# Patient Record
Sex: MALE | Race: WHITE | NOT HISPANIC OR LATINO | Employment: OTHER | URBAN - METROPOLITAN AREA
[De-identification: names, ages, dates, MRNs, and addresses within clinical notes are randomized per-mention and may not be internally consistent; named-entity substitution may affect disease eponyms.]

---

## 2018-06-19 ENCOUNTER — APPOINTMENT (OUTPATIENT)
Dept: CT IMAGING | Facility: CLINIC | Age: 48
End: 2018-06-19
Attending: EMERGENCY MEDICINE

## 2018-06-19 ENCOUNTER — APPOINTMENT (OUTPATIENT)
Dept: GENERAL RADIOLOGY | Facility: CLINIC | Age: 48
End: 2018-06-19
Attending: EMERGENCY MEDICINE

## 2018-06-19 ENCOUNTER — HOSPITAL ENCOUNTER (OUTPATIENT)
Facility: CLINIC | Age: 48
Setting detail: OBSERVATION
Discharge: HOME | End: 2018-06-20
Attending: EMERGENCY MEDICINE | Admitting: HOSPITALIST

## 2018-06-19 DIAGNOSIS — R07.9 ACUTE CHEST PAIN: ICD-10-CM

## 2018-06-19 DIAGNOSIS — J02.0 ACUTE STREPTOCOCCAL PHARYNGITIS: Primary | ICD-10-CM

## 2018-06-19 LAB
ANION GAP SERPL CALCULATED.3IONS-SCNC: 9 MMOL/L (ref 3–14)
BASOPHILS # BLD AUTO: 0.1 10E9/L (ref 0–0.2)
BASOPHILS NFR BLD AUTO: 0.7 %
BUN SERPL-MCNC: 12 MG/DL (ref 7–30)
CALCIUM SERPL-MCNC: 8.4 MG/DL (ref 8.5–10.1)
CHLORIDE SERPL-SCNC: 108 MMOL/L (ref 94–109)
CO2 SERPL-SCNC: 20 MMOL/L (ref 20–32)
CREAT SERPL-MCNC: 0.87 MG/DL (ref 0.66–1.25)
D DIMER PPP FEU-MCNC: <0.3 UG/ML FEU (ref 0–0.5)
DIFFERENTIAL METHOD BLD: ABNORMAL
EOSINOPHIL # BLD AUTO: 0.2 10E9/L (ref 0–0.7)
EOSINOPHIL NFR BLD AUTO: 1.5 %
ERYTHROCYTE [DISTWIDTH] IN BLOOD BY AUTOMATED COUNT: 12.8 % (ref 10–15)
GFR SERPL CREATININE-BSD FRML MDRD: >90 ML/MIN/1.7M2
GLUCOSE SERPL-MCNC: 84 MG/DL (ref 70–99)
HCT VFR BLD AUTO: 42.9 % (ref 40–53)
HGB BLD-MCNC: 14.7 G/DL (ref 13.3–17.7)
IMM GRANULOCYTES # BLD: 0.1 10E9/L (ref 0–0.4)
IMM GRANULOCYTES NFR BLD: 0.6 %
LYMPHOCYTES # BLD AUTO: 2.5 10E9/L (ref 0.8–5.3)
LYMPHOCYTES NFR BLD AUTO: 20.6 %
MCH RBC QN AUTO: 29.8 PG (ref 26.5–33)
MCHC RBC AUTO-ENTMCNC: 34.3 G/DL (ref 31.5–36.5)
MCV RBC AUTO: 87 FL (ref 78–100)
MONOCYTES # BLD AUTO: 0.8 10E9/L (ref 0–1.3)
MONOCYTES NFR BLD AUTO: 7 %
NEUTROPHILS # BLD AUTO: 8.3 10E9/L (ref 1.6–8.3)
NEUTROPHILS NFR BLD AUTO: 69.6 %
NRBC # BLD AUTO: 0 10*3/UL
NRBC BLD AUTO-RTO: 0 /100
PLATELET # BLD AUTO: 348 10E9/L (ref 150–450)
POTASSIUM SERPL-SCNC: 3.7 MMOL/L (ref 3.4–5.3)
RBC # BLD AUTO: 4.93 10E12/L (ref 4.4–5.9)
SODIUM SERPL-SCNC: 137 MMOL/L (ref 133–144)
TROPONIN I SERPL-MCNC: <0.015 UG/L (ref 0–0.04)
TROPONIN I SERPL-MCNC: <0.015 UG/L (ref 0–0.04)
WBC # BLD AUTO: 12 10E9/L (ref 4–11)

## 2018-06-19 PROCEDURE — 85025 COMPLETE CBC W/AUTO DIFF WBC: CPT | Performed by: EMERGENCY MEDICINE

## 2018-06-19 PROCEDURE — 85379 FIBRIN DEGRADATION QUANT: CPT | Performed by: EMERGENCY MEDICINE

## 2018-06-19 PROCEDURE — 99285 EMERGENCY DEPT VISIT HI MDM: CPT | Mod: 25

## 2018-06-19 PROCEDURE — 74177 CT ABD & PELVIS W/CONTRAST: CPT

## 2018-06-19 PROCEDURE — 96374 THER/PROPH/DIAG INJ IV PUSH: CPT | Mod: 59

## 2018-06-19 PROCEDURE — 71046 X-RAY EXAM CHEST 2 VIEWS: CPT

## 2018-06-19 PROCEDURE — 96361 HYDRATE IV INFUSION ADD-ON: CPT

## 2018-06-19 PROCEDURE — 96376 TX/PRO/DX INJ SAME DRUG ADON: CPT

## 2018-06-19 PROCEDURE — 99220 ZZC INITIAL OBSERVATION CARE,LEVL III: CPT | Performed by: PHYSICIAN ASSISTANT

## 2018-06-19 PROCEDURE — 93005 ELECTROCARDIOGRAM TRACING: CPT | Mod: 76

## 2018-06-19 PROCEDURE — 93005 ELECTROCARDIOGRAM TRACING: CPT

## 2018-06-19 PROCEDURE — 80048 BASIC METABOLIC PNL TOTAL CA: CPT | Performed by: EMERGENCY MEDICINE

## 2018-06-19 PROCEDURE — 84484 ASSAY OF TROPONIN QUANT: CPT | Performed by: EMERGENCY MEDICINE

## 2018-06-19 PROCEDURE — G0378 HOSPITAL OBSERVATION PER HR: HCPCS

## 2018-06-19 PROCEDURE — 25000128 H RX IP 250 OP 636: Performed by: EMERGENCY MEDICINE

## 2018-06-19 RX ORDER — OXYCODONE HYDROCHLORIDE 5 MG/1
5-10 TABLET ORAL
Status: DISCONTINUED | OUTPATIENT
Start: 2018-06-19 | End: 2018-06-20 | Stop reason: HOSPADM

## 2018-06-19 RX ORDER — NITROGLYCERIN 0.4 MG/1
TABLET SUBLINGUAL
Status: DISCONTINUED
Start: 2018-06-19 | End: 2018-06-19 | Stop reason: WASHOUT

## 2018-06-19 RX ORDER — LORAZEPAM 2 MG/ML
0.5 INJECTION INTRAMUSCULAR ONCE
Status: COMPLETED | OUTPATIENT
Start: 2018-06-19 | End: 2018-06-19

## 2018-06-19 RX ORDER — IOPAMIDOL 755 MG/ML
500 INJECTION, SOLUTION INTRAVASCULAR ONCE
Status: DISCONTINUED | OUTPATIENT
Start: 2018-06-19 | End: 2018-06-19

## 2018-06-19 RX ORDER — ASPIRIN 81 MG/1
81 TABLET ORAL DAILY
Status: DISCONTINUED | OUTPATIENT
Start: 2018-06-20 | End: 2018-06-20 | Stop reason: HOSPADM

## 2018-06-19 RX ORDER — LIDOCAINE 40 MG/G
CREAM TOPICAL
Status: DISCONTINUED | OUTPATIENT
Start: 2018-06-19 | End: 2018-06-20 | Stop reason: HOSPADM

## 2018-06-19 RX ORDER — ACETAMINOPHEN 325 MG/1
650 TABLET ORAL EVERY 4 HOURS PRN
Status: DISCONTINUED | OUTPATIENT
Start: 2018-06-19 | End: 2018-06-20 | Stop reason: HOSPADM

## 2018-06-19 RX ORDER — ALUMINA, MAGNESIA, AND SIMETHICONE 2400; 2400; 240 MG/30ML; MG/30ML; MG/30ML
30 SUSPENSION ORAL EVERY 4 HOURS PRN
Status: DISCONTINUED | OUTPATIENT
Start: 2018-06-19 | End: 2018-06-20 | Stop reason: HOSPADM

## 2018-06-19 RX ORDER — IOPAMIDOL 755 MG/ML
500 INJECTION, SOLUTION INTRAVASCULAR ONCE
Status: COMPLETED | OUTPATIENT
Start: 2018-06-19 | End: 2018-06-19

## 2018-06-19 RX ORDER — MORPHINE SULFATE 4 MG/ML
4 INJECTION, SOLUTION INTRAMUSCULAR; INTRAVENOUS
Status: DISCONTINUED | OUTPATIENT
Start: 2018-06-19 | End: 2018-06-20 | Stop reason: HOSPADM

## 2018-06-19 RX ORDER — NALOXONE HYDROCHLORIDE 0.4 MG/ML
.1-.4 INJECTION, SOLUTION INTRAMUSCULAR; INTRAVENOUS; SUBCUTANEOUS
Status: DISCONTINUED | OUTPATIENT
Start: 2018-06-19 | End: 2018-06-20 | Stop reason: HOSPADM

## 2018-06-19 RX ORDER — NITROGLYCERIN 0.4 MG/1
0.4 TABLET SUBLINGUAL EVERY 5 MIN PRN
Status: DISCONTINUED | OUTPATIENT
Start: 2018-06-19 | End: 2018-06-20 | Stop reason: HOSPADM

## 2018-06-19 RX ORDER — SODIUM CHLORIDE 9 MG/ML
1000 INJECTION, SOLUTION INTRAVENOUS CONTINUOUS
Status: DISCONTINUED | OUTPATIENT
Start: 2018-06-19 | End: 2018-06-19

## 2018-06-19 RX ORDER — LORAZEPAM 2 MG/ML
.5-1 INJECTION INTRAMUSCULAR EVERY 4 HOURS PRN
Status: DISCONTINUED | OUTPATIENT
Start: 2018-06-19 | End: 2018-06-20 | Stop reason: HOSPADM

## 2018-06-19 RX ORDER — LORAZEPAM 2 MG/ML
0.5 INJECTION INTRAMUSCULAR ONCE
Status: DISCONTINUED | OUTPATIENT
Start: 2018-06-19 | End: 2018-06-19

## 2018-06-19 RX ORDER — ACETAMINOPHEN 650 MG/1
650 SUPPOSITORY RECTAL EVERY 4 HOURS PRN
Status: DISCONTINUED | OUTPATIENT
Start: 2018-06-19 | End: 2018-06-20 | Stop reason: HOSPADM

## 2018-06-19 RX ADMIN — SODIUM CHLORIDE 1000 ML: 9 INJECTION, SOLUTION INTRAVENOUS at 15:13

## 2018-06-19 RX ADMIN — LORAZEPAM 0.5 MG: 2 INJECTION INTRAMUSCULAR; INTRAVENOUS at 17:52

## 2018-06-19 RX ADMIN — LORAZEPAM 0.5 MG: 2 INJECTION INTRAMUSCULAR; INTRAVENOUS at 17:36

## 2018-06-19 RX ADMIN — IOPAMIDOL 75 ML: 755 INJECTION, SOLUTION INTRAVENOUS at 19:17

## 2018-06-19 RX ADMIN — LORAZEPAM 0.5 MG: 2 INJECTION INTRAMUSCULAR; INTRAVENOUS at 18:35

## 2018-06-19 RX ADMIN — SODIUM CHLORIDE 60 ML: 9 INJECTION, SOLUTION INTRAVENOUS at 19:17

## 2018-06-19 ASSESSMENT — ENCOUNTER SYMPTOMS
UNEXPECTED WEIGHT CHANGE: 1
SHORTNESS OF BREATH: 1
FEVER: 0
MYALGIAS: 1
NECK PAIN: 1
CHEST TIGHTNESS: 1
LIGHT-HEADEDNESS: 1
DIAPHORESIS: 1

## 2018-06-19 NOTE — IP AVS SNAPSHOT
[facility] Observation Department    [address]    [facility] [address]    Phone:  [phone]                                       After Visit Summary   6/19/2018    Christiano Garcia    MRN: [id number]           After Visit Summary Signature Page     I have received my discharge instructions, and my questions have been answered. I have discussed any challenges I see with this plan with the nurse or doctor.    ..........................................................................................................................................  Patient/Patient Representative Signature      ..........................................................................................................................................  Patient Representative Print Name and Relationship to Patient    ..................................................               ................................................  Date                                            Time    ..........................................................................................................................................  Reviewed by Signature/Title    ...................................................              ..............................................  Date                                                            Time

## 2018-06-19 NOTE — ED PROVIDER NOTES
History     Chief Complaint:  Chest Pain    HPI   Christiano Garcia is a 48 year old male with a history of MI and HTN who presents to the emergency department for evaluation of chest pain. The patient reports that he has had chest pain since waking this morning. Accompanying this chest pain, the patient indicates he has light-headedness, neck pain, left arm pain/numbness, diaphoresis, shortness of breath, and ringing in his ears bilaterally. He also had one episode of syncope around 1300. The patient notes he has gained weight the last 3 weeks, along with leg swelling. He denies any recent travel or surgery, and he is not seen regularly.  He has not been taking metoprolol for many years now.  He reports the chest pain does also radiate into his back at times.  His father  at the aortic aneurysm about a year and half ago.  He does not drink alcohol anymore.     Allergies:  NKDA     Medications:    Albuterol  Multivitamin     Past Medical History:    Acute intermittent porphyria  CAD  Esophagitis  Kidney stones  PUD    Past Surgical History:    EGD Combined x2  Laparotomy    Family History:    No past pertinent family history.    Social History:  Presents alone.  Current some day smoker, 0.5 ppd.  Negative for alcohol use.  Marital Status:  Single [1]    Review of Systems   Constitutional: Positive for diaphoresis and unexpected weight change. Negative for fever.   HENT:        Ringing in the ears   Respiratory: Positive for chest tightness and shortness of breath.    Cardiovascular: Positive for chest pain and leg swelling.   Musculoskeletal: Positive for myalgias (left arm) and neck pain.   Neurological: Positive for light-headedness.   All other systems reviewed and are negative.    Physical Exam     Patient Vitals for the past 24 hrs:   BP Temp Pulse Heart Rate Resp SpO2 Weight   18 1900 (!) 130/94 - - 92 - 99 % -   18 1845 (!) 136/118 - - 92 - - -   18 1830 (!) 126/96 - - 82 - - -   18  1815 (!) 111/100 - - 116 - - -   06/19/18 1800 122/83 - - 86 - 94 % -   06/19/18 1715 (!) 134/102 - - 91 - - -   06/19/18 1700 (!) 137/103 - - 81 - - -   06/19/18 1645 (!) 132/109 - - 89 - - -   06/19/18 1615 (!) 116/107 - - 75 - - -   06/19/18 1600 118/80 - - 85 - - -   06/19/18 1545 (!) 124/98 - - 78 - - -   06/19/18 1530 127/86 - - 80 - - -   06/19/18 1515 131/87 - - 89 - - -   06/19/18 1500 (!) 132/93 - - 92 - - -   06/19/18 1445 (!) 134/92 - - 99 - 97 % -   06/19/18 1430 (!) 134/96 - - 99 - 96 % -   06/19/18 1415 (!) 138/95 - - 109 - 97 % -   06/19/18 1403 (!) 125/102 98  F (36.7  C) 125 - 20 100 % 99.8 kg (220 lb)     Physical Exam  General: At times agitated in room, yelling in pain   Head:  The scalp, face, and head appear normal  Eyes:  Conjunctivae are normal  ENT:    The nose is normal    Pinnae are normal    External acoustic canals are normal  Neck:  Trachea midline, good ROM   CV:  Pulses are normal, RRR, no murmurs.    Resp:  No respiratory distress, CTAB   Abdomen:      Soft, non-tender, non-distended  Musc:  Normal muscular tone    No major joint effusions    No asymmetric leg swelling  Skin:  No rash or lesions noted  Neuro:  Speech is normal and fluent. Face is symmetric.     Moving all extremities well.   Psych: Awake. Alert.  Normal affect.  Appropriate interactions.    Emergency Department Course   ECG:  Time: 1354  Vent. Rate 111 bpm. RI interval 152. QRS duration 82. QT/QTc 326/443. P-R-T axis 64 -23 83. Sinus tachycardia. Otherwise normal ECG. Read time: 1354    Time: 1716  Vent. Rate 91 bpm. RI interval 168. QRS duration 88. QT/QTc 364/447. P-R-T axis 64 -29 70. Normal sinus rhythm. Nonspecific T wave abnormality. Abnormal ECG. Read time: 1718    Imaging:  Radiographic findings were communicated with the patient who voiced understanding of the findings.    XR Chest 2 views:   No acute disease. As per radiology.     CT Aortic Survery w Contrast:  1. No acute abnormality in the chest,  abdomen, or pelvis. No evidence  for aneurysm or dissection.   2. A few small indeterminate pulmonary nodules in both lungs, with the  largest measuring 0.6 cm. Please refer to pulmonary nodule follow-up  guidelines below. As per radiology.    Laboratory:  CBC: WBC: 12.0 (H), HGB: 14.7, PLT: 348  BMP: Calcium 8.4 (L), o/w WNL (Creatinine: 0.87)    D-dimer: <0.3    1427 Troponin I: <0.015  1706 Troponin I: <0.015    Interventions:  1513 NS 1L IV BOLUS  1736 Ativan 0.5 mg IV  1752 Ativan 0.5 mg IV  1835 Ativan 0.5 mg IV  1917 NS 1L IV    Emergency Department Course:  Nursing notes and vitals reviewed. 1449 I performed an exam of the patient as documented above.     EKG x2 obtained in the ED, see results above.     IV inserted. Medicine administered as documented above. Blood drawn. This was sent to the lab for further testing, results above.    The patient was sent for a XR Chest, CT Aortic Survey while in the emergency department, findings above.     2020 I rechecked the patient and discussed the results of his workup thus far.     2047 I spoke with DULCE Corcoran for Dr. Mcintyre, hospitalist, who agreed to admit this patient.    Findings and plan explained to the Patient who consents to admission. Discussed the patient with DULCE Corcoran, who will admit the patient to an observation bed for further monitoring, evaluation, and treatment.    I personally reviewed the laboratory results with the Patient and answered all related questions prior to admittance.    Impression & Plan      Medical Decision Making:  Christiano Garcia is a 48 year old male with a history of possible porphyria, MI, who presents with chest pain and syncope. Vitals here revealed intermittent tachycardia, but otherwise unremarkable. He was very anxious on arrival. He was screaming in pain, reporting numbness in his left arm. It was intermittent in nature. Differential for him is quite wide, EKG was nonischemic. I am certainly concerned about aortic  pathologies. His father had an AAA 1.5 years ago and was the cause of his death. He does say his chest pain radiates to the back, so we opted to do a CT Aortic Survey. However, when we brought him to CT, he had an extreme panic attack and could not tolerate it. He was very difficult to talk to during this panic attack. On return, we gave him a total of 1.5 mg of Ativan, which seemed to help both his chest pain and his anxiety. He was able to tolerate a CT. CT Aortic Survey was negative. I did do two troponins which were negative. His CBC and BMP were otherwise unremarkable. Patient looks much more comfortable than when he arrived, especially regarding chest pain. However, he states he still has chest pain. Given his degree of pain and his presentation, I think it would be reasonable to bring him to obs. He did have an MI 10 years in [place]. He did have a stress test at that time, so he does have risk factors. Patient will be admitted to obs unit for continued workup of his chest pain and treatment.    Diagnosis:    ICD-10-CM   1. Acute chest pain R07.9       Disposition:  Admitted to DULCE Corcoran for Dr. Mcintyre.    I, Diomedes Padron, am serving as a scribe on 6/19/2018 at 2:49 PM to personally document services performed by Yumiko Grayson MD based on my observations and the provider's statements to me.     Diomedes Padron  6/19/2018   [facility] EMERGENCY DEPARTMENT       Yumiko Grayson MD  06/19/18 2147

## 2018-06-19 NOTE — ED NOTES
Patient complaining of severe chest pain.  MD notified and came to bedside.  Patient also reports that he has coughed up blood.  Patient is holding kleenex that is lightly pink in color.

## 2018-06-19 NOTE — ED TRIAGE NOTES
Chest pain and left arm numbness x 2 hours. Patient states history of MI 10 years ago. Woke up with chest pain this morning and sweating. ABC intact alert and no distress, ears ringing. + syncope at home

## 2018-06-19 NOTE — IP AVS SNAPSHOT
MRN:[id number]                      After Visit Summary   6/19/2018    Christiano Garcia    MRN: [id number]           Thank you!     Thank you for choosing [facility] for your care. Our goal is always to provide you with excellent care. Hearing back from our patients is one way we can continue to improve our services. Please take a few minutes to complete the written survey that you may receive in the mail after you visit. If you would like to speak to someone directly about your visit please contact Patient Relations at [phone]. Thank you!          Patient Information     Date Of Birth          1970        Designated Caregiver       Most Recent Value    Caregiver    Will someone help with your care after discharge? yes    Name of designated caregiver Afua Christensen    Phone number of caregiver       About your hospital stay     You were admitted on:  June 19, 2018 You last received care in the:  [facility] Observation Department    You were discharged on:  June 20, 2018        Reason for your hospital stay       Lymphadenopathy likely related to Strep pharyngitis                  Who to Call     For medical emergencies, please call 911.  For non-urgent questions about your medical care, please call your primary care provider or clinic, None          Attending Provider     Provider Specialty    Yumiko Grayson MD Emergency Medicine    Bo Mcintyre MD Internal Medicine       Primary Care Provider Fax #    Physician No Ref-Primary [phone]      After Care Instructions     Activity       Your activity upon discharge: activity as tolerated            Diet       Follow this diet upon discharge: Orders Placed This Encounter      Regular Diet Adult                  Follow-up Appointments     Follow-up and recommended labs and tests        Follow up with [facility], [place], ([address], [place] [address]) on Tuesday June 26 at 9:30 (9:15  "check-in) with Dr. Dooley for post-hospital visit. Follow-up on , with Dr. Юлия Grubbs to establish care (also [facility], [place])                             Pending Results     No orders found for last 3 day(s).            Statement of Approval     Ordered          18 1256  I have reviewed and agree with all the recommendations and orders detailed in this document.  EFFECTIVE NOW     Approved and electronically signed by:  Bo Mcintyre MD             Admission Information     Date & Time Provider Department Dept. Phone    2018 Bo Mcintyre MD [facility] Observation Department [phone]      Your Vitals Were     Blood Pressure Pulse Temperature Respirations Weight Pulse Oximetry    115/87 (BP Location: Left arm) 79 95.8  F (35.4  C) (Oral) 18 99.8 kg (220 lb) 98%    BMI (Body Mass Index)                   27.5 kg/m2           MyChart Information     [facility] lets you send messages to your doctor, view your test results, renew your prescriptions, schedule appointments and more. To sign up, go to [email or web address] . Click on \"Log in\" on the left side of the screen, which will take you to the Welcome page. Then click on \"Sign up Now\" on the right side of the page.     You will be asked to enter the access code listed below, as well as some personal information. Please follow the directions to create your username and password.     Your access code is: [email or web address]  Expires: 2018 12:59 PM     Your access code will  in 90 days. If you need help or a new code, please call your [facility] clinic or [phone].        Care EveryWhere ID     This is your Care EveryWhere ID. This could be used by other organizations to access your [facility] medical records  [id number]        Equal Access to Services     ROSEANNE GUZMÁN : Ashok Mason, waaxda luqadaha, qaybta kaalmada elisabet, alana ford. So wa " [phone].    ATENCIÓN: Si diamante galarza, tiene a gloria disposición servicios gratuitos de asistencia lingüística. Sindhu vieira [phone].    We comply with applicable federal civil rights laws and [place] laws. We do not discriminate on the basis of race, color, national origin, age, disability, sex, sexual orientation, or gender identity.               Review of your medicines      START taking        Dose / Directions    amoxicillin 500 MG capsule   Commonly known as:  AMOXIL   Indication:  strep pharyngitis   Used for:  Acute streptococcal pharyngitis        Dose:  500 mg   Take 1 capsule (500 mg) by mouth every 12 hours for 19 doses   Quantity:  19 capsule   Refills:  0         CONTINUE these medicines which have NOT CHANGED        Dose / Directions    albuterol 108 (90 Base) MCG/ACT Inhaler   Commonly known as:  PROAIR HFA        Dose:  2 puff   Inhale 2 puffs into the lungs every 4 hours as needed for shortness of breath / dyspnea   Quantity:  1 Inhaler   Refills:  0       Multi-vitamin Tabs tablet        Dose:  1 tablet   Take 1 tablet by mouth daily   Refills:  0            Where to get your medicines      These medications were sent to [facility] [address] [address] [address]     Phone:  [phone]     amoxicillin 500 MG capsule                Protect others around you: Learn how to safely use, store and throw away your medicines at www.disposemymeds.org.        ANTIBIOTIC INSTRUCTION     You've Been Prescribed an Antibiotic - Now What?  Your healthcare team thinks that you or your loved one might have an infection. Some infections can be treated with antibiotics, which are powerful, life-saving drugs. Like all medications, antibiotics have side effects and should only be used when necessary. There are some important things you should know about your antibiotic treatment.      Your healthcare team may run tests before you start  taking an antibiotic.    Your team may take samples (e.g., from your blood, urine or other areas) to run tests to look for bacteria. These test can be important to determine if you need an antibiotic at all and, if you do, which antibiotic will work best.      Within a few days, your healthcare team might change or even stop your antibiotic.    Your team may start you on an antibiotic while they are working to find out what is making you sick.    Your team might change your antibiotic because test results show that a different antibiotic would be better to treat your infection.    In some cases, once your team has more information, they learn that you do not need an antibiotic at all. They may find out that you don't have an infection, or that the antibiotic you're taking won't work against your infection. For example, an infection caused by a virus can't be treated with antibiotics. Staying on an antibiotic when you don't need it is more likely to be harmful than helpful.      You may experience side effects from your antibiotic.    Like all medications, antibiotics have side effects. Some of these can be serious.    Let you healthcare team know if you have any known allergies when you are admitted to the hospital.    One significant side effect of nearly all antibiotics is the risk of severe and sometimes deadly diarrhea caused by Clostridium difficile (C. Difficile). This occurs when a person takes antibiotics because some good germs are destroyed. Antibiotic use allows C. diificile to take over, putting patients at high risk for this serious infection.    As a patient or caregiver, it is important to understand your or your loved one's antibiotic treatment. It is especially important for caregivers to speak up when patients can't speak for themselves. Here are some important questions to ask your healthcare team.    What infection is this antibiotic treating and how do you know I have that infection?    What  side effects might occur from this antibiotic?    How long will I need to take this antibiotic?    Is it safe to take this antibiotic with other medications or supplements (e.g., vitamins) that I am taking?     Are there any special directions I need to know about taking this antibiotic? For example, should I take it with food?    How will I be monitored to know whether my infection is responding to the antibiotic?    What tests may help to make sure the right antibiotic is prescribed for me?      Information provided by:  www.cdc.gov/getsmart  U.S. Department of Health and Human Services  Centers for disease Control and Prevention  National Center for Emerging and Zoonotic Infectious Diseases  Division of Healthcare Quality Promotion             Medication List: This is a list of all your medications and when to take them. Check marks below indicate your daily home schedule. Keep this list as a reference.      Medications           Morning Afternoon Evening Bedtime As Needed    albuterol 108 (90 Base) MCG/ACT Inhaler   Commonly known as:  PROAIR HFA   Inhale 2 puffs into the lungs every 4 hours as needed for shortness of breath / dyspnea                                amoxicillin 500 MG capsule   Commonly known as:  AMOXIL   Take 1 capsule (500 mg) by mouth every 12 hours for 19 doses   Last time this was given:  500 mg on 6/20/2018 12:16 PM                                Multi-vitamin Tabs tablet   Take 1 tablet by mouth daily

## 2018-06-20 ENCOUNTER — APPOINTMENT (OUTPATIENT)
Dept: CARDIOLOGY | Facility: CLINIC | Age: 48
End: 2018-06-20
Attending: PHYSICIAN ASSISTANT

## 2018-06-20 ENCOUNTER — APPOINTMENT (OUTPATIENT)
Dept: ULTRASOUND IMAGING | Facility: CLINIC | Age: 48
End: 2018-06-20
Attending: PHYSICIAN ASSISTANT

## 2018-06-20 VITALS
WEIGHT: 220 LBS | RESPIRATION RATE: 18 BRPM | DIASTOLIC BLOOD PRESSURE: 91 MMHG | SYSTOLIC BLOOD PRESSURE: 127 MMHG | HEART RATE: 79 BPM | BODY MASS INDEX: 27.5 KG/M2 | TEMPERATURE: 95.9 F | OXYGEN SATURATION: 98 %

## 2018-06-20 LAB
DEPRECATED S PYO AG THROAT QL EIA: ABNORMAL
INTERPRETATION ECG - MUSE: NORMAL
INTERPRETATION ECG - MUSE: NORMAL
SPECIMEN SOURCE: ABNORMAL
TROPONIN I SERPL-MCNC: <0.015 UG/L (ref 0–0.04)

## 2018-06-20 PROCEDURE — 93018 CV STRESS TEST I&R ONLY: CPT | Performed by: INTERNAL MEDICINE

## 2018-06-20 PROCEDURE — 25000128 H RX IP 250 OP 636: Performed by: HOSPITALIST

## 2018-06-20 PROCEDURE — 93306 TTE W/DOPPLER COMPLETE: CPT | Mod: 26 | Performed by: INTERNAL MEDICINE

## 2018-06-20 PROCEDURE — 93306 TTE W/DOPPLER COMPLETE: CPT

## 2018-06-20 PROCEDURE — 36415 COLL VENOUS BLD VENIPUNCTURE: CPT | Performed by: PHYSICIAN ASSISTANT

## 2018-06-20 PROCEDURE — 93325 DOPPLER ECHO COLOR FLOW MAPG: CPT | Mod: 26 | Performed by: INTERNAL MEDICINE

## 2018-06-20 PROCEDURE — 99217 ZZC OBSERVATION CARE DISCHARGE: CPT | Performed by: HOSPITALIST

## 2018-06-20 PROCEDURE — 93350 STRESS TTE ONLY: CPT | Mod: 26 | Performed by: INTERNAL MEDICINE

## 2018-06-20 PROCEDURE — 93321 DOPPLER ECHO F-UP/LMTD STD: CPT | Mod: 26 | Performed by: INTERNAL MEDICINE

## 2018-06-20 PROCEDURE — 93016 CV STRESS TEST SUPVJ ONLY: CPT | Performed by: INTERNAL MEDICINE

## 2018-06-20 PROCEDURE — 87880 STREP A ASSAY W/OPTIC: CPT | Performed by: HOSPITALIST

## 2018-06-20 PROCEDURE — 25000132 ZZH RX MED GY IP 250 OP 250 PS 637: Performed by: HOSPITALIST

## 2018-06-20 PROCEDURE — 96376 TX/PRO/DX INJ SAME DRUG ADON: CPT

## 2018-06-20 PROCEDURE — 93880 EXTRACRANIAL BILAT STUDY: CPT

## 2018-06-20 PROCEDURE — 93321 DOPPLER ECHO F-UP/LMTD STD: CPT | Mod: TC

## 2018-06-20 PROCEDURE — G0378 HOSPITAL OBSERVATION PER HR: HCPCS

## 2018-06-20 PROCEDURE — 84484 ASSAY OF TROPONIN QUANT: CPT | Performed by: PHYSICIAN ASSISTANT

## 2018-06-20 RX ORDER — IOPAMIDOL 755 MG/ML
500 INJECTION, SOLUTION INTRAVASCULAR ONCE
Status: COMPLETED | OUTPATIENT
Start: 2018-06-20 | End: 2018-06-20

## 2018-06-20 RX ORDER — AMOXICILLIN 500 MG/1
500 CAPSULE ORAL EVERY 12 HOURS
Qty: 19 CAPSULE | Refills: 0 | Status: SHIPPED | OUTPATIENT
Start: 2018-06-20 | End: 2018-06-30

## 2018-06-20 RX ORDER — AMOXICILLIN 500 MG/1
500 CAPSULE ORAL EVERY 12 HOURS SCHEDULED
Status: DISCONTINUED | OUTPATIENT
Start: 2018-06-20 | End: 2018-06-20 | Stop reason: HOSPADM

## 2018-06-20 RX ORDER — LORAZEPAM 2 MG/ML
2 INJECTION INTRAMUSCULAR ONCE
Status: COMPLETED | OUTPATIENT
Start: 2018-06-20 | End: 2018-06-20

## 2018-06-20 RX ADMIN — AMOXICILLIN 500 MG: 500 CAPSULE ORAL at 12:16

## 2018-06-20 RX ADMIN — LORAZEPAM 2 MG: 2 INJECTION INTRAMUSCULAR; INTRAVENOUS at 10:53

## 2018-06-20 RX ADMIN — IOPAMIDOL 80 ML: 755 INJECTION, SOLUTION INTRAVENOUS at 11:51

## 2018-06-20 RX ADMIN — LORAZEPAM 2 MG: 2 INJECTION INTRAMUSCULAR; INTRAVENOUS at 11:33

## 2018-06-20 NOTE — ED NOTES
RN to CT at request of CT staff d/t pt anxious about CT. Pt cooperative and on table, CT staff able to obtain imaging.

## 2018-06-20 NOTE — PLAN OF CARE
Problem: Patient Care Overview  Goal: Plan of Care/Patient Progress Review  PRIMARY DIAGNOSIS: CHEST PAIN  OUTPATIENT/OBSERVATION GOALS TO BE MET BEFORE DISCHARGE:  1. Ruled out acute coronary syndrome (negative or stable Troponin):  Yes, trops neg x3  2. Pain Status: Rating pain 2/10  3. Appropriate provocative testing performed: Yes - echo, exercise stress test  - Stress Test Procedure: Exercise stress test completed  - Interpretation of cardiac rhythm per telemetry tech: SR-[id number]     4. Cleared by Consultants (if applicable): No  5. Return to near baseline physical activity: Yes - up ind  Discharge Planner Nurse   Safe discharge environment identified: Yes  Barriers to discharge: No, not once medically clear       Entered by: Dee Alas 06/20/2018 8:21 AM      Nurse to notify provider when observation goals have been met and patient is ready for discharge.

## 2018-06-20 NOTE — PLAN OF CARE
Problem: Patient Care Overview  Goal: Plan of Care/Patient Progress Review  PRIMARY DIAGNOSIS: CHEST PAIN  OUTPATIENT/OBSERVATION GOALS TO BE MET BEFORE DISCHARGE:  1. Ruled out acute coronary syndrome (negative or stable Troponin):  Yes, trops neg x3  2. Pain Status: Rating pain 2/10   3. Appropriate provocative testing performed: Yes - echo, exercise stress test, US carotid  - Stress Test Procedure: Exercise stress test completed  - Interpretation of cardiac rhythm per telemetry tech: SR      4. Cleared by Consultants (if applicable): No  5. Return to near baseline physical activity: Yes - up ind  Discharge Planner Nurse   Safe discharge environment identified: Yes  Barriers to discharge: No, not once medically clear       Entered by: Dee Alas 06/20/2018 11:38 AM     Pt continues to have ringing in ears. Complaints 2/10 pain in left arm.     Rapid strep test completed at +strep.  CT soft tissue neck ordered.      Nurse to notify provider when observation goals have been met and patient is ready for discharge.

## 2018-06-20 NOTE — PLAN OF CARE
Problem: Patient Care Overview  Goal: Plan of Care/Patient Progress Review  PRIMARY DIAGNOSIS: CHEST PAIN  OUTPATIENT/OBSERVATION GOALS TO BE MET BEFORE DISCHARGE:  1. Ruled out acute coronary syndrome (negative or stable Troponin):  Yes, trops neg x3  2. Pain Status: rating chest pain a 7   3. Appropriate provocative testing performed: Yes  - Stress Test Procedure: exercise stress ECHO today  - Interpretation of cardiac rhythm per telemetry tech: SR-[id number]    4. Cleared by Consultants (if applicable):No  5. Return to near baseline physical activity: Yes  Discharge Planner Nurse   Safe discharge environment identified: Yes  Barriers to discharge: Yes       Entered by: Avril Dow 06/20/2018 4:12 AM     VSS, A&Ox4, up independently, still rating chest pain a 7- refusing meds at this time, will have exercise stress ECHO today, will have carotid US this AM also, clear liquid diet, trops neg x3, SL     Please review provider order for any additional goals.   Nurse to notify provider when observation goals have been met and patient is ready for discharge.

## 2018-06-20 NOTE — PHARMACY-ADMISSION MEDICATION HISTORY
Admission medication history interview status for this patient is complete. See [facility] admission navigator for allergy information, prior to admission medications and immunization status.     Medication history interview source(s):Patient  Medication history resources (including written lists, pill bottles, clinic record):None    Changes made to PTA medication list:  Added: none  Deleted: none  Changed: none    Actions taken by pharmacist (provider contacted, etc):None     Additional medication history information:None    Medication reconciliation/reorder completed by provider prior to medication history? No    For patients on insulin therapy: no (Yes/No)   Lantus/levemir/NPH/Mix 70/30 dose: ___ in AM/PM or twice daily   Sliding scale Novolog Y/N   If Yes, do you have a baseline novolog pre-meal dose: ______units with meals   Patients eat three meals a day: Y/N ---  How many episodes of hypoglycemia (low blood glucose) do you have weekly: ---   How many missed doses do you have a week: ---  How many times do you check your blood glucose per day: ---  Any Barriers to therapy: cost of medications/comfortable with giving injections (if applicable)/ comfortable and confident with current diabetes regimen ---      Prior to Admission medications    Medication Sig Last Dose Taking? Auth Provider   albuterol (ALBUTEROL) 108 (90 BASE) MCG/ACT inhaler Inhale 2 puffs into the lungs every 4 hours as needed for shortness of breath / dyspnea  Yes Phani Persaud MD   multivitamin, therapeutic with minerals (MULTI-VITAMIN) TABS Take 1 tablet by mouth daily Past Month at Unknown time Yes Unknown, Entered By History

## 2018-06-20 NOTE — PLAN OF CARE
Problem: Patient Care Overview  Goal: Plan of Care/Patient Progress Review  Outcome: No Change  ROOM # 206 2    Living Situation  Lives independently in the community with his significant other  Facility name: NA  : Afua Christensen,  [phone]    Activity level at baseline: independent  Activity level on admit: independent      Patient registered to observation; given Patient Bill of Rights; given the opportunity to ask questions about observation status and their plan of care.  Patient has been oriented to the observation room, bathroom and call light is in place.    Discussed discharge goals and expectations with patient/family.

## 2018-06-20 NOTE — ED NOTES
[facility]  ED Nurse Handoff Report    Christiano Garcia is a 48 year old male   ED Chief complaint: Chest Pain  . ED Diagnosis:   Final diagnoses:   Acute chest pain     Allergies: No Known Allergies    Code Status: Full Code  Activity level - Baseline/Home:  Independent. Activity Level - Current:   Stand with Assist. Lift room needed: No. Bariatric: No   Needed: No   Isolation: No. Infection: Not Applicable.     Vital Signs:   Vitals:    06/19/18 1815 06/19/18 1830 06/19/18 1845 06/19/18 1900   BP: (!) 111/100 (!) 126/96 (!) 136/118 (!) 130/94   Pulse:       Resp:       Temp:       SpO2:    99%   Weight:           Cardiac Rhythm:  ,      Pain level: 0-10 Pain Scale: 5  Patient confused: No. Patient Falls Risk: Yes.   Elimination Status: Has voided   Patient Report - Initial Complaint: Chest pain dizziness. Focused Assessment:Pt A&Ox4 C/O L sided chest pain that radiates to the L arm. Pt reports night sweats last night and dizziness with ambulation. Pt NSR on monitor   Tests Performed: labs, CT . Abnormal Results:   Labs Ordered and Resulted from Time of ED Arrival Up to the Time of Departure from the ED   CBC WITH PLATELETS DIFFERENTIAL - Abnormal; Notable for the following:        Result Value    WBC 12.0 (*)     All other components within normal limits   BASIC METABOLIC PANEL - Abnormal; Notable for the following:     Calcium 8.4 (*)     All other components within normal limits   TROPONIN I   D DIMER QUANTITATIVE   TROPONIN I      Treatments provided:IVF ativan  Family Comments: n/a  OBS brochure/video discussed/provided to patient:  Yes  ED Medications:   Medications   0.9% sodium chloride BOLUS (0 mLs Intravenous Stopped 6/19/18 1904)     Followed by   sodium chloride 0.9% infusion (not administered)   iopamidol (ISOVUE-370) solution 500 mL (75 mLs Intravenous Not Given 6/19/18 1721)   0.9% sodium chloride BOLUS (0 mLs Intravenous Stopped 6/19/18 1731)   LORazepam (ATIVAN) injection 0.5 mg  (not administered)   LORazepam (ATIVAN) injection 0.5 mg (0.5 mg Intravenous Given 6/19/18 1736)   LORazepam (ATIVAN) injection 0.5 mg (0.5 mg Intravenous Given 6/19/18 1752)   LORazepam (ATIVAN) injection 0.5 mg (0.5 mg Intravenous Given 6/19/18 1835)   0.9% sodium chloride BOLUS (0 mLs Intravenous Stopped 6/19/18 1929)   iopamidol (ISOVUE-370) solution 500 mL (75 mLs Intravenous Given 6/19/18 1917)     Drips infusing:  No  For the majority of the shift, the patient's behavior Yellow. Interventions performed were Pt anxious, ativan provided .     Severe Sepsis OR Septic Shock Diagnosis Present: No      ED Nurse Name/Phone Number: Cjmomo SHAHLA Aldridge,   9:17 PRECEIVING UNIT ED HANDOFF REVIEW    Above ED Nurse Handoff Report was reviewed: Yes  Reviewed by: Karen M. Lesch on June 19, 2018 at 9:28 PM

## 2018-06-20 NOTE — PLAN OF CARE
Problem: Patient Care Overview  Goal: Plan of Care/Patient Progress Review  Patient's After Visit Summary was reviewed with patient.   Patient verbalized understanding of After Visit Summary, recommended follow up and was given an opportunity to ask questions.   Discharge medications sent home with patient/family: E-script sent, pt to  at pharmacy.  Discharged with other: Pt stated someone from [employer] would pick him up.     Pt advised by RN not to drive after receiving ativan for at least 6 hrs. 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 Pt left room to go  prescription and stated he will wait downstairs for his ride.

## 2018-06-20 NOTE — DISCHARGE SUMMARY
[facility]  Discharge Summary  Name: Christiano Garcia    MRN: [id number]  YOB: 1970    Age: 48 year old  Date of Discharge:  6/20/2018  Date of Admission: 6/19/2018  Primary Care Provider: No Ref-Primary, Physician  Discharge Physician:  Bo Mcintyre MD  Discharging Service:  Hospitalist  Date of Service (when I saw the patient): 06/20/18    Discharge Diagnosis:  Lymphadenopathy due to strep pharyngitis     Other Diagnosis:  CAD, PUD and possible hx of acute intermittent porphyria     Discharge Disposition:  Discharged to home     Allergies:  No Known Allergies     Discharge Medications:   Current Discharge Medication List      START taking these medications    Details   amoxicillin (AMOXIL) 500 MG capsule Take 1 capsule (500 mg) by mouth every 12 hours for 19 doses  Qty: 19 capsule, Refills: 0    Associated Diagnoses: Acute streptococcal pharyngitis         CONTINUE these medications which have NOT CHANGED    Details   albuterol (ALBUTEROL) 108 (90 BASE) MCG/ACT inhaler Inhale 2 puffs into the lungs every 4 hours as needed for shortness of breath / dyspnea  Qty: 1 Inhaler, Refills: 0      multivitamin, therapeutic with minerals (MULTI-VITAMIN) TABS Take 1 tablet by mouth daily              Condition on Discharge:  Discharge condition: Stable   Discharge vitals: Blood pressure 115/87, pulse 79, temperature 95.8  F (35.4  C), temperature source Oral, resp. rate 18, weight 99.8 kg (220 lb), SpO2 98 %.   Code status on discharge: Full Code     History of Illness:  See detailed admission note for full details.   48 year old male with a PMH significant for probable CAD, PUD and possible hx of acute intermittent porphyria, who presents with chest pain.  The patient reports not feeling well last night, he noted left-sided chest pain with radiation to his left arm.  Took some aspirin and went to bed.  He said he woke up in the middle of the night drenched in sweat.  Today his chest pain has persisted,  "it is associated with dyspnea on exertion and lightheadedness.  He notes his pain worsened with walking.  He also notes ringing in his ears that is quite severe.  He is also noted swelling in his bilateral ankles and weight gain over the last few weeks.  He notes he is felt more fatigued with exertion recently.  He also states that around 1300 he is still up had a syncopal episode.  He denies any recent travel or surgery.  He reports a history of a heart attack many years ago but stent was placed.  Had an abnormal stress test a few years ago and was recommended to see cardiologist for further workup but never did do that.  He also notes worsening vision over the past 3 months.  He previously did not need glasses and now it wears readers.  He also states that he gets what he calls \"static\" in his vision intermittently.  He was previously on metoprolol but is no longer taking this.  He does smoke but denies alcohol and drug use.  He also notes nausea lymph node below his right ear.  He denies fever, chills, abdominal pain, vomiting, diarrhea or dysuria.      Significant Physical Exam Findings:  Patient is doing fine. He still has ringing in his ears and some left arm pain.  s1s2 rrr, lungs clear. Bilat TM normal. Oropharynx does not show exudate. He does have some submandibular lymphadenopathy. No axillary or inguinal lymphadenopathy    Procedures:  Echo, stress ECHO     Imaging:  Results for orders placed or performed during the hospital encounter of 06/19/18   XR Chest 2 Views    Narrative    CHEST TWO VIEWS 6/19/2018 4:26 PM     HISTORY: Chest pain.    COMPARISON: September 2, 2014     FINDINGS: There are no acute infiltrates. The cardiac silhouette is  not enlarged. Pulmonary vasculature is unremarkable.      Impression    IMPRESSION: No acute disease.    VERNELL MORENO MD   CT Aortic Survey w Contrast    Narrative    CT AORTIC SURVEY WITH CONTRAST   6/19/2018 7:27 PM     HISTORY: Chest pain. Evaluate for " dissection.    TECHNIQUE: 75 mL Isovue-370 IV were administered. After contrast  administration, volumetric helical sections were acquired from the  thoracic inlet to the ischial tuberosities. Coronal images were also  reconstructed. Radiation dose for this scan was reduced using  automated exposure control, adjustment of the mA and/or kV according  to patient size, or iterative reconstruction technique.    COMPARISON: None.    FINDINGS:    Chest: No evidence for thoracic aortic aneurysm or dissection. No  pleural or pericardial effusions. Calcified right hilar lymph nodes  are noted. No enlarged lymph nodes are identified in the chest. There  is a cluster of small calcified granulomas in the right middle lobe  laterally. There are a few small indeterminate pulmonary nodules  bilaterally, with the largest along the right minor fissure measuring  0.6 cm (series 10 image 177).    Abdomen and Pelvis: No evidence for aortoiliac aneurysm or dissection.  Scattered small calcified granulomas in the spleen. The liver,  gallbladder, spleen, adrenal glands, pancreas, and kidneys are  otherwise unremarkable. No bowel obstruction. Mild central prostatic  calcification. No free fluid in the pelvis. No free intraperitoneal  air. The appendix is not visualized, however there is no significant  inflammatory change identified in the right lower quadrant.      Impression    IMPRESSION:   1. No acute abnormality in the chest, abdomen, or pelvis. No evidence  for aneurysm or dissection.   2. A few small indeterminate pulmonary nodules are noted in both  lungs, with the largest measuring 0.6 cm. Please refer to pulmonary  nodule follow-up guidelines below.    Recommendations for an incidental lung nodule = or > 6mm to 8mm:    Low risk patients: Initial follow-up CT at 6-12 months, then  consider CT at 18-24 months if no change.    High risk patients: Initial follow-up CT at 6-12 months, then CT at  18-24 months if no change.    *Low  Risk: Minimal or absent history of smoking or other known risk  factors.  *Nonsolid (ground-glass) or partly solid nodules may require longer  follow-up to exclude indolent adenocarcinoma.  *Recommendations based on Guidelines for the Management of Incidental  Pulmonary Nodules Detected at CT: From the Fleischner Society 2017,  Radiology 2017.        ARTEM HICKS MD   US Carotid Bilateral    Narrative    BILATERAL CAROTID ULTRASOUND   6/20/2018 5:48 AM     HISTORY:  vision changes, ringing ears;     COMPARISON: None.    RIGHT CAROTID FINDINGS:  There is no calcified and noncalcified  atherosclerotic plaque in the carotid bifurcation.   Right ICA PSV:  95  cm/sec.  Right ICA EDV:  46 cm/sec.  Right ICA/CCA PSV Ratio:  0.8    These indicate less than 50% diameter stenosis of the right ICA.    Right Vertebral: Antegrade flow.   Right ECA: Antegrade flow.     LEFT CAROTID FINDINGS:  There is no calcified and noncalcified  atherosclerotic plaque in the carotid bifurcation.   Left ICA PSV:  85  cm/sec.  Left ICA EDV:  42 cm/sec.  Left ICA/CCA PSV Ratio:  0.6    These indicate less than 50% diameter stenosis of the left ICA.    Left Vertebral: Antegrade flow.   Left ECA: Antegrade flow.     Causes of Decreased Accuracy:   None.       Impression    IMPRESSION:    1. Less than 50% diameter stenosis of the right ICA relative to the  distal ICA diameter.  2. Less than 50% diameter stenosis of the left ICA relative to the  distal ICA diameter.    BENITA VENTURA MD        Consultations:  No consultations were requested during this admission.     Significant Lab Results:    Recent Labs  Lab 06/19/18  1427   WBC 12.0*   HGB 14.7   HCT 42.9   MCV 87             Lab Results   Component Value Date     06/19/2018     09/01/2014     12/20/2013    Lab Results   Component Value Date    CHLORIDE 108 06/19/2018    CHLORIDE 109 09/01/2014    CHLORIDE 108 12/20/2013    Lab Results   Component Value Date    BUN 12  06/19/2018    BUN 13 09/01/2014    BUN 12 12/20/2013      Lab Results   Component Value Date    POTASSIUM 3.7 06/19/2018    POTASSIUM 3.5 09/01/2014    POTASSIUM 4.0 12/20/2013    Lab Results   Component Value Date    CO2 20 06/19/2018    CO2 24 09/01/2014    CO2 27 12/20/2013    Lab Results   Component Value Date    CR 0.87 06/19/2018    CR 0.84 09/01/2014    CR 0.90 12/20/2013           Pending Results:    Unresulted Labs Ordered in the Past 30 Days of this Admission     No orders found for last 61 day(s).           Discharge Instructions and Follow-Up:   Discharge diet:   Active Diet Order      Regular Diet Adult      Diet   Discharge activity: Activity as tolerated   Discharge follow-up: Follow up with primary care provider on Tuesday June 26   Outpatient therapy: None    Home Care agency: None    Other instructions: Establish care with PCP on July 3rd at 9am (Dr. Grubbs)      Hospital Course:  Patient was admitted to the Obs Unit. He had extensive testing done including ECHO, stress ECHO, carotid US, CT chest. When I assessed him, his primary complaints were ringing in his ears and lymphadenopathy. He did have sweating and possible fever at home, but no URI symptoms. I did do a rapid strep and this was positive. He will receive 10 days of amoxicllin. I have called PN and scheduled him with a follow-up appt next week as well as an appt to establish care.      Total time spent in face to face contact with the patient and coordinating discharge was:  40 Minutes.    Bo Mcintyre MD  Pager: [phone]

## 2018-06-20 NOTE — H&P
"[facility] Outpatient / Observation Unit  History and Physical Exam     Christiano Garcia MRN# [id number]   YOB: 1970 Age: 48 year old      Date of Admission:  6/19/2018    Primary care provider: No Ref-Primary, Physician          Assessment:   Christiano Garcia is a 48 year old male with a PMH significant for probable CAD, PUD and possible hx of acute intermittent porphyria, who presents with chest pain.   Work up in ED reveals: VSS. Initially tachycardic at 125, now improved. ED reported pt was quite anxious on arrival. BMP is unremarkable. CBC - WBC 12.0 otherwise unremarkable. Troponin <0.015 x2. D-dimer <0.3. CXR clear. CT chest negative for acute process. EKG SR with non specific ST and T wave abnormalities. He received 1L NS bolus x2 and 0.5 mg IV ativan x3.  Patient is being registered to observation for further evaluation and to rule out possible ACS.     1. Acute Chest pain: hx of abnormal stress test in 2013, was instructed to follow up with cardiology for possible CT angiogram but never followed up. Reports MI in [place], had angio but no stents, in 2000, no records available. Notes left sided chest pain that is crushing and radiates to his left arm, worse with exertion. Associated SOB and diaphoresis. Also notes LE edema and weight gain. Tele, one more troponin, echo and stress echo.   2. LE edema, weight gain and reports AMAYA - unclear etiology, trace edema noted in bilateral LE edema. CXR clear. Tele, echocardiogram  3. Syncope - occurred upon standing up, no hx of syncope. Endorses adequate PO hydration. IVFs, echo  4. Tinnitus and vision changes - complains of ringing in ears, new today. Worsening vision over past 3 months. Also notes intermittent \"static\" in his vision. Unclear etiology. BP within normal ranges. CT chest negative for dissection. Recommended MRI, pt unable to tolerate MRI, had versed with previous MRIs. Will defer to morning rounder if this should be pursued as an outpatient. Will get " "carotid US.  5. Enlarged lymph node inferior to R ear - he states it has been present for ~1 yr, tender to palpation. Notes one episode of night sweats last evening. Recommend outpatient follow.            Plan:     1. [place] to Observation  2. Continue telemetry  3. Follow serial troponins   4. Stress testing with Stress Echo and echocardiogram   5. Cont Aspirin EC 81 mg po daily  6. Blood pressure control  7. Morphine and nitroglycerine PRN for pain    8. regular diet, No caffeine if Nuclear testing selected  9. DVT prophylaxis: pt at low risk, encourage ambulation  10. Code Status: full code  11. Dispo: anticipate discharge tomorrow                  Chief Complaint:   Chest Pain         History of Present Illness:   Christaino Garcia is a 48 year old male with a PMH significant for probable CAD, PUD and possible hx of acute intermittent porphyria, who presents with chest pain.  The patient reports not feeling well last night, he noted left-sided chest pain with radiation to his left arm.  Took some aspirin and went to bed.  He said he woke up in the middle of the night drenched in sweat.  Today his chest pain has persisted, it is associated with dyspnea on exertion and lightheadedness.  He notes his pain worsened with walking.  He also notes ringing in his ears that is quite severe.  He is also noted swelling in his bilateral ankles and weight gain over the last few weeks.  He notes he is felt more fatigued with exertion recently.  He also states that around 1300 he is still up had a syncopal episode.  He denies any recent travel or surgery.  He reports a history of a heart attack many years ago but stent was placed.  Had an abnormal stress test a few years ago and was recommended to see cardiologist for further workup but never did do that.  He also notes worsening vision over the past 3 months.  He previously did not need glasses and now it wears readers.  He also states that he gets what he calls \"static\" in his " "vision intermittently.  He was previously on metoprolol but is no longer taking this.  He does smoke but denies alcohol and drug use.  He also notes nausea lymph node below his right ear.  He denies fever, chills, abdominal pain, vomiting, diarrhea or dysuria.             Past Medical History:     Past Medical History:   Diagnosis Date     Acute intermittent porphyria (H)     per patient, doubt raised by GI during 5/2013 admission     CAD (coronary artery disease)     s/p MI     Esophagitis      Kidney stones      PUD (peptic ulcer disease)                Past Surgical History:     Past Surgical History:   Procedure Laterality Date     ESOPHAGOSCOPY, GASTROSCOPY, DUODENOSCOPY (EGD), COMBINED  2/21/2013    Procedure: COMBINED ESOPHAGOSCOPY, GASTROSCOPY, DUODENOSCOPY (EGD), BIOPSY SINGLE OR MULTIPLE;  EGD with cold biopsy for h pylori;  Surgeon: Jen Galo MD;  Location:  GI     ESOPHAGOSCOPY, GASTROSCOPY, DUODENOSCOPY (EGD), COMBINED  3/8/2013    Procedure: COMBINED ESOPHAGOSCOPY, GASTROSCOPY, DUODENOSCOPY (EGD);  Esophagoscopy,Gastroscopy,Duodenoscopy- Room 504;  Surgeon: Jimenez King DO;  Location:  GI     LAPAROTOMY EXPLORATORY  1995               Social History:     Social History     Social History     Marital status: Single     Spouse name: N/A     Number of children: N/A     Years of education: N/A     Occupational History     Not on file.     Social History Main Topics     Smoking status: Current Some Day Smoker     Packs/day: 0.50     Years: 5.00     Smokeless tobacco: Never Used      Comment: patient smokes \"to raise his blood pressure\" but very rarely when he is not hospitalized     Alcohol use No     Drug use: No     Sexual activity: Not on file     Other Topics Concern     Not on file     Social History Narrative    Christiano has lived his life in [place], served in the [employer] and with [employer].                Family History:   Unknown, adopted         Allergies:    No " Known Allergies            Medications:     Prior to Admission medications    Medication Sig Last Dose Taking? Auth Provider   albuterol (ALBUTEROL) 108 (90 BASE) MCG/ACT inhaler Inhale 2 puffs into the lungs every 4 hours as needed for shortness of breath / dyspnea  Yes Phani Persaud MD   multivitamin, therapeutic with minerals (MULTI-VITAMIN) TABS Take 1 tablet by mouth daily Past Month at Unknown time Yes Unknown, Entered By History              Review of Systems:   A Comprehensive greater than 10 system review of systems was carried out.  Pertinent positives and negatives are noted above.  Otherwise negative for contributory information.     Constitutional, neuro, ENT, endocrine, pulmonary, cardiac, gastrointestinal, genitourinary, musculoskeletal, integument and psychiatric systems are negative, except as otherwise noted.           Physical Exam:   Blood pressure (!) 130/94, pulse 125, temperature 98  F (36.7  C), resp. rate 20, weight 99.8 kg (220 lb), SpO2 99 %.    GENERAL:  Comfortable.  PSYCH: pleasant, oriented, No acute distress.  HEENT:  Atraumatic, normocephalic. Normal conjunctiva, normal hearing, and oropharynx is normal.  NECK:  Supple, no neck vein distention  HEART:  Normal S1, S2 with no murmur, no pericardial rub, gallops or S3 or S4.  LUNGS:  Clear to auscultation, normal Respiratory effort. No wheezing, rales or ronchi.  GI:  Soft, normal bowel sounds. Non-tender, non distended.   EXTREMITIES:  Trace bilateral pedal edema, +2 pulses bilateral and equal.  SKIN:  Dry to touch, No rash, wound or ulcerations.  NEUROLOGIC:  CN 2-12 intact, BL 5/5 symmetric upper and lower extremity strength, sensation is intact with no focal deficits.              Data:     EKG demonstrates:  Sinus Rhythm, nonspecific ST-T wave changes.      Recent Labs  Lab 06/19/18  1427   WBC 12.0*   HGB 14.7   HCT 42.9   MCV 87          Recent Labs  Lab 06/19/18  1427      POTASSIUM 3.7   CHLORIDE 108   CO2 20    ANIONGAP 9   GLC 84   BUN 12   CR 0.87   GFRESTIMATED >90   GFRESTBLACK >90   TEJAL 8.4*       Recent Labs  Lab 06/19/18  1427   DD <0.3       Recent Labs  Lab 06/19/18  1706 06/19/18  1427   TROPI <0.015 <0.015       Recent Results (from the past 48 hour(s))   XR Chest 2 Views    Narrative    CHEST TWO VIEWS 6/19/2018 4:26 PM     HISTORY: Chest pain.    COMPARISON: September 2, 2014     FINDINGS: There are no acute infiltrates. The cardiac silhouette is  not enlarged. Pulmonary vasculature is unremarkable.      Impression    IMPRESSION: No acute disease.    VERNELL MORENO MD   CT Aortic Survey w Contrast    Narrative    CT AORTIC SURVEY WITH CONTRAST   6/19/2018 7:27 PM     HISTORY: Chest pain. Evaluate for dissection.    TECHNIQUE: 75 mL Isovue-370 IV were administered. After contrast  administration, volumetric helical sections were acquired from the  thoracic inlet to the ischial tuberosities. Coronal images were also  reconstructed. Radiation dose for this scan was reduced using  automated exposure control, adjustment of the mA and/or kV according  to patient size, or iterative reconstruction technique.    COMPARISON: None.    FINDINGS:    Chest: No evidence for thoracic aortic aneurysm or dissection. No  pleural or pericardial effusions. Calcified right hilar lymph nodes  are noted. No enlarged lymph nodes are identified in the chest. There  is a cluster of small calcified granulomas in the right middle lobe  laterally. There are a few small indeterminate pulmonary nodules  bilaterally, with the largest along the right minor fissure measuring  0.6 cm (series 10 image 177).    Abdomen and Pelvis: No evidence for aortoiliac aneurysm or dissection.  Scattered small calcified granulomas in the spleen. The liver,  gallbladder, spleen, adrenal glands, pancreas, and kidneys are  otherwise unremarkable. No bowel obstruction. Mild central prostatic  calcification. No free fluid in the pelvis. No free  intraperitoneal  air. The appendix is not visualized, however there is no significant  inflammatory change identified in the right lower quadrant.      Impression    IMPRESSION:   1. No acute abnormality in the chest, abdomen, or pelvis. No evidence  for aneurysm or dissection.   2. A few small indeterminate pulmonary nodules in both lungs, with the  largest measuring 0.6 cm. Please refer to pulmonary nodule follow-up  guidelines below.    Recommendations for an incidental lung nodule = or > 6mm to 8mm:    Low risk patients: Initial follow-up CT at 6-12 months, then  consider CT at 18-24 months if no change.    High risk patients: Initial follow-up CT at 6-12 months, then CT at  18-24 months if no change.    *Low Risk: Minimal or absent history of smoking or other known risk  factors.  *Nonsolid (ground-glass) or partly solid nodules may require longer  follow-up to exclude indolent adenocarcinoma.  *Recommendations based on Guidelines for the Management of Incidental  Pulmonary Nodules Detected at CT: From the Fleischner Society 2017,  Radiology 2017.                 Rosey Corcoran PA-C

## 2019-01-17 ENCOUNTER — HOSPITAL ENCOUNTER (EMERGENCY)
Facility: CLINIC | Age: 49
Discharge: HOME | End: 2019-01-17
Admitting: PHYSICIAN ASSISTANT

## 2019-01-17 ENCOUNTER — APPOINTMENT (OUTPATIENT)
Dept: GENERAL RADIOLOGY | Facility: CLINIC | Age: 49
End: 2019-01-17

## 2019-01-17 VITALS
SYSTOLIC BLOOD PRESSURE: 108 MMHG | BODY MASS INDEX: 27.48 KG/M2 | WEIGHT: 221 LBS | OXYGEN SATURATION: 97 % | HEIGHT: 75 IN | HEART RATE: 76 BPM | RESPIRATION RATE: 18 BRPM | DIASTOLIC BLOOD PRESSURE: 86 MMHG | TEMPERATURE: 98.1 F

## 2019-01-17 DIAGNOSIS — J01.90 ACUTE SINUSITIS, RECURRENCE NOT SPECIFIED, UNSPECIFIED LOCATION: Primary | ICD-10-CM

## 2019-01-17 DIAGNOSIS — R05.9 COUGH: ICD-10-CM

## 2019-01-17 DIAGNOSIS — R07.89 ATYPICAL CHEST PAIN: ICD-10-CM

## 2019-01-17 DIAGNOSIS — R04.2 HEMOPTYSIS: ICD-10-CM

## 2019-01-17 LAB
ANION GAP SERPL CALCULATED.3IONS-SCNC: 9 MMOL/L (ref 3–14)
BASOPHILS # BLD AUTO: 0.1 10E9/L (ref 0–0.2)
BASOPHILS NFR BLD AUTO: 0.7 %
BUN SERPL-MCNC: 11 MG/DL (ref 7–30)
CALCIUM SERPL-MCNC: 8.8 MG/DL (ref 8.5–10.1)
CHLORIDE SERPL-SCNC: 108 MMOL/L (ref 94–109)
CO2 SERPL-SCNC: 22 MMOL/L (ref 20–32)
CREAT SERPL-MCNC: 0.86 MG/DL (ref 0.66–1.25)
DIFFERENTIAL METHOD BLD: NORMAL
EOSINOPHIL # BLD AUTO: 0.4 10E9/L (ref 0–0.7)
EOSINOPHIL NFR BLD AUTO: 3.7 %
ERYTHROCYTE [DISTWIDTH] IN BLOOD BY AUTOMATED COUNT: 12.7 % (ref 10–15)
GFR SERPL CREATININE-BSD FRML MDRD: >90 ML/MIN/{1.73_M2}
GLUCOSE SERPL-MCNC: 101 MG/DL (ref 70–99)
HCT VFR BLD AUTO: 42.3 % (ref 40–53)
HGB BLD-MCNC: 14.2 G/DL (ref 13.3–17.7)
IMM GRANULOCYTES # BLD: 0.1 10E9/L (ref 0–0.4)
IMM GRANULOCYTES NFR BLD: 0.5 %
INTERPRETATION ECG - MUSE: NORMAL
LYMPHOCYTES # BLD AUTO: 2.2 10E9/L (ref 0.8–5.3)
LYMPHOCYTES NFR BLD AUTO: 21.4 %
MCH RBC QN AUTO: 29.5 PG (ref 26.5–33)
MCHC RBC AUTO-ENTMCNC: 33.6 G/DL (ref 31.5–36.5)
MCV RBC AUTO: 88 FL (ref 78–100)
MONOCYTES # BLD AUTO: 0.7 10E9/L (ref 0–1.3)
MONOCYTES NFR BLD AUTO: 7.2 %
NEUTROPHILS # BLD AUTO: 6.7 10E9/L (ref 1.6–8.3)
NEUTROPHILS NFR BLD AUTO: 66.5 %
NRBC # BLD AUTO: 0 10*3/UL
NRBC BLD AUTO-RTO: 0 /100
PLATELET # BLD AUTO: 327 10E9/L (ref 150–450)
POTASSIUM SERPL-SCNC: 3.5 MMOL/L (ref 3.4–5.3)
RBC # BLD AUTO: 4.82 10E12/L (ref 4.4–5.9)
SODIUM SERPL-SCNC: 139 MMOL/L (ref 133–144)
TROPONIN I SERPL-MCNC: <0.015 UG/L (ref 0–0.04)
WBC # BLD AUTO: 10.1 10E9/L (ref 4–11)

## 2019-01-17 PROCEDURE — 99285 EMERGENCY DEPT VISIT HI MDM: CPT | Mod: 25

## 2019-01-17 PROCEDURE — 96374 THER/PROPH/DIAG INJ IV PUSH: CPT

## 2019-01-17 PROCEDURE — 25000128 H RX IP 250 OP 636: Performed by: PHYSICIAN ASSISTANT

## 2019-01-17 PROCEDURE — 93005 ELECTROCARDIOGRAM TRACING: CPT

## 2019-01-17 PROCEDURE — 80048 BASIC METABOLIC PNL TOTAL CA: CPT | Performed by: PHYSICIAN ASSISTANT

## 2019-01-17 PROCEDURE — 94640 AIRWAY INHALATION TREATMENT: CPT

## 2019-01-17 PROCEDURE — 96361 HYDRATE IV INFUSION ADD-ON: CPT

## 2019-01-17 PROCEDURE — 84484 ASSAY OF TROPONIN QUANT: CPT | Performed by: PHYSICIAN ASSISTANT

## 2019-01-17 PROCEDURE — 71046 X-RAY EXAM CHEST 2 VIEWS: CPT

## 2019-01-17 PROCEDURE — 25000125 ZZHC RX 250: Performed by: PHYSICIAN ASSISTANT

## 2019-01-17 PROCEDURE — 85025 COMPLETE CBC W/AUTO DIFF WBC: CPT | Performed by: PHYSICIAN ASSISTANT

## 2019-01-17 RX ORDER — IPRATROPIUM BROMIDE AND ALBUTEROL SULFATE 2.5; .5 MG/3ML; MG/3ML
3 SOLUTION RESPIRATORY (INHALATION) ONCE
Status: COMPLETED | OUTPATIENT
Start: 2019-01-17 | End: 2019-01-17

## 2019-01-17 RX ORDER — KETOROLAC TROMETHAMINE 15 MG/ML
15 INJECTION, SOLUTION INTRAMUSCULAR; INTRAVENOUS ONCE
Status: COMPLETED | OUTPATIENT
Start: 2019-01-17 | End: 2019-01-17

## 2019-01-17 RX ORDER — DEXTROMETHORPHAN POLISTIREX 30 MG/5ML
60 SUSPENSION ORAL 2 TIMES DAILY
Qty: 140 ML | Refills: 0 | Status: SHIPPED | OUTPATIENT
Start: 2019-01-17 | End: 2019-01-24

## 2019-01-17 RX ORDER — ALBUTEROL SULFATE 90 UG/1
2 AEROSOL, METERED RESPIRATORY (INHALATION) EVERY 6 HOURS PRN
Qty: 1 INHALER | Refills: 0 | Status: SHIPPED | OUTPATIENT
Start: 2019-01-17 | End: 2019-02-16

## 2019-01-17 RX ADMIN — KETOROLAC TROMETHAMINE 15 MG: 15 INJECTION, SOLUTION INTRAMUSCULAR; INTRAVENOUS at 11:55

## 2019-01-17 RX ADMIN — IPRATROPIUM BROMIDE AND ALBUTEROL SULFATE 3 ML: .5; 3 SOLUTION RESPIRATORY (INHALATION) at 11:56

## 2019-01-17 RX ADMIN — SODIUM CHLORIDE 1000 ML: 9 INJECTION, SOLUTION INTRAVENOUS at 11:56

## 2019-01-17 ASSESSMENT — MIFFLIN-ST. JEOR: SCORE: 1958.08

## 2019-01-17 ASSESSMENT — ENCOUNTER SYMPTOMS
SINUS PAIN: 1
COUGH: 1
LIGHT-HEADEDNESS: 1
HEADACHES: 1

## 2019-01-17 NOTE — ED NOTES
Pt reports improvement in symptoms post neb. Cough lessened. Chest tightness lessened from 8 to a 5/10.

## 2019-01-17 NOTE — DISCHARGE INSTRUCTIONS
Discharge Instructions  Sinus Infection    You have acute sinusitis, or an infection of the sinuses. The sinuses are the hollow areas within the facial bones that are connected to the nasal opening. The most common cause of acute sinusitis is a virus infection associated with the common cold. Bacterial sinusitis occurs much less commonly, usually as a complication of viral sinusitis. Experts say that most sinusitis is caused by a virus within the first 7-10 days of illness. Antibiotics do nothing to help with virus infections, so most people do not need antibiotics for acute sinusitis.     Return to the Emergency Department if:  Your vision changes.  You are confused or have difficulty thinking clearly.  You have swelling around your eye.  You develop a severe headache or neck stiffness.  You have a fever over 101 degrees.  Your symptoms get worse and you are unable to see your primary doctor.    Follow-up with your doctor:   See your primary doctor in one week if not improving.    Treatment:  Pain relief -- Non-prescription pain medications, such as Tylenol  (acetaminophen) or Motrin  or Advil  (ibuprofen) are recommended for pain.  Do not use a medicine that you are allergic to, or if your doctor has told you not to use it.     Nasal irrigation -- Flushing the nose and sinuses with a saline solution several times per day can help to decrease pain caused by congestion.  Nasal decongestants -- Nasal decongestant sprays, including Afrin  (oxymetazoline) and Chandler-Synephrine  (phenylephrine) can be used to temporarily treat congestion. However, these sprays should not be used for more than two to three days due to the risk of rebound congestion (when the nose is congested constantly unless the medication is used repeatedly).  Nasal glucocorticoids -- These are prescription steroids delivered by a nasal spray that can help to reduce swelling inside the nose, usually within two to three days. These drugs have few side  "effects and dramatically relieve symptoms in most people.  If you use these in conjunction with Afrin  you will need to use at least 15 minutes prior to the nasal decongestant.    Do I need an antibiotic? -- Sometimes, but not always, antibiotics are used along with the above treatments.    Antibiotic Warning:   If you have been placed on antibiotics - watch for signs of allergic reaction.  These include rash, lip swelling, difficulty breathing, wheezing, and dizziness.  If you develop any of these symptoms, stop the antibiotic immediately and go to an Emergency Department or Urgent Care for evaluation.    Probiotics: If you have been given an antibiotic, you may want to also take a probiotic pill or eat yogurt with live cultures. Probiotics have \"good bacteria\" to help your intestines stay healthy. Studies have shown that probiotics help prevent diarrhea and other intestine problems (including C. diff infection) when you take antibiotics. You can buy these without a prescription in the pharmacy section of the store.   If you were given a prescription for medicine here today, be sure to read all of the information (including the package insert) that comes with your prescription.  This will include important information about the medicine, its side effects, and any warnings that you need to know about.  The pharmacist who fills the prescription can provide more information and answer questions you may have about the medicine.  If you have questions or concerns that the pharmacist cannot address, please call or return to the Emergency Department.   Opioid Medication Information    Pain medications are among the most commonly prescribed medicines, so we are including this information for all our patients. If you did not receive pain medication or get a prescription for pain medicine, you can ignore it.     You may have been given a prescription for an opioid (narcotic) pain medicine and/or have received a pain medicine " while here in the Emergency Department. These medicines can make you drowsy or impaired. You must not drive, operate dangerous equipment, or engage in any other dangerous activities while taking these medications. If you drive while taking these medications, you could be arrested for DUI, or driving under the influence. Do not drink any alcohol while you are taking these medications.     Opioid pain medications can cause addiction. If you have a history of chemical dependency of any type, you are at a higher risk of becoming addicted to pain medications.  Only take these prescribed medications to treat your pain when all other options have been tried. Take it for as short a time and as few doses as possible. Store your pain pills in a secure place, as they are frequently stolen and provide a dangerous opportunity for children or visitors in your house to start abusing these powerful medications. We will not replace any lost or stolen medicine.  As soon as your pain is better, you should flush all your remaining medication.     Many prescription pain medications contain Tylenol  (acetaminophen), including Vicodin , Tylenol #3 , Norco , Lortab , and Percocet .  You should not take any extra pills of Tylenol  if you are using these prescription medications or you can get very sick.  Do not ever take more than 3000 mg of acetaminophen in any 24 hour period.    All opioids tend to cause constipation. Drink plenty of water and eat foods that have a lot of fiber, such as fruits, vegetables, prune juice, apple juice and high fiber cereal.  Take a laxative if you don?t move your bowels at least every other day. Miralax , Milk of Magnesia, Colace , or Senna  can be used to keep you regular.      Remember that you can always come back to the Emergency Department if you are not able to see your regular doctor in the amount of time listed above, if you get any new symptoms, or if there is anything that worries  you.      Discharge Instructions  Bronchitis, Pneumonia, Bronchospasm    You were seen today for a chest infection or inflammation. If your doctor decided this was due to a bacterial infection, you may need an antibiotic. Sometimes these are caused by a virus, and then an antibiotic will not help.     Return to the Emergency Department if:  Your breathing gets much worse.  You are very weak, or feel much more ill.  You develop new symptoms, such as chest pain.  You cough up blood.  You are vomiting enough that you can?t keep fluids or your medicine down.    What can I do to help myself?  Fill any prescriptions the doctor gave you and take them right away--especially antibiotics. Be sure to finish the whole antibiotic prescription.  You may be given a prescription for an inhaler, which can help loosen tight air passages.  Use this as needed, but not more often than directed. Inhalers work much better when used with a spacer.   You may be given a prescription for a steroid to reduce inflammation. Used long-term, these can have many serious side effects, but for short courses these do not happen. You may notice restlessness or increased appetite.      You may use non-prescription cough or cold medicines. Cough medicines may help, but don?t make the cough go away completely.   Avoid smoke, because this can make your symptoms worse. If you smoke, this may be a good time to quit! Consider using nicotine lozenges, gum, or patches to reduce cravings.   If you have a fever, Tylenol  (acetaminophen), Motrin  (ibuprofen), or Advil  (ibuprofen) may help bring fever down and may help you feel more comfortable. Be sure to read and follow the package directions, and ask your doctor if you have questions.  Be sure to get your flu shot each year.  The pneumonia shot can help prevent pneumonia.  Probiotics: If you have been given an antibiotic, you may want to also take a probiotic pill or eat yogurt with live cultures. Probiotics  "have \"good bacteria\" to help your intestines stay healthy. Studies have shown that probiotics help prevent diarrhea and other intestine problems (including C. diff infection) when you take antibiotics. You can buy these without a prescription in the pharmacy section of the store.     If your doctor has told you to follow-up at your clinic, be sure to call right away and go to your appointment.  If there is any problem with keeping your appointment, call your doctor or return to the Emergency Department.    If you were given a prescription for medicine here today, be sure to read all of the information (including the package insert) that comes with your prescription.  This will include important information about the medicine, its side effects, and any warnings that you need to know about.  The pharmacist who fills the prescription can provide more information and answer questions you may have about the medicine.  If you have questions or concerns that the pharmacist cannot address, please call or return to the Emergency Department.     Opioid Medication Information    Pain medications are among the most commonly prescribed medicines, so we are including this information for all our patients. If you did not receive pain medication or get a prescription for pain medicine, you can ignore it.     You may have been given a prescription for an opioid (narcotic) pain medicine and/or have received a pain medicine while here in the Emergency Department. These medicines can make you drowsy or impaired. You must not drive, operate dangerous equipment, or engage in any other dangerous activities while taking these medications. If you drive while taking these medications, you could be arrested for DUI, or driving under the influence. Do not drink any alcohol while you are taking these medications.     Opioid pain medications can cause addiction. If you have a history of chemical dependency of any type, you are at a higher risk " of becoming addicted to pain medications.  Only take these prescribed medications to treat your pain when all other options have been tried. Take it for as short a time and as few doses as possible. Store your pain pills in a secure place, as they are frequently stolen and provide a dangerous opportunity for children or visitors in your house to start abusing these powerful medications. We will not replace any lost or stolen medicine.  As soon as your pain is better, you should flush all your remaining medication.     Many prescription pain medications contain Tylenol  (acetaminophen), including Vicodin , Tylenol #3 , Norco , Lortab , and Percocet .  You should not take any extra pills of Tylenol  if you are using these prescription medications or you can get very sick.  Do not ever take more than 3000 mg of acetaminophen in any 24 hour period.    All opioids tend to cause constipation. Drink plenty of water and eat foods that have a lot of fiber, such as fruits, vegetables, prune juice, apple juice and high fiber cereal.  Take a laxative if you don?t move your bowels at least every other day. Miralax , Milk of Magnesia, Colace , or Senna  can be used to keep you regular.      Remember that you can always come back to the Emergency Department if you are not able to see your regular doctor in the amount of time listed above, if you get any new symptoms, or if there is anything that worries you.

## 2019-01-17 NOTE — ED TRIAGE NOTES
Patient comes in for evaluation of cough and cold symptoms with hemoptysis for the last 3 days. Patient states sometimes it is chunky sputum and sometimes it is thinner. Patient does not appear in any distress.

## 2019-01-17 NOTE — ED PROVIDER NOTES
History     Chief Complaint:  Cough and Hemoptysis    HPI   Christiano Garcia is a 48 year old male who presents with ten days of productive cough and intermittent hemoptysis which started three days ago. He reports bright red blood comes out in clots. He endorses associated chest pain which feels like someone is pushing on his chest, tinnitus, headache and diaphoresis. Congestion and facial pain have been present for 10 days.  Patient had episode of extreme lightheadedness yesterday evening. He has been taking Sudafed and Mucinex and staying hydrated. Patient was recently diagnosed with bronchitis with similar symptoms, but he states today they are much worse. Patient has had an MI in the past, and states this feels different. In June 2018 patient had a normal echocardiogram and stress test. At that time he also had a CTA of his chest which showed pulmonary nodules. He does not follow with a cardiologist anymore.     Cardiac Risk Factors   Sex: Male   Tobacco: Positive  Hypertension: Negative  Diabetes: Negative  Hyperlipidemia: Negative  Family History: Negative    Allergies:  No known drug allergies.    Medications:    Albuterol  Multivitamin      Past Medical History:    Acute intermittent porphyria   CAD  Esophagitis   GI bleed  Kidney stones  PUD  SIRS    Past Surgical History:    EGD x 2  Laparotomy exploratory    Family History:    History reviewed. No pertinent family history.    Social History:  Presents to the ED alone.   Tobacco Use: Current Smoker (0.50 ppd)  Alcohol Use: No  PCP: Physician No Ref-Primary     Review of Systems   HENT: Positive for congestion, sinus pain and tinnitus.    Respiratory: Positive for cough.         Positive for hemoptysis.   Neurological: Positive for light-headedness and headaches.   All other systems reviewed and are negative.    Physical Exam   First Vitals:  Patient Vitals for the past 24 hrs:   BP Temp Temp src Pulse Resp SpO2 Height Weight   01/17/19 1238 108/86 98.1  F  "(36.7  C) Oral 76 18 97 % -- --   01/17/19 1104 (!) 137/110 98  F (36.7  C) Oral 115 18 98 % 1.905 m (6' 3\") 100.2 kg (221 lb)     Physical Exam  General: Mildly productive cough heard throughout exam.  Skin: Good turgor, no rash, no unusual bruising or prominent lesions.  HEENT: Head: Normocephalic, atraumatic, no visible masses.   Eyes: Conjunctiva clear.  Ears: EACs clear, TMs translucent.   Nose: Mild frontal and maxillary sinus tenderness to palpation. Mucosa mildly inflamed.   Throat/pharynx: Mucous membranes moist. Mildly erythematous oropharynx without tonsillar hypertrophy or exudate.   Neck: Supple, without lymphadenopathy.  Cardiac: Normal rate and regular rhythm, no murmur or gallop. No reproducible tenderness.   Lungs: Clear to auscultation.  Abdomen: Abdomen soft, non-tender. No rebound tenderness of guarding.   Musculoskeletal: Normal gait and station. No calf tenderness or swelling.   Neurologic: Oriented x 3. GCS: 15.  Psychiatric: Intact recent and remote memory, judgment and insight, normal mood and affect.     Emergency Department Course   ECG:  @ 1115  Indication: Chest pain  Vent. Rate 109 bpm. WV interval 168 ms. QRS duration 86 ms. QT/QTc 336/452 ms. P-R-T axis 62 -31 79.   Sinus tachycardia. Left axis deviation. Abnormal ECG.    Read @ 1117 by Dr. Verdugo.    Imaging:  Radiographic findings were communicated with the patient who voiced understanding of the findings.    XR Chest 2 Views   Final Result   IMPRESSION: PA and lateral views of the chest. Lungs are clear. Heart   is normal in size. No effusions are evident. No pneumothorax.      RENAY KUO MD        Laboratory:  CBC:  WBC 10.1, HGB 14.2, , otherwise WNL  BMP: Glucose 101 (H), otherwise WNL (Creatinine 0.86)  Troponin: <0.015    Interventions:  1155: Toradol, 15 mg, IV injection  1156: Normal Saline, 1 liter, IV bolus   1156: Duoneb, 3 mL, Nebulization    Emergency Department Course:  The patient arrived in triage where " vitals were measured and recorded.   The patient was then escorted back to the emergency department.   The patient's medical records were reviewed.  Nursing notes and vitals were reviewed.  1118: I performed an exam of the patient as documented above.  The above workup was undertaken.  1229: I rechecked the patient and discussed results.  Findings and plan explained to the Patient. Patient discharged home, status improved, with instructions regarding supportive care, medications, and reasons to return as well as the importance of close follow-up was reviewed. Patient was prescribed an albuterol inhaler, Robitussin and Augmentin.     Impression & Plan      Medical Decision Making:  Christiano Garcia is a 48 year old male who presented the ED today for evaluation of cough, nasal congestion, hemoptysis, chest pressure.  Details of the patient's history can be noted in the HPI.  Differential diagnosis included bronchitis, pneumonia, upper respiratory infection, influenza, pulmonary malignancy, sinusitis, ACS, dissection, pneumothorax, PE, costochondritis, pericarditis, amongst others.  Upon my exam had maxillary and frontal sinus tenderness palpation.  He has had a dry cough heard throughout exam.  Due to his cardiac history, basic laboratory analysis was completed.  No significant abnormalities on CBC or be.  Chest pain present for the past 3 days, did not feel that this need to be repeated.  No acute changes on ECG.  Chest x-ray clear.    Interventions here included nebulizer treatment as well as Toradol.  Upon reevaluation, patient noted improvement in his symptoms.  Patient's heart score is 3, low risk.  I suspect that his symptoms are due to an acute illness rather than cardiac etiology.  As patient has had sinus symptoms for the past 10 days, I will treat him for bacterial sinusitis.  Doubt more serious pathology such as meningitis, encephalitis, cavernous sinus thrombosis, ocular pathology, etc.  It was discussed in  great detail that the patient has had pulmonary nodules seen on previous CT scans.  Patient is very resistant to seeking medical care.  I strongly advised him to follow-up with primary care provider, information was provided to him.  The importance of this  in light of his pulmonary nodules and cardiac history, and persistent smoking history was stressed.  Smoking cessation advised to help with the patient's symptoms and long term health.  He was treated with Augmentin for sinus infection.  Albuterol inhaler and Robitussin provided to help with cough.  Again, he was strongly advised to follow-up with primary care provider for recheck of symptoms within the next few days.  He will return to the ED for any changing or worsening symptoms, worsening chest pain, shortness of breath, weakness, loss of consciousness, increased weakness, new concerns.  All questions were answered prior to patient's discharge.  He was in agreement with the treatment plan as stated above.    Diagnosis:    ICD-10-CM    1. Acute sinusitis, recurrence not specified, unspecified location J01.90    2. Cough R05 CBC with platelets differential     Basic metabolic panel     Troponin I   3. Hemoptysis R04.2    4. Atypical chest pain R07.89        Disposition:  Discharged to home.     Discharge Medications:     Medication List      Started    amoxicillin-clavulanate 875-125 MG tablet  Commonly known as:  AUGMENTIN  1 tablet, Oral, 2 TIMES DAILY     dextromethorphan 30 MG/5ML liquid  Commonly known as:  ROBITUSSIN 12 HOUR COUGH  60 mg, Oral, 2 TIMES DAILY        Modified    * albuterol 108 (90 Base) MCG/ACT inhaler  Commonly known as:  PROAIR HFA  2 puffs, Inhalation, EVERY 4 HOURS PRN  What changed:  Another medication with the same name was added. Make sure you understand how and when to take each.     * albuterol 108 (90 Base) MCG/ACT inhaler  Commonly known as:  PROAIR HFA/PROVENTIL HFA/VENTOLIN HFA  2 puffs, Inhalation, EVERY 6 HOURS PRN  What  changed:  You were already taking a medication with the same name, and this prescription was added. Make sure you understand how and when to take each.         * This list has 2 medication(s) that are the same as other medications prescribed for you. Read the directions carefully, and ask your doctor or other care provider to review them with you.                I, Alis Molina, am serving as a scribe on 1/17/2019 at 11:18 AM to personally document services performed by Fariha Villar PA-C, based on my observations and the provider's statements to me.   [facility] EMERGENCY DEPARTMENT      This was created at least in part with a voice recognition software. Mistakes/typos may be present.        Fariha Villar PA  01/17/19 9420

## 2019-01-17 NOTE — ED AVS SNAPSHOT
[facility] Emergency Department  [address]  [facility] [address]  Phone:  [phone]  Fax:  [phone]                                    Christiano Garcia   MRN: [id number]    Department:  [facility] Emergency Department   Date of Visit:  1/17/2019           After Visit Summary Signature Page    I have received my discharge instructions, and my questions have been answered. I have discussed any challenges I see with this plan with the nurse or doctor.    ..........................................................................................................................................  Patient/Patient Representative Signature      ..........................................................................................................................................  Patient Representative Print Name and Relationship to Patient    ..................................................               ................................................  Date                                   Time    ..........................................................................................................................................  Reviewed by Signature/Title    ...................................................              ..............................................  Date                                               Time          22EPIC Rev 08/18

## 2019-08-05 NOTE — PLAN OF CARE
Problem: Patient Care Overview  Goal: Plan of Care/Patient Progress Review  PRIMARY DIAGNOSIS: CHEST PAIN  OUTPATIENT/OBSERVATION GOALS TO BE MET BEFORE DISCHARGE:  1. Ruled out acute coronary syndrome (negative or stable Troponin):  Yes, trops neg x2  2. Pain Status:  Improved with rest  3. Appropriate provocative testing performed: Yes  - Stress Test Procedure: exercise stress ECHO in AM  - Interpretation of cardiac rhythm per telemetry tech: [id number]    4. Cleared by Consultants (if applicable):No  5. Return to near baseline physical activity: Yes  Discharge Planner Nurse   Safe discharge environment identified: Yes  Barriers to discharge: Yes       Entered by: Avril Dow 06/20/2018 12:23 AM     VSS, A&Ox4, up independently, on clear liquid diet, PIV-SL, trops negx2, currently rating chest pain a 7 that starts mid chest and radiates down the L shoulder and arm- refused pain meds at this time as rest seems to help, exercise stress ECHO for today     Please review provider order for any additional goals.   Nurse to notify provider when observation goals have been met and patient is ready for discharge.       Modify Regimen: Spironolactone 50 mg bid Continue Regimen: Benzamycin qd\\nDifferin 0.1% gel qd\\nMinocin qd Detail Level: Zone

## 2020-11-07 ENCOUNTER — NURSE TRIAGE (OUTPATIENT)
Dept: NURSING | Facility: CLINIC | Age: 50
End: 2020-11-07

## 2020-11-07 NOTE — TELEPHONE ENCOUNTER
"Pt calls in -  Says woke up around 0230 am  With concern of lower back pain going into groin     Then went to bathroom had a BM > says \" pure blood\"  Went back to bed     Then around 0930 am today > had another BM - again pur blood per pt    Pt says he is Not on blood thinners   This has not happened before to him     Is not dizzy - is not presently having pain     Per protocol advised pt to go to ED now for evaluation     [facility] will [facility]    Protocol and care advice reviewed  Caller states understanding of the recommended disposition  Advised to call back if further questions or concerns    Geo Morley , RN / [facility]            Reason for Disposition    [1] MODERATE rectal bleeding (small blood clots, passing blood without stool, or toilet water turns red) AND [2] more than once a day    Additional Information    Negative: Shock suspected (e.g., cold/pale/clammy skin, too weak to stand, low BP, rapid pulse)    Negative: Difficult to awaken or acting confused (e.g., disoriented, slurred speech)    Negative: Passed out (i.e., lost consciousness, collapsed and was not responding)    Negative: [1] Vomiting AND [2] contains red blood or black (\"coffee ground\") material  (Exception: few red streaks in vomit that only happened once)    Negative: Sounds like a life-threatening emergency to the triager    Negative: SEVERE rectal bleeding (large blood clots; on and off, or constant bleeding)    Negative: SEVERE dizziness (e.g., unable to stand, requires support to walk, feels like passing out now)    Protocols used: RECTAL BLEEDING-A-AH      "

## 2021-04-04 ENCOUNTER — HEALTH MAINTENANCE LETTER (OUTPATIENT)
Age: 51
End: 2021-04-04

## 2021-05-26 ENCOUNTER — RECORDS - HEALTHEAST (OUTPATIENT)
Dept: ADMINISTRATIVE | Facility: CLINIC | Age: 51
End: 2021-05-26

## 2021-09-18 ENCOUNTER — HEALTH MAINTENANCE LETTER (OUTPATIENT)
Age: 51
End: 2021-09-18

## 2021-11-23 ENCOUNTER — NURSE TRIAGE (OUTPATIENT)
Dept: NURSING | Facility: CLINIC | Age: 51
End: 2021-11-23

## 2021-11-23 NOTE — TELEPHONE ENCOUNTER
Pt called in states he has sinus pain.  The pain is above left eye.  The Pt has congestion.  Yellow green mucus.  The pain is 7/10 on the scale.  Cant breathing on his left nostril.  No sore throat.  No difficulty breathing.  No cough.  Little earache on the left side.  Pt states he has chronic sinus.  No fever.  The disposition is to be seen with in 4 hours.  The call is transferred to the .  Care advice given per protocol.  Patient agrees with care advice given.   Agreed to call back if he has additional symptoms or questions.      Dameon Llanos [facility] Nurse Advisor 11/23/2021 5:59 PM        Reason for Disposition    [1] SEVERE pain AND [2] not improved 2 hours after pain medicine    Additional Information    Negative: Severe difficulty breathing (e.g., struggling for each breath, speaks in single words)    Negative: Sounds like a life-threatening emergency to the triager    Negative: [1] Sinus infection AND [2] taking an antibiotic AND [3] symptoms continue    Negative: [1] Difficulty breathing AND [2] not from stuffy nose (e.g., not relieved by cleaning out the nose)    Negative: [1] SEVERE headache AND [2] fever    Negative: [1] Redness or swelling on the cheek, forehead or around the eye AND [2] fever    Negative: Fever > 104 F (40 C)    Negative: Patient sounds very sick or weak to the triager    Protocols used: SINUS PAIN OR CONGESTION-A-

## 2021-11-24 ENCOUNTER — VIRTUAL VISIT (OUTPATIENT)
Dept: URGENT CARE | Facility: CLINIC | Age: 51
End: 2021-11-24

## 2021-11-24 DIAGNOSIS — J01.10 ACUTE NON-RECURRENT FRONTAL SINUSITIS: Primary | ICD-10-CM

## 2021-11-24 PROCEDURE — 99203 OFFICE O/P NEW LOW 30 MIN: CPT | Mod: 95

## 2021-11-24 NOTE — PATIENT INSTRUCTIONS
1.  Take antibiotic according to instructions, with food to prevent stomach upset. If you are prone to stomach upset with antibiotics, I recommend adding a probiotic to this regimen.  Culturelle is a trusted brand.  2.  I recommend using Mucinex to help thin mucus secretions.  Adding a nasal steroid spray such as Flonase can also be helpful with clearing sinus congestion.  3.  Take Tylenol 650mg every 4 hours or ibuprofen 600mg every 6 hours by mouth for pain/fever.  Do not exceed 4000mg of acetaminophen or 2400mg of ibuprofen from any source in a 24 hour period.  Taking Tylenol and ibuprofen together may be helpful in reducing pain.   4.  Follow-up if you not having any improvement in your symptoms over the next 5 days.    Sinusitis  The sinuses are air-filled spaces within the bones of the face. They connect to the inside of the nose. Sinusitis is an inflammation of the tissue that lines the sinuses. Sinusitis can occur during a cold. It can also happen due to allergies to pollens and other particles in the air. Sinusitis can cause symptoms of sinus congestion and a feeling of fullness. A sinus infection causes fever, headache, and facial pain. There is often green or yellow fluid draining from the nose or into the back of the throat (post-nasal drip). You have been given antibiotics to treat this condition.  Home care    Take the full course of antibiotics as instructed. Do not stop taking them, even when you feel better.    Drink plenty of water, hot tea, and other liquids. This may help thin nasal mucus. It also may help your sinuses drain fluids.    Heat may help soothe painful areas of your face. Use a towel soaked in hot water. Or,  the shower and direct the warm spray onto your face. Using a vaporizer along with a menthol rub at night may also help soothe symptoms.     An expectorant with guaifenesin may help thin nasal mucus and help your sinuses drain fluids.    You can use an over-the-counter  decongestant, unless a similar medicine was prescribed to you. Nasal sprays work the fastest. Use one that contains phenylephrine or oxymetazoline. First blow your nose gently. Then use the spray. Do not use these medicines more often than directed on the label. If you do, your symptoms may get worse. You may also take pills that contain pseudoephedrine. Don t use products that combine multiple medicines. This is because side effects may be increased. Read labels. You can also ask the pharmacist for help. (People with high blood pressure should not use decongestants. They can raise blood pressure.)    Over-the-counter antihistamines may help if allergies contributed to your sinusitis.      Do not use nasal rinses or irrigation during an acute sinus infection, unless your healthcare provider tells you to. Rinsing may spread the infection to other areas in your sinuses.    Use acetaminophen or ibuprofen to control pain, unless another pain medicine was prescribed to you. If you have chronic liver or kidney disease or ever had a stomach ulcer, talk with your healthcare provider before using these medicines. (Aspirin should never be taken by anyone under age 18 who is ill with a fever. It may cause severe liver damage.)    Don't smoke. This can make symptoms worse.  Follow-up care  Follow up with your healthcare provider or our staff if you are better in 1 week.  When to seek medical advice  Call your healthcare provider if any of these occur:    Facial pain or headache that gets worse    Stiff neck    Unusual drowsiness or confusion    Swelling of your forehead or eyelids    Vision problems, such as blurred or double vision    Fever of 100.4 F (38 C) or higher, or as directed by your healthcare provider    Seizure    Breathing problems    Symptoms don't go away in 10 days  Prevention  Here are steps you can take to help prevent an infection:    Keep good hand washing habits.    Don t have close contact with people who  have sore throats, colds, or other upper respiratory infections.    Don t smoke, and stay away from secondhand smoke.    Stay up to date with of your vaccines.  Date Last Reviewed: 11/1/2017 2000-2017 [employer]. [address] [place] [address], [place] [address]. All rights reserved. This information is not intended as a substitute for professional medical care. Always follow your healthcare professional's instructions.

## 2021-11-24 NOTE — PROGRESS NOTES
Christiano Garcia is being evaluated via a billable telephone visit.      Assessment & Plan:     Symptoms c/w acute sinusitis, Rx Augmentin. Supportive treatments discussed. Pt declines COVID test.    See patient instructions below.  At the end of the encounter, I discussed diagnosis & medications. Discussed red flags for being seen in person at clinic/ER, as well as indications for follow up if no improvement. Patient understood and agreed to plan.       ICD-10-CM    1. Acute non-recurrent frontal sinusitis  J01.10 amoxicillin-clavulanate (AUGMENTIN) 875-125 MG tablet         Return in about 3 days (around 11/27/2021) for Follow up w/ primary care provider if not better.    Phone Call Duration : 8 minutes  Additional time spent documenting and reviewing chart:    JEANETH Gregorio, TRENA  [facility]  -----------------------------------------------------------------------------------------------------------------------------------------------------------------    Subjective:   Christiano Garcia is a 51 year old male who is contacted via telephone through scheduled urgent care virtual visit to discuss:   Chief Complaint   Patient presents with     Sinus Problem       Nasal congestion, sinus pressure/facial pain (above L eye), and L ear feeling plugged onset 2 days ago.Pt has been using Sudafed, Tylenol and Advil. He has a hx of recurrent sinusitis and symptoms feel similar. Patient reports no fever/chills, cough, sore throat, headache, chest pain, shortness of breath, abdominal pain, nausea, vomiting, diarrhea, rash, or any other symptoms.     Past Medical History:   Diagnosis Date     Acute intermittent porphyria (H)     per patient, doubt raised by GI during 5/2013 admission     CAD (coronary artery disease)     s/p MI     Esophagitis      Kidney stones      PUD (peptic ulcer disease)          Objective:   Gen:  NAD  Pulm: non-labored work of breathing    No results found for any visits on  11/24/21.    Patient Instructions   1.  Take antibiotic according to instructions, with food to prevent stomach upset. If you are prone to stomach upset with antibiotics, I recommend adding a probiotic to this regimen.  Culturelle is a trusted brand.  2.  I recommend using Mucinex to help thin mucus secretions.  Adding a nasal steroid spray such as Flonase can also be helpful with clearing sinus congestion.  3.  Take Tylenol 650mg every 4 hours or ibuprofen 600mg every 6 hours by mouth for pain/fever.  Do not exceed 4000mg of acetaminophen or 2400mg of ibuprofen from any source in a 24 hour period.  Taking Tylenol and ibuprofen together may be helpful in reducing pain.   4.  Follow-up if you not having any improvement in your symptoms over the next 5 days.    Sinusitis  The sinuses are air-filled spaces within the bones of the face. They connect to the inside of the nose. Sinusitis is an inflammation of the tissue that lines the sinuses. Sinusitis can occur during a cold. It can also happen due to allergies to pollens and other particles in the air. Sinusitis can cause symptoms of sinus congestion and a feeling of fullness. A sinus infection causes fever, headache, and facial pain. There is often green or yellow fluid draining from the nose or into the back of the throat (post-nasal drip). You have been given antibiotics to treat this condition.  Home care    Take the full course of antibiotics as instructed. Do not stop taking them, even when you feel better.    Drink plenty of water, hot tea, and other liquids. This may help thin nasal mucus. It also may help your sinuses drain fluids.    Heat may help soothe painful areas of your face. Use a towel soaked in hot water. Or,  the shower and direct the warm spray onto your face. Using a vaporizer along with a menthol rub at night may also help soothe symptoms.     An expectorant with guaifenesin may help thin nasal mucus and help your sinuses drain  fluids.    You can use an over-the-counter decongestant, unless a similar medicine was prescribed to you. Nasal sprays work the fastest. Use one that contains phenylephrine or oxymetazoline. First blow your nose gently. Then use the spray. Do not use these medicines more often than directed on the label. If you do, your symptoms may get worse. You may also take pills that contain pseudoephedrine. Don t use products that combine multiple medicines. This is because side effects may be increased. Read labels. You can also ask the pharmacist for help. (People with high blood pressure should not use decongestants. They can raise blood pressure.)    Over-the-counter antihistamines may help if allergies contributed to your sinusitis.      Do not use nasal rinses or irrigation during an acute sinus infection, unless your healthcare provider tells you to. Rinsing may spread the infection to other areas in your sinuses.    Use acetaminophen or ibuprofen to control pain, unless another pain medicine was prescribed to you. If you have chronic liver or kidney disease or ever had a stomach ulcer, talk with your healthcare provider before using these medicines. (Aspirin should never be taken by anyone under age 18 who is ill with a fever. It may cause severe liver damage.)    Don't smoke. This can make symptoms worse.  Follow-up care  Follow up with your healthcare provider or our staff if you are better in 1 week.  When to seek medical advice  Call your healthcare provider if any of these occur:    Facial pain or headache that gets worse    Stiff neck    Unusual drowsiness or confusion    Swelling of your forehead or eyelids    Vision problems, such as blurred or double vision    Fever of 100.4 F (38 C) or higher, or as directed by your healthcare provider    Seizure    Breathing problems    Symptoms don't go away in 10 days  Prevention  Here are steps you can take to help prevent an infection:    Keep good hand washing  habits.    Don t have close contact with people who have sore throats, colds, or other upper respiratory infections.    Don t smoke, and stay away from secondhand smoke.    Stay up to date with of your vaccines.  Date Last Reviewed: 11/1/2017 2000-2017 [employer]. [address] [place] [address]. All rights reserved. This information is not intended as a substitute for professional medical care. Always follow your healthcare professional's instructions.

## 2022-01-03 ENCOUNTER — E-VISIT (OUTPATIENT)
Dept: URGENT CARE | Facility: URGENT CARE | Age: 52
End: 2022-01-03

## 2022-01-03 DIAGNOSIS — B96.89 ACUTE BACTERIAL SINUSITIS: Primary | ICD-10-CM

## 2022-01-03 DIAGNOSIS — J01.90 ACUTE BACTERIAL SINUSITIS: Primary | ICD-10-CM

## 2022-01-03 PROCEDURE — 99421 OL DIG E/M SVC 5-10 MIN: CPT | Performed by: PHYSICIAN ASSISTANT

## 2022-01-03 RX ORDER — AMOXICILLIN 875 MG
875 TABLET ORAL 2 TIMES DAILY
Qty: 20 TABLET | Refills: 0 | Status: SHIPPED | OUTPATIENT
Start: 2022-01-03 | End: 2022-01-13

## 2022-01-03 NOTE — PATIENT INSTRUCTIONS
Dear Christiano Garcia    After reviewing your responses, I've been able to diagnose you with?a sinus infection caused by bacteria.?     Based on your responses and diagnosis, I have prescribed amox to treat your symptoms. I have sent this to your pharmacy.?     It is also important to stay well hydrated, get lots of rest and take over-the-counter decongestants,?tylenol?or ibuprofen if you?are able to?take those medications per your primary care provider to help relieve discomfort.?     It is important that you take?all of?your prescribed medication even if your symptoms are improving after a few doses.? Taking?all of?your medicine helps prevent the symptoms from returning.?     If your symptoms worsen, you develop severe headache, vomiting, high fever (>102), or are not improving in 7 days, please contact your primary care provider for an appointment or visit any of our convenient Walk-in Care or Urgent Care Centers to be seen which can be found on our website?here.?     Thanks again for choosing?us?as your health care partner,?   ?  Mainor Gant PA-C?

## 2022-01-20 ENCOUNTER — E-VISIT (OUTPATIENT)
Dept: URGENT CARE | Facility: CLINIC | Age: 52
End: 2022-01-20

## 2022-01-20 DIAGNOSIS — J01.90 ACUTE BACTERIAL SINUSITIS: Primary | ICD-10-CM

## 2022-01-20 DIAGNOSIS — B96.89 ACUTE BACTERIAL SINUSITIS: Primary | ICD-10-CM

## 2022-01-20 PROCEDURE — 99421 OL DIG E/M SVC 5-10 MIN: CPT | Performed by: FAMILY MEDICINE

## 2022-01-20 NOTE — PATIENT INSTRUCTIONS
Dear Christiano Garcia    After reviewing your responses, I've been able to diagnose you with?a sinus infection caused by bacteria.?     Based on your responses and diagnosis, I have prescribed Augmentin to treat your symptoms. I have sent this to your pharmacy.?     It is also important to stay well hydrated, get lots of rest and take over-the-counter decongestants,?tylenol?or ibuprofen if you?are able to?take those medications per your primary care provider to help relieve discomfort.?     It is important that you take?all of?your prescribed medication even if your symptoms are improving after a few doses.? Taking?all of?your medicine helps prevent the symptoms from returning.?     If your symptoms worsen, you develop severe headache, vomiting, high fever (>102), or are not improving in 7 days, please contact your primary care provider for an appointment or visit any of our convenient Walk-in Care or Urgent Care Centers to be seen which can be found on our website?here.?     Thanks again for choosing?us?as your health care partner,?   ?  Carli Carroll MD?

## 2022-02-03 ENCOUNTER — E-VISIT (OUTPATIENT)
Dept: URGENT CARE | Facility: URGENT CARE | Age: 52
End: 2022-02-03

## 2022-02-03 DIAGNOSIS — R09.81 NASAL CONGESTION: ICD-10-CM

## 2022-02-03 DIAGNOSIS — J01.90 ACUTE SINUSITIS, RECURRENCE NOT SPECIFIED, UNSPECIFIED LOCATION: ICD-10-CM

## 2022-02-03 PROCEDURE — 99421 OL DIG E/M SVC 5-10 MIN: CPT | Performed by: NURSE PRACTITIONER

## 2022-02-03 RX ORDER — GUAIFENESIN 1200 MG/1
1200 TABLET, EXTENDED RELEASE ORAL 2 TIMES DAILY
Qty: 60 TABLET | Refills: 0 | Status: SHIPPED | OUTPATIENT
Start: 2022-02-03 | End: 2022-05-10

## 2022-02-03 RX ORDER — FLUTICASONE PROPIONATE 50 MCG
1 SPRAY, SUSPENSION (ML) NASAL DAILY
Qty: 11 ML | Refills: 0 | Status: SHIPPED | OUTPATIENT
Start: 2022-02-03 | End: 2022-05-10

## 2022-02-03 NOTE — PATIENT INSTRUCTIONS
The symptoms you describe suggest a viral cause, which is much more common than a bacterial cause. Antibiotics will treat bacterial infections, but have no effect on viral infections. If possible, especially if improving, start with symptom care for the first 7-10 days, then consider seeking further treatment or taking an antibiotic. Bacterial infections generally are more severe, including symptoms such as pus, fever over 101degrees F, or rapidly worsening.  You may want to try warm salt water gargles or rinses to feel better or help prevent another bout in the future. Mix 1 teaspoon of salt in 8 ounces of water, gargle, and spit. Do this several times a day for several days. Do not swallow the mixture.    You may want to try a nasal lavage (also known as nasal irrigation). You can find over-the-counter products, such as Neti-Pot, at retail locations or make your own at home. Instructions for homemade nasal lavage and more information on the process are available online at http://www.aafp.org/afp/2009/1115/p1121.html.      Viral Upper Respiratory Illness (Adult)    You have a viral upper respiratory illness (URI), which is another term for the common cold. This illness is contagious during the first few days. It is spread through the air by coughing and sneezing. It may also be spread by direct contact (touching the sick person and then touching your own eyes, nose, or mouth). Frequent handwashing will decrease risk of spread. Most viral illnesses go away within 7 to 10 days with rest and simple home remedies. Sometimes the illness may last for several weeks. Antibiotics will not kill a virus, and they are generally not prescribed for this condition.  Home care    If symptoms are severe, rest at home for the first 2 to 3 days. When you resume activity, don't let yourself get too tired.    Don't smoke. If you need help stopping, talk with your healthcare provider.    Avoid being exposed to cigarette smoke (yours  or others ).    You may use acetaminophen or ibuprofen to control pain and fever, unless another medicine was prescribed. If you have chronic liver or kidney disease, have ever had a stomach ulcer or gastrointestinal bleeding, or are taking blood-thinning medicines, talk with your healthcare provider before using these medicines. Aspirin should never be given to anyone under 18 years of age who is ill with a viral infection or fever. It may cause severe liver or brain damage.    Your appetite may be poor, so a light diet is fine. Stay well hydrated by drinking 6 to 8 glasses of fluids per day (water, soft drinks, juices, tea, or soup). Extra fluids will help loosen secretions in the nose and lungs.    Over-the-counter cold medicines will not shorten the length of time you re sick, but they may be helpful for the following symptoms: cough, sore throat, and nasal and sinus congestion. If you take prescription medicines, ask your healthcare provider or pharmacist which over-the-counter medicines are safe to use. (Note: Don't use decongestants if you have high blood pressure.)  Follow-up care  Follow up with your healthcare provider, or as advised.  When to seek medical advice  Call your healthcare provider right away if any of these occur:    Cough with lots of colored sputum (mucus)    Severe headache; face, neck, or ear pain    Difficulty swallowing due to throat pain    Fever of 100.4 F (38 C) or higher, or as directed by your healthcare provider  Call 911  Call 911 if any of these occur:    Chest pain, shortness of breath, wheezing, or difficulty breathing    Coughing up blood    Very severe pain with swallowing, especially if it goes along with a muffled voice   [employer] last reviewed this educational content on 6/1/2018 2000-2021 [employer]. All rights reserved. This information is not intended as a substitute for professional medical care. Always follow your healthcare professional's  instructions.          When to Use Antibiotics    Antibiotics are medicines used to treat infections caused by bacteria. They don t work for an illness caused by a virus. And they don't work for an allergic reaction. In fact, taking antibiotics for reasons other than an infection by bacteria can cause problems. You may have side effects from the medicine. And if you need an antibiotic in the future, it may not work well. This is because the bacteria can become immune to the medicine. You can also get a type of diarrhea that's hard to treat. This diarrhea is called C. diff.   When antibiotics likely won t help  Your healthcare provider won t usually give you antibiotics for the conditions listed below. You can help by not asking for them if you have:     A cold. This type of illness is caused by a virus. It can cause a runny nose, stuffed-up nose, sneezing, coughing, and headache. You may also have mild body aches and low fever. A cold gets better on its own in a few days to a week.    The flu (influenza). This is a respiratory illness caused by a virus. The flu usually goes away on its own in a week or so. It can cause fever, body aches, sore throat, and tiredness.    Bronchitis. This is an infection in the lungs. It is most often caused by a virus. You may have coughing, phlegm, body aches, and a low fever. A common type of bronchitis is known as a chest cold. This is called acute bronchitis. This often happens after you have a respiratory infection like a cold. Bronchitis can take weeks to go away. Antibiotics often don t help.    Most sore throats. Sore throats are most often caused by viruses. Your throat may feel scratchy or achy. It may hurt to swallow. You may also have a low fever and body aches. A sore throat usually gets better in a few days.    Most outer ear infections. An ear infection may be caused by a virus or bacteria. It causes pain in the ear. Antibiotics by mouth usually don t help. Low-dose  antibiotic ear drops work much better.    Some inner ear infections. An inner ear infection (otitis media) can be caused by a virus in the ear. It can also cause pain and a high fever. Most older children with low-grade fever don't need to be treated with antibiotics.    Most sinus infections. This is also known as sinusitis. This kind of infection causes sinus pain and swelling, and a runny nose. In most cases, it goes away on its own. Antibiotics don t make recovery quicker.    Allergic rhinitis. This is a set of symptoms caused by an allergic reaction. You may have sneezing, a runny nose, itchy or watery eyes, or a sore throat. Allergies are not treated with antibiotics.    Low fever. A mild fever that s less than 100.4 F (38 C) most likely doesn t need to be treated with antibiotics.   When antibiotics can help  Antibiotics can be used to treat:                                                       Strep throat. This is a throat infection caused by a certain type of bacteria. Symptoms of strep throat include a sore throat, white patches on the tonsils, red spots on the roof of the mouth, fever, body aches, and nausea and vomiting. Strep throat almost never causes a cough.    Urinary tract infection (UTI). This is an infection of the bladder and the tube that takes urine out of the body. It is caused by bacteria. It can cause burning pain and urine that s cloudy or tinted with blood. UTIs are very common. Antibiotics usually help treat them.    Some outer ear infections. In some cases, a healthcare provider may prescribe antibiotics by mouth for an ear infection. You may need a test to show the cause of the ear infection.    Some sinus infections. In some cases, your healthcare provider may give you antibiotics. He or she may first need to make sure your symptoms aren t caused by something else. This may be a virus, fungus, allergies, or air pollutants such as smoke.   Your healthcare provider may give you  antibiotics if you have a condition that can affect your immune system. This includes diabetes or cancer.  Self-care at home  If your infection can t be treated with antibiotics, you can take other steps to feel better. Try the remedies below. In general:     Rest and sleep as much as needed.    Drink water and other clear fluids.    Don t smoke. Stay away from smoke from other people.    Use over-the-counter medicine such as acetaminophen or ibuprofen to ease pain or fever, as directed by your healthcare provider.  To treat sinus pain or nasal stuffiness:    Put a warm, moist cloth on your face where you feel sinus pain or pressure.    Try a nasal spray with medicine or saline. Use as directed by your healthcare provider.    Breathe in steam from a hot shower.    Use a humidifier or cool mist vaporizer.   To quiet a cough:     Use a humidifier or cool mist vaporizer.    Breathe in steam from a hot shower.    Suck on cough lozenges.   To sooth a sore throat:     Suck on ice chips, frozen ice pops, or lozenges.    Use a sore throat spray.    Use a humidifier or cool mist vaporizer.    Gargle with saltwater.    Drink warm liquids.    Take ibuprofen to reduce swelling and pain.  To ease ear pain:     Hold a warm, moist washcloth on the ear for 10 minutes at a time.  [employer] last reviewed this educational content on 12/1/2019 2000-2021 [employer]. All rights reserved. This information is not intended as a substitute for professional medical care. Always follow your healthcare professional's instructions.        Dear Christiano Garcia    After reviewing your responses, I've been able to diagnose you with?a sinus infection caused by a virus.?     Based on your responses and diagnosis, I have prescribed flonase to treat your symptoms. I have sent this to your pharmacy.?     It is also important to stay well hydrated, get lots of rest and take over-the-counter decongestants,?tylenol?or ibuprofen if you?are able  to?take those medications per your primary care provider to help relieve discomfort.?     It is important that you take?all of?your prescribed medication even if your symptoms are improving after a few doses.? Taking?all of?your medicine helps prevent the symptoms from returning.?     If your symptoms worsen, you develop severe headache, vomiting, high fever (>102), or are not improving in 7 days, please contact your primary care provider for an appointment or visit any of our convenient Walk-in Care or Urgent Care Centers to be seen which can be found on our website?here.?     Thanks again for choosing?us?as your health care partner,?   ?  Niya Howell, SO CNP?

## 2022-02-09 ENCOUNTER — HOSPITAL ENCOUNTER (EMERGENCY)
Facility: CLINIC | Age: 52
Discharge: HOME | End: 2022-02-09
Attending: EMERGENCY MEDICINE | Admitting: EMERGENCY MEDICINE

## 2022-02-09 VITALS
WEIGHT: 220 LBS | HEART RATE: 104 BPM | HEIGHT: 74 IN | BODY MASS INDEX: 28.23 KG/M2 | TEMPERATURE: 97.7 F | DIASTOLIC BLOOD PRESSURE: 91 MMHG | RESPIRATION RATE: 18 BRPM | SYSTOLIC BLOOD PRESSURE: 136 MMHG | OXYGEN SATURATION: 98 %

## 2022-02-09 DIAGNOSIS — I88.9 SUBMANDIBULAR LYMPHADENITIS: ICD-10-CM

## 2022-02-09 PROCEDURE — 99282 EMERGENCY DEPT VISIT SF MDM: CPT

## 2022-02-09 RX ORDER — CLINDAMYCIN HCL 300 MG
300 CAPSULE ORAL 4 TIMES DAILY
Qty: 40 CAPSULE | Refills: 0 | Status: SHIPPED | OUTPATIENT
Start: 2022-02-09 | End: 2022-02-19

## 2022-02-09 ASSESSMENT — ENCOUNTER SYMPTOMS: FACIAL SWELLING: 1

## 2022-02-09 ASSESSMENT — MIFFLIN-ST. JEOR: SCORE: 1922.66

## 2022-02-09 NOTE — ED TRIAGE NOTES
Here for right ear pain and right sided lymph nodes swelling/pain started about 7pm associated with ringing to right ear.Took Advil at 7pm and 1 hour prior to arrival, but not helping. ABCs intact.

## 2022-02-09 NOTE — ED PROVIDER NOTES
"  History     Chief Complaint:  Otalgia     HPI   Christiano Garcia is a 51 year old male with history of CAD and GI bleed who presents with otalgia. The patient reports 3-4 days of right ear pain and developed pain and swelling in his lymph nodes last night. The patient recently finished a 7 day course of Augmentin that he was prescribed for a sinus infection. He has been taking ibuprofen for pain management at home.    Review of Systems   HENT: Positive for ear pain (right) and facial swelling (lymph nodes, right).    All other systems reviewed and are negative.    Allergies:  The patient does not have any allergies    Medications:  Fluticasone  Guaifenesin    Past Medical History:     CAD  Kidney stones  Peptic ulcer disease  Sciatica    Past Surgical History:    EGD combined  Laparotomy exploratory  Spine surgery  Appendectomy    Social History:  Presents alone    Physical Exam     Patient Vitals for the past 24 hrs:   BP Temp Temp src Pulse Resp SpO2 Height Weight   02/09/22 0352  136/91 97.7  F (36.5  C) Temporal 104 18 98 % 1.88 m (6' 2\") 99.8 kg (220 lb)     Physical Exam  Nursing note and vitals reviewed.  Constitutional: Cooperative.   HENT:   Mouth/Throat: Mucous membranes are normal. Oropharynx is normal.  Submandibular lymphadenopathy near angle of right mandibular ramus.  TMs normal. Auditory canal is normal.    No submental swelling.  No neck rigidity  Eyes: No discharge  Cardiovascular: Normal rate, regular rhythm and normal heart sounds.  No murmur.  Pulmonary/Chest: Effort normal and breath sounds normal. No respiratory distress. No wheezes.   Neurological: Alert. Oriented x4  Skin: Skin is warm and dry. No rash noted on neck.   Psychiatric: Normal mood and affect.     Emergency Department Course        Reviewed:  I reviewed nursing notes, vitals, past medical history and Care Everywhere    Assessments:  0433 I obtained history and examined the patient as noted above.     Disposition:  The patient was " discharged to home.     Impression & Plan     Medical Decision Making:  Christiano Garcia is a 51 year old gentleman who presents with painful right submandibular lymphadenopathy. No sign of Kevon angina or other deep space tissue infection that would require imaging. He has normal swallow and is tolerating the secretions well. No evidence of otitis media, otitis externa, or mastoiditis. He has already completed a course of Augmentin and I suspect infected or obstructed lymph nodes is a cause of his symptoms. I have recommended cold packs and a 10 day course of clindamycin with a plan for otolaryngology follow up if not improving.    Diagnosis:    ICD-10-CM    1. Submandibular lymphadenitis  I88.9      Discharge Medications:  Discharge Medication List as of 2/9/2022  4:40 AM      START taking these medications    Details   clindamycin (CLEOCIN) 300 MG capsule Take 1 capsule (300 mg) by mouth 4 times daily for 10 days, Disp-40 capsule, R-0, Local Print             Scribe Disclosure:  I, Jorge Sánchez, am serving as a scribe at 4:32 AM on 2/9/2022 to document services personally performed by Phani Persaud MD based on my observations and the provider's statements to me.         Phani Persaud MD  02/09/22 4316

## 2022-03-24 ENCOUNTER — VIRTUAL VISIT (OUTPATIENT)
Dept: FAMILY MEDICINE | Facility: CLINIC | Age: 52
End: 2022-03-24

## 2022-03-24 ENCOUNTER — NURSE TRIAGE (OUTPATIENT)
Dept: NURSING | Facility: CLINIC | Age: 52
End: 2022-03-24

## 2022-03-24 ENCOUNTER — E-VISIT (OUTPATIENT)
Dept: URGENT CARE | Facility: CLINIC | Age: 52
End: 2022-03-24

## 2022-03-24 DIAGNOSIS — J01.01 ACUTE RECURRENT MAXILLARY SINUSITIS: Primary | ICD-10-CM

## 2022-03-24 DIAGNOSIS — H92.09 OTALGIA, UNSPECIFIED LATERALITY: Primary | ICD-10-CM

## 2022-03-24 PROCEDURE — 99213 OFFICE O/P EST LOW 20 MIN: CPT | Mod: 95 | Performed by: PHYSICIAN ASSISTANT

## 2022-03-24 PROCEDURE — 99207 PR NON-BILLABLE SERV PER CHARTING: CPT | Performed by: PHYSICIAN ASSISTANT

## 2022-03-24 RX ORDER — CLINDAMYCIN HCL 300 MG
300 CAPSULE ORAL 3 TIMES DAILY
Qty: 30 CAPSULE | Refills: 0 | Status: SHIPPED | OUTPATIENT
Start: 2022-03-24 | End: 2022-04-03

## 2022-03-24 NOTE — PATIENT INSTRUCTIONS
Maryr Christiano Garcia,    We are sorry you are not feeling well. Based on the responses you provided, it is recommended that you be seen in-person in urgent care so we can better evaluate your symptoms. Please click here to find the nearest urgent care location to you.   You will not be charged for this Visit. Thank you for trusting us with your care.    Delia Plaza PA-C, TRENA

## 2022-03-24 NOTE — PROGRESS NOTES
"Christiano is a 52 year old who is being evaluated via a billable telephone visit.      What phone number would you like to be contacted at? [phone]  How would you like to obtain your AVS? MyChart    Assessment & Plan     Acute recurrent maxillary sinusitis  H/o recurrent sinusitis.  Will treat per pt preference  Tylenol/ibuprofen with food  Probiotics  Nasal saline spray  Follow-up in clinic/urgent care/ER if symptoms persist over next 3-4 days, sooner if symptoms worsen. The patient indicates understanding of these issues and agrees with the plan.    - clindamycin (CLEOCIN) 300 MG capsule; Take 1 capsule (300 mg) by mouth 3 times daily for 10 days     Tobacco Cessation:   reports that he has been smoking. He has a 2.50 pack-year smoking history. He has never used smokeless tobacco.      BMI:   Estimated body mass index is 28.25 kg/m  as calculated from the following:    Height as of 2/9/22: 1.88 m (6' 2\").    Weight as of 2/9/22: 99.8 kg (220 lb).           No follow-ups on file.    Yadira Cordova PA-C  [facility]    Mirlande Alvarado is a 52 year old who presents for the following health issues     HPI     Acute Illness    Onset/Duration: 2 days  Patient has h/o recurrent sinusitis. Patient states clindamycin works well for pt.   Symptoms:  Fever: no  Chills/Sweats: YES  Headache (location?): no  Sinus Pressure: YES  Conjunctivitis:  no  Ear Pain: YES: right  Rhinorrhea: no  Congestion: YES  Sore Throat: YES  Cough: no  Wheeze: no  Decreased Appetite: no  Nausea: no  Vomiting: no  Diarrhea: no  Dysuria/Freq.: no  Dysuria or Hematuria: no  Fatigue/Achiness: YES  Sick/Strep Exposure: no  Therapies tried and outcome: tylenol, ibuprofen and sudafed        Review of Systems   Constitutional, HEENT, cardiovascular, pulmonary, gi and gu systems are negative, except as otherwise noted.      Objective           Vitals:  No vitals were obtained today due to virtual visit.    Physical Exam "   healthy, alert and no distress  PSYCH: Alert and oriented times 3; coherent speech, normal   rate and volume, able to articulate logical thoughts, able   to abstract reason, no tangential thoughts, no hallucinations   or delusions  His affect is normal and pleasant  RESP: No cough, no audible wheezing, able to talk in full sentences  Remainder of exam unable to be completed due to telephone visits                Phone call duration: 11 minutes

## 2022-03-24 NOTE — TELEPHONE ENCOUNTER
"Did a virtual visit but does not feel that the provider understood what was happening to him    Right lymph node is swollen and pain in ear and congestion also running down back of throat     Has been trying tylenol, sudefed and ibuprofen     Has a history of chronic sinus infection that lead into an ear infection     \"There is no doubt that I have an Inner ear infection\"    Refuses triage as he already did the E-visit    Wants someone to send him in a prescription     States that \"It is NOT COVID\"    Wants to talk to another provider.     Advised that he can go to , PCP office or he can try a virtual visit with PCP office. Cannot guarantee a different outcome. Transferred call to scheduling to schedule virtual visit.     Reason for Disposition    Caller has already spoken to PCP or another triager    Caller has already spoken with another triager or PCP AND has further questions AND triager able to answer questions.    Additional Information    Negative: Caller is angry or rude (e.g., hangs up, verbally abusive, yelling)    Negative: Caller hangs up    Protocols used: INFORMATION ONLY CALL - NO TRIAGE-A-, NO CONTACT OR DUPLICATE CONTACT CALL-A-    Angela Waldron RN on 3/24/2022 at 5:14 AM      "

## 2022-04-30 ENCOUNTER — HEALTH MAINTENANCE LETTER (OUTPATIENT)
Age: 52
End: 2022-04-30

## 2022-05-10 ENCOUNTER — HOSPITAL ENCOUNTER (EMERGENCY)
Facility: CLINIC | Age: 52
Discharge: HOME | End: 2022-05-11
Attending: EMERGENCY MEDICINE | Admitting: EMERGENCY MEDICINE

## 2022-05-10 ENCOUNTER — TELEPHONE (OUTPATIENT)
Dept: BEHAVIORAL HEALTH | Facility: CLINIC | Age: 52
End: 2022-05-10

## 2022-05-10 DIAGNOSIS — R45.851 SUICIDAL IDEATION: ICD-10-CM

## 2022-05-10 LAB
ALBUMIN SERPL-MCNC: 3.8 G/DL (ref 3.4–5)
ALP SERPL-CCNC: 87 U/L (ref 40–150)
ALT SERPL W P-5'-P-CCNC: 35 U/L (ref 0–70)
AMPHETAMINES UR QL SCN: NORMAL
ANION GAP SERPL CALCULATED.3IONS-SCNC: 6 MMOL/L (ref 3–14)
ANION GAP SERPL CALCULATED.3IONS-SCNC: 8 MMOL/L (ref 3–14)
APAP SERPL-MCNC: <2 MG/L (ref 10–30)
AST SERPL W P-5'-P-CCNC: 22 U/L (ref 0–45)
ATRIAL RATE - MUSE: 92 BPM
BARBITURATES UR QL: NORMAL
BENZODIAZ UR QL: NORMAL
BILIRUB SERPL-MCNC: 0.5 MG/DL (ref 0.2–1.3)
BUN SERPL-MCNC: 14 MG/DL (ref 7–30)
BUN SERPL-MCNC: 14 MG/DL (ref 7–30)
CALCIUM SERPL-MCNC: 9.8 MG/DL (ref 8.5–10.1)
CALCIUM SERPL-MCNC: 9.8 MG/DL (ref 8.5–10.1)
CANNABINOIDS UR QL SCN: NORMAL
CHLORIDE BLD-SCNC: 110 MMOL/L (ref 94–109)
CHLORIDE BLD-SCNC: 110 MMOL/L (ref 94–109)
CO2 SERPL-SCNC: 19 MMOL/L (ref 20–32)
CO2 SERPL-SCNC: 21 MMOL/L (ref 20–32)
COCAINE UR QL: NORMAL
CREAT SERPL-MCNC: 0.75 MG/DL (ref 0.66–1.25)
CREAT SERPL-MCNC: 0.77 MG/DL (ref 0.66–1.25)
DIASTOLIC BLOOD PRESSURE - MUSE: NORMAL MMHG
ETHANOL SERPL-MCNC: <0.01 G/DL
GFR SERPL CREATININE-BSD FRML MDRD: >90 ML/MIN/1.73M2
GFR SERPL CREATININE-BSD FRML MDRD: >90 ML/MIN/1.73M2
GLUCOSE BLD-MCNC: 118 MG/DL (ref 70–99)
GLUCOSE BLD-MCNC: 121 MG/DL (ref 70–99)
HOLD SPECIMEN: NORMAL
INTERPRETATION ECG - MUSE: NORMAL
OPIATES UR QL SCN: NORMAL
P AXIS - MUSE: 59 DEGREES
PCP UR QL SCN: NORMAL
POTASSIUM BLD-SCNC: 3.5 MMOL/L (ref 3.4–5.3)
POTASSIUM BLD-SCNC: 3.6 MMOL/L (ref 3.4–5.3)
PR INTERVAL - MUSE: 168 MS
PROT SERPL-MCNC: 7.6 G/DL (ref 6.8–8.8)
QRS DURATION - MUSE: 84 MS
QT - MUSE: 366 MS
QTC - MUSE: 452 MS
R AXIS - MUSE: -7 DEGREES
SALICYLATES SERPL-MCNC: 3 MG/DL
SARS-COV-2 RNA RESP QL NAA+PROBE: NEGATIVE
SODIUM SERPL-SCNC: 137 MMOL/L (ref 133–144)
SODIUM SERPL-SCNC: 137 MMOL/L (ref 133–144)
SYSTOLIC BLOOD PRESSURE - MUSE: NORMAL MMHG
T AXIS - MUSE: 77 DEGREES
VENTRICULAR RATE- MUSE: 92 BPM

## 2022-05-10 PROCEDURE — 93005 ELECTROCARDIOGRAM TRACING: CPT

## 2022-05-10 PROCEDURE — 99285 EMERGENCY DEPT VISIT HI MDM: CPT | Mod: 25

## 2022-05-10 PROCEDURE — 250N000013 HC RX MED GY IP 250 OP 250 PS 637: Performed by: EMERGENCY MEDICINE

## 2022-05-10 PROCEDURE — 250N000013 HC RX MED GY IP 250 OP 250 PS 637: Performed by: NURSE PRACTITIONER

## 2022-05-10 PROCEDURE — 82040 ASSAY OF SERUM ALBUMIN: CPT | Performed by: EMERGENCY MEDICINE

## 2022-05-10 PROCEDURE — 80143 DRUG ASSAY ACETAMINOPHEN: CPT | Performed by: EMERGENCY MEDICINE

## 2022-05-10 PROCEDURE — U0003 INFECTIOUS AGENT DETECTION BY NUCLEIC ACID (DNA OR RNA); SEVERE ACUTE RESPIRATORY SYNDROME CORONAVIRUS 2 (SARS-COV-2) (CORONAVIRUS DISEASE [COVID-19]), AMPLIFIED PROBE TECHNIQUE, MAKING USE OF HIGH THROUGHPUT TECHNOLOGIES AS DESCRIBED BY CMS-2020-01-R: HCPCS | Performed by: EMERGENCY MEDICINE

## 2022-05-10 PROCEDURE — C9803 HOPD COVID-19 SPEC COLLECT: HCPCS

## 2022-05-10 PROCEDURE — 80053 COMPREHEN METABOLIC PANEL: CPT | Performed by: EMERGENCY MEDICINE

## 2022-05-10 PROCEDURE — 90791 PSYCH DIAGNOSTIC EVALUATION: CPT

## 2022-05-10 PROCEDURE — 82077 ASSAY SPEC XCP UR&BREATH IA: CPT | Performed by: EMERGENCY MEDICINE

## 2022-05-10 PROCEDURE — 36415 COLL VENOUS BLD VENIPUNCTURE: CPT | Performed by: EMERGENCY MEDICINE

## 2022-05-10 PROCEDURE — 80307 DRUG TEST PRSMV CHEM ANLYZR: CPT | Performed by: EMERGENCY MEDICINE

## 2022-05-10 PROCEDURE — 80179 DRUG ASSAY SALICYLATE: CPT | Performed by: EMERGENCY MEDICINE

## 2022-05-10 RX ORDER — OLANZAPINE 10 MG/1
10 TABLET, ORALLY DISINTEGRATING ORAL ONCE
Status: COMPLETED | OUTPATIENT
Start: 2022-05-10 | End: 2022-05-10

## 2022-05-10 RX ORDER — LORAZEPAM 1 MG/1
1 TABLET ORAL ONCE
Status: COMPLETED | OUTPATIENT
Start: 2022-05-10 | End: 2022-05-10

## 2022-05-10 RX ORDER — NICOTINE 21 MG/24HR
1 PATCH, TRANSDERMAL 24 HOURS TRANSDERMAL DAILY
Status: DISCONTINUED | OUTPATIENT
Start: 2022-05-10 | End: 2022-05-11 | Stop reason: HOSPADM

## 2022-05-10 RX ORDER — OLANZAPINE 10 MG/2ML
10 INJECTION, POWDER, FOR SOLUTION INTRAMUSCULAR DAILY PRN
Status: DISCONTINUED | OUTPATIENT
Start: 2022-05-10 | End: 2022-05-11 | Stop reason: HOSPADM

## 2022-05-10 RX ORDER — OLANZAPINE 5 MG/1
10 TABLET ORAL ONCE
Status: DISCONTINUED | OUTPATIENT
Start: 2022-05-10 | End: 2022-05-10

## 2022-05-10 RX ORDER — LORAZEPAM 0.5 MG/1
1 TABLET ORAL ONCE
Status: COMPLETED | OUTPATIENT
Start: 2022-05-10 | End: 2022-05-10

## 2022-05-10 RX ORDER — OLANZAPINE 10 MG/1
10 TABLET, ORALLY DISINTEGRATING ORAL 2 TIMES DAILY PRN
Status: DISCONTINUED | OUTPATIENT
Start: 2022-05-10 | End: 2022-05-11 | Stop reason: HOSPADM

## 2022-05-10 RX ORDER — LORAZEPAM 1 MG/1
1 TABLET ORAL EVERY 4 HOURS PRN
Status: DISCONTINUED | OUTPATIENT
Start: 2022-05-10 | End: 2022-05-11 | Stop reason: HOSPADM

## 2022-05-10 RX ADMIN — NICOTINE 1 PATCH: 21 PATCH, EXTENDED RELEASE TRANSDERMAL at 16:56

## 2022-05-10 RX ADMIN — LORAZEPAM 1 MG: 1 TABLET ORAL at 21:06

## 2022-05-10 RX ADMIN — LORAZEPAM 1 MG: 1 TABLET ORAL at 15:51

## 2022-05-10 RX ADMIN — OLANZAPINE 10 MG: 10 TABLET, ORALLY DISINTEGRATING ORAL at 07:03

## 2022-05-10 RX ADMIN — LORAZEPAM 1 MG: 0.5 TABLET ORAL at 05:24

## 2022-05-10 RX ADMIN — NICOTINE POLACRILEX 2 MG: 2 GUM, CHEWING ORAL at 21:06

## 2022-05-10 NOTE — ED NOTES
"Entered patient room, [facility], for assessement. Patient standing at bedside pacing in room. States \"I want to go home. I want to end it all\". States he is feeling anxious. Offered patient to sit in behavioral common hallway. He accepted. Dr. Canales notified of patient's current state and anxiety. Medication ordered, patient willing to take.   "

## 2022-05-10 NOTE — ED PROVIDER NOTES
"  History   Chief Complaint:  Suicidal    The history is provided by the patient.      Christiano Garcia is a 52 year old male with history of CAD and myocardial infarction who presents with suicidal ideation and depression, worsening for the past 3 weeks. 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 Over the past few days, his ongoing depression and thoughts of self-harm worsened without notable exacerbating factors. Earlier this evening, the patient was with friends when \"I took a couple sleeping pills from my desi\" approximately 10 hours ago. He is unsure of what this medication was and notes only taking 2-3 tablets. With worsening mental health condition, he then took hold of his friend's pistol and held it against his head. He states that \"I was going to shoot myself\" until his friend witnessed his actions and took the gun from him, placing it out of sight. The patient was transported emergently to ED by his friend. Presently, he states that \"I just don't want to be here any more.\" He has engaged in self-harm in the past, specifically cutting his right wrist 2 years ago and overdosing on pills around that same time. Currently he reports having \"a bunch of pills in my bag\" but denies other ingestion or overdose. He has never sought medical help for psychiatric concerns in the past and has never taken medication for his depression. He does smoke cigarettes but denies use of alcohol or recreational drugs.    Review of Systems   All other systems reviewed and are negative.    Allergies:  No known drug allergies    Medications:  Fluticasone  Guaifenesin    Past Medical History:     CAD  Esophagitis  Acute intermittent porphyria  Kidney stones  PUD  Syncope  Sciatica  Lumbar disc herniation with radiculopathy  AIP  SIRS  Tobacco use  Myocardial infarction  Hematemesis  GI bleed    Past Surgical History:    EGD, combined, x2  Laparotomy " "exploratory  Discectomy  Appendectomy    Family History:    The patient is adopted.    Social History:  The patient presented with a friend.  The patient arrived in a private vehicle.    Physical Exam     Patient Vitals for the past 24 hrs:   BP Temp Temp src Resp SpO2 Height Weight   05/10/22 0303 (!) 135/94 -- -- -- -- 1.88 m (6' 2\") 97.1 kg (214 lb)   05/10/22 0302 -- 98.4  F (36.9  C) Oral 20 96 % -- --     Physical Exam  General: Resting on the bed.  Head: No obvious trauma to head.  Ears, Nose, Throat:  External ears normal.  Nose normal.    Eyes:  Conjunctivae clear.    CV: Regular rate and rhythm.  No murmurs.      Respiratory: Effort normal and breath sounds normal.  No wheezing or crackles.   Gastrointestinal: Soft.  No distension. There is no tenderness.  Neuro: Alert. Moving all extremities appropriately.  Normal speech.    Skin: Skin is warm and dry.  No rash noted.   Psych: Elusive.  Flat affect.  Cooperative but appears to be pacing.  Suicidal ideation present.  Minimal eye contact.      Emergency Department Course     ECG  ECG obtained at 0324, ECG read at 0328  Normal sinus rhythm.  Normal ECG.   No significant change as compared to prior, dated 1/17/19.  Rate 92 bpm. SC interval 168 ms. QRS duration 84 ms. QT/QTc 366/452 ms. P-R-T axes 59 -7 77.     Imaging:  No orders to display     Laboratory:  Labs Ordered and Resulted from Time of ED Arrival to Time of ED Departure   BASIC METABOLIC PANEL - Abnormal       Result Value    Sodium 137      Potassium 3.5      Chloride 110 (*)     Carbon Dioxide (CO2) 21      Anion Gap 6      Urea Nitrogen 14      Creatinine 0.75      Calcium 9.8      Glucose 121 (*)     GFR Estimate >90     ACETAMINOPHEN LEVEL - Abnormal    Acetaminophen <2 (*)    COVID-19 VIRUS (CORONAVIRUS) BY PCR - Normal    SARS CoV2 PCR Negative     ETHYL ALCOHOL LEVEL - Normal    Alcohol ethyl <0.01     SALICYLATE LEVEL - Normal    Salicylate 3       Emergency Department Course:    Mental " Health Risk Assessment      PSS-3    Date and Time Over the past 2 weeks have you felt down, depressed, or hopeless? Over the past 2 weeks have you had thoughts of killing yourself? Have you ever attempted to kill yourself? When did this last happen? User   05/10/22 0214 yes yes yes within the last 24 hours (including today) ADELAIDA      C-SSRS ([place])    Date and Time Q1 Wished to be Dead (Past Month) Q2 Suicidal Thoughts (Past Month) Q3 Suicidal Thought Method Q4 Suicidal Intent without Specific Plan Q5 Suicide Intent with Specific Plan Q6 Suicide Behavior (Lifetime) Within the Past 3 Months? RETIRED: Level of Risk per Screen Screening Not Complete User   05/10/22 0243 yes yes yes -- yes yes -- -- -- LS   05/10/22 0214 yes yes yes no yes yes -- -- -- ADELAIDA              Suicide assessment completed by mental health (D.E.C., LCSW, etc.)    Reviewed:  I reviewed nursing notes, vitals, past medical history and Care Everywhere.    Assessments:  242 I obtained history and examined the patient as noted above.   0503 I rechecked the patient.     Interventions:  Medications   LORazepam (ATIVAN) tablet 1 mg (1 mg Oral Given 5/10/22 0524)   OLANZapine zydis (zyPREXA) ODT tab 10 mg (10 mg Oral Given 5/10/22 0703)     Disposition:  Care of the patient was transferred to my colleague Dr. Shaver pending mental health assessment.     Impression & Plan     Medical Decision Makin-year-old male presents with suicidal ideation.  Vital signs are reassuring.  Broad differential was pursued include not limited to overdose, ingestion, alcohol, street drug, tobacco use, trauma, etc.  Patient has no signs of trauma.  Patient is elusive about what pills he is taken there for ingestion labs were ordered.  BMP without acute electrolyte, metabolic or renal dysfunction.  Tylenol level is negative.  Salicylate level negative.  Alcohol level negative.  COVID screen negative.  Urine drug screen obtained and pending.  EKG shows sinus rhythm, no  evidence of arrhythmia.  Normal intervals.  Patient reported taking a sleeping pill or 2 at 4 PM.  This was greater than 12 hours prior to presentation.  Patient is alert and oriented.  He does not appear to be intoxicated.  There is no signs of trauma on examination.  Patient was seen eval by DEC.  He appears to be imminent threat to self.  72-hour hold was placed.  He is very persistent that he wants to kill himself.  His friend had to remove a pistol from his hands.  He is very high risk and does require admission for mental health.  Patient signed out to my partner pending mental health placement.      Diagnosis:    ICD-10-CM    1. Suicidal ideation  R45.851        Discharge Medications:  New Prescriptions    No medications on file     Scribe Disclosure:  I, Bella Aguirre, am serving as a scribe at 2:24 AM on 5/10/2022 to document services personally performed by Rochelle Vaz MD based on my observations and the provider's statements to me.       Rochelle Vaz MD  05/10/22 0715

## 2022-05-10 NOTE — TELEPHONE ENCOUNTER
S: 8:33a Tabby w/ DEC called Intake w/ clinical on a 52/M present in the [facility] ER w/ SI.    B:  The pt is currently at the [facility] presenting w/ SI w/ a plan. Plan includes to shoot himself in the head w/ a gun. The pt has access to a gun. The pt held a gun to his head last night 5/9/2022 prior to being brought to the ER. The pt could not identify any stressors. Pt endorses not being able to eat or sleep for the last 5 days.     The pt denies using any substances.     The pt has been not IP for MH before. Pt self-reported IP MH, not through [facility].     The pts MH Hx is as follows: depression and bipolar pt self-reported.     The pt is not Rx MH medications    The pts does not have a therapist.     There is no concern for aggression this visit. There is no concern for HI.    Per  the pt can ambulate independently.     Per  the pt is indep with ADLs.    The pt does not have any known medical concerns.     Covid resulted as negative.  Utox needs to be collected.    Additional labs competed: acetaminophen, silicate, ethyl alcohol, BMP. See Epic.     A: CIRO- 72 hr hold.     R: The pt is currently in the [facility] ER awaiting bed placement.    8:38a The pt has been added to the work-list. Intake working on identifying appropriate bed placement.     Intake Morning Bed Search (8:40a)  [facility] is posting 0 beds.   [facility] is posting 0 beds.  [facility] is posting 0 beds.  [facility] is posting 0 beds.  [facility] is posting 0 beds.  [facility] is posting 0 beds.  [facility] is posting 0 beds.  [facility] is posting 0 beds.  [facility] is posting 0 beds. Mixed unit 12+. Low acuity only.   [facility] is positing 0 beds. No aggression.   [facility] is posting 0 beds.   [facility] is posting 0 beds. Low acuity only.  [facility] is posting 0 beds.  [facility] is posting 0 beds. Low acuity.   [facility] is posting 1 bed. Low acuity only.  72 hr hold required. Pt does not fit open bed available.     [facility] is posting 0 beds.   [facility] is posting 0 beds. Vol only, No Hx of aggression, violence or assault. No sexual offenders. No 72 hr holds.  [facility] is posting 5 beds. (Must have the cognitive ability to do programming. No aggressive or violent behavior or recent HX in the last 2 yrs. MH must be primary.)  [facility] is posting 0 beds. Low acuity only. Violence and aggression capped.   [facility] is posting 0 beds. Low acuity, Neg Covid.   [facility] is posting 0 beds. Covid neg. Vol only. Combined adolescent and adult unit. No aggressive or violent behavior. No registered sex offenders.   [facility] is posting 4 beds. Call for details.  [facility] is posting 0 beds.     8:52a Intake called [facility])- facility can review. Intake faxed clinical for review (f) [phone]. Intake awaiting response.      10:51a Intake received call from Dil[facility] Malagon)- the pt is declined due to pt and facility acuity. The pt remains on the waitlist. Intake continues to identify appropriate bed placement.    Intake Afternoon Bed Search (Done at 3:00p):  [facility] is posting 0 beds.   [facility] is posting 0 beds.  [facility] is posting 0 beds.  [facility] is posting 0 beds.  [facility] is posting 0 beds.  [facility] is posting 0 beds.  [facility] is posting 0 beds.  [facility] is posting 0 beds.  [facility] is posting 0 beds. Mixed unit 12+. Low acuity only.   [facility] is positing 0 beds. No aggression.   [facility] is posting 0 beds.   [facility] is posting 0 beds. Low acuity only.  [facility] is posting 0 beds.  [facility] is posting 0 beds. Low acuity.   [facility] is posting 1 bed. Low acuity only. 72 hr hold required. Pt does not fit open bed available.      [facility] is [facility].   [facility] is posting 0 beds. Vol only, No Hx of aggression, violence or assault. No sexual offenders. No 72 hr holds.  [facility] is posting 5 beds. (Must have the cognitive ability to do programming. No aggressive or violent behavior or recent HX in the last 2 yrs. MH must be primary.) Pt was declined today 5/10/2022.   [facility] is posting 0 beds. Low acuity only. Violence and aggression capped.   [facility] is posting 0 beds. Low acuity, Neg Covid.   [facility] is posting 0 beds. Covid neg. Vol only. Combined adolescent and adult unit. No aggressive or violent behavior. No registered sex offenders.   [facility] is posting 4 beds. Call for details.  [facility] is posting 0 beds.    3:04p Intake called [facility] facility can review. Intake faxed clinical for review. Intake awaiting review.

## 2022-05-10 NOTE — ED NOTES
"[facility] Note:      Attempted to obtain collateral information from patient's Significant Other/Emergency Contact Afua Christensen ([phone]) but this phone call was not answered. Writer left a voicemail without PHI requesting a return call.       Collateral Information  The following information was received from Rowdy whose relationship to the patient is Friend. Information was obtained via phone. Their phone number is [phone] and they last had contact with patient on 5/10/2022.    What happened today: Rowdy reported that last night patient called him while he was working. 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 Patient sent messages stating \"dude you need to call me.\" About a half hour later patient sent a message stating \"nevermind I called a crisis team and they're supposed to be on their way.\" Rowdy said that he and the other group members on the live stream chat knew this means the patient was suicidal. Christiano and his work partner drove to patient's home while keeping the patient engaged on the chat. Another person convinced patient to drop his gun off at the neighbor's house. Patient was willing to meet Rowdy at the [facility] across the field from his home. Rowdy left his headlights on because it was dark out and gave the patient a big hug, while checking to see if he had guns on him. Rowdy states he told the patient that he was glad he reached out and that he would bring the patient someplace where he can get help. Patient did show Rowdy pictures on his cell phone of him holding a gun to his head. Rowdy also noted that he was very concerned for his own safety during this encounter with the patient and had \"accepted that I could get die in the middle of this.\"     What is different about patient's functioning: Rowdy has noticed the patient is \"nothing like the man he was before. When he called me for help it sounded like it was " "someone who was crushed and broken and like his mind was falling apart at the seams.\" Rowdy reports the patient seems as though he's been depressed for at least 2 weeks. Rowdy believes the patient is feeling abandoned and lost. Rowdy does not know for sure but thinks patient's wife may have left him.     Concern about alcohol/drug use: Yes, patient told Rowdy last night that he had sleeping pills on him, however stated that he had not slept in 5-7 days because of nightmares. Rowdy reports the patient is likely having flashbacks from PTSD. No concerns with alcohol or other drug abuse.     What do you think the patient needs: \"A friend and a hand.\" Rowdy does not believe the patient is safe for discharge at this time.     Has patient made comments about wanting to kill themselves/others:  Yes.     If d/c is recommended, can they take part in safety/aftercare planning: Yes, Rowdy is willing to provide as much support as he can, however he is limited by his schedule and his family's needs.     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       SUMI Martell on 5/10/2022 at 6:19 AM  "

## 2022-05-10 NOTE — ED NOTES
ED pharmacist here to place patient's home medication of Norco in secured bag and took medication to main pharmacy. Sealed bag receipt number [id number] placed in  3 locker on inside of door. Medication to transport with patient to inpatient bed.

## 2022-05-10 NOTE — ED NOTES
"Reports feeling hopeless and helpless. States he as been isolating himself for the past 1.5 months progressively worsening over the last 3 weeks. States friends \"tricked me\" into coming here last night. States he does not want anyone to know he is here due to not wanting to burden them. Reports having 2 children, 2 sisters and a mother as support system. His fiance has been attempting to call nursing station, patient does not want anyone to have information nor to know he is here. Enjoys [employer] and shooting [place]. 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 States he has a joshua background.   "

## 2022-05-10 NOTE — ED PROVIDER NOTES
Sign Out Note    I took over care of this patient from Dr. Vaz    Briefly, patient presented to the ED for: suicide ideation, pistol to head    Plan at time of sign out: awaiting psych inpatient bed    Events during my shift: no acute events, signed out to Dr. Canales at 1500 shift change    MD Sivakumar Farrar, Demetrio Angeles MD  05/10/22 0323

## 2022-05-10 NOTE — ED TRIAGE NOTES
PT NEEDS PHARMACY PERMANENTLY UPDATED TO [facility] AT [facility] IN [place] IN AND IS REQUESTING REFILL BE SENT THERE. THEIR PHONE NUMBER IS  [phone]. PT IS REQUESTING A CALL BACK LETTING HIM KNOW THAT THE REFILL HAS BEEN SENT. PLEASE ADVISE PT       CALL BACK- [phone]   Pt brought in by friends. Friends witnessed pt have pistol against temple. Pt states he gave firearm to neighbor. Pt also states he would overdose on sleeping pills. Pt has had suicidal attempts.      Triage Assessment     Row Name 05/10/22 0212       Triage Assessment (Adult)    Airway WDL WDL       Respiratory WDL    Respiratory WDL WDL       Skin Circulation/Temperature WDL    Skin Circulation/Temperature WDL WDL       Cardiac WDL    Cardiac WDL WDL       Peripheral/Neurovascular WDL    Peripheral Neurovascular WDL WDL       Cognitive/Neuro/Behavioral WDL    Cognitive/Neuro/Behavioral WDL WDL

## 2022-05-10 NOTE — ED NOTES
Bed: ED19  Expected date:   Expected time:   Means of arrival:   Comments:  Room 20 moving here 1:1

## 2022-05-10 NOTE — ED NOTES
Called [facility] for update. Spoke with Afua. States patient is being reviewed by [facility] in [place]. Patient declined by [facility]. Patient updated on this.
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
## 2022-05-11 ENCOUNTER — TELEPHONE (OUTPATIENT)
Dept: BEHAVIORAL HEALTH | Facility: CLINIC | Age: 52
End: 2022-05-11

## 2022-05-11 ENCOUNTER — MEDICAL CORRESPONDENCE (OUTPATIENT)
Dept: HEALTH INFORMATION MANAGEMENT | Facility: CLINIC | Age: 52
End: 2022-05-11

## 2022-05-11 VITALS
TEMPERATURE: 97.7 F | SYSTOLIC BLOOD PRESSURE: 144 MMHG | HEIGHT: 74 IN | WEIGHT: 214 LBS | BODY MASS INDEX: 27.46 KG/M2 | HEART RATE: 103 BPM | RESPIRATION RATE: 18 BRPM | OXYGEN SATURATION: 96 % | DIASTOLIC BLOOD PRESSURE: 75 MMHG

## 2022-05-11 PROCEDURE — 250N000013 HC RX MED GY IP 250 OP 250 PS 637: Performed by: NURSE PRACTITIONER

## 2022-05-11 PROCEDURE — 250N000013 HC RX MED GY IP 250 OP 250 PS 637: Performed by: EMERGENCY MEDICINE

## 2022-05-11 RX ADMIN — LORAZEPAM 1 MG: 1 TABLET ORAL at 07:31

## 2022-05-11 RX ADMIN — NICOTINE 1 PATCH: 21 PATCH, EXTENDED RELEASE TRANSDERMAL at 07:33

## 2022-05-11 RX ADMIN — NICOTINE POLACRILEX 2 MG: 2 GUM, CHEWING ORAL at 08:01
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
## 2022-05-11 NOTE — ED NOTES
[facility] Note:      Collateral Information  Phone call received from patient's significant other Afua Christensen ([phone]) as a result of writer reaching out for collateral upon patient's admission.     Afua was informed that the hospital is unable to confirm or deny the patient's presence and that we cannot provide any information on him. No information regarding the patient was provided to Afua.     Afua reported that yesterday her landlord and two police officers came to her home and made her and her two children leave immediately. Afua is now at her parents home and says she had not seen them for 9 years before this. Afua says she has no vehicle or money and was not allowed to get any of her belongings before leaving her home. Afua stated that Christiano is in charge of their housing and communicating with the landlord and she does not know what is happening or what to do now. Writer confirmed with Afua that her and her children are safe at this time and she has support people to reach out to for help.     Writer answered general questions from Afua regarding hospitalizations, the 72-hour hold policy, and privacy information.       SUMI Martell on 5/11/2022 at 7:08 AM

## 2022-05-11 NOTE — TELEPHONE ENCOUNTER
R: [place] Benjamin / Dr. Mccall   00:31 - Alis from [facility] called to report that pt was accepted by [facility]. Report at anytime to [phone]. 00:39 - Notified [facility]

## 2022-05-11 NOTE — PHARMACY-ADMISSION MEDICATION HISTORY
Pharmacy Medication History  Admission medication history interview status for the 5/10/2022  admission is complete. See EPIC admission navigator for prior to admission medications     Location of Interview: Outside patient room but on unit  Medication history sources: Chart review and Surescripts    Additional medication history information:   Patient takes no medications on a regular basis.     Medication reconciliation completed by provider prior to medication history? No    Time spent in this activity: 10 min     Prior to Admission medications    Medication Sig Last Dose Taking? Auth Provider       The information provided in this note is only as accurate as the sources available at the time of update(s)

## 2022-05-11 NOTE — ED PROVIDER NOTES
Assumed care at change of shift.  This patient was awaiting inpatient mental health placement.  On recheck and assessments the [facility]path staff believe the patient could be safely assessed in the [facility] unit for further care.  He was transferred there.     Art Canales MD  05/10/22 1168

## 2022-05-11 NOTE — ED PROVIDER NOTES
I was asked by Tabby to sign an EMTALA transfer just now.  The patient does seem to be high risk for suicidal ideation, and there are no physicians in the [facility]path unit right now.  I did not participate in the care of this patient, but I have reviewed his chart and familiarize myself with his history.  He is on a 72-hour hold, and per the chart seem to be a significant threat to himself and devaluing provider noted that he required admission for his high risk to attempting to take his own life.  I do think it is in his best interest to sign this paperwork, and have done so.  I have made it clear though that he first should know that he is going to [facility] as it is out of [place].     Ben Fitzpatrick MD  05/11/22 0117

## 2022-05-12 ENCOUNTER — TRANSFERRED RECORDS (OUTPATIENT)
Dept: HEALTH INFORMATION MANAGEMENT | Facility: CLINIC | Age: 52
End: 2022-05-12

## 2022-05-16 ENCOUNTER — TRANSFERRED RECORDS (OUTPATIENT)
Dept: HEALTH INFORMATION MANAGEMENT | Facility: CLINIC | Age: 52
End: 2022-05-16

## 2022-05-16 LAB
CHOLESTEROL (EXTERNAL): 238 MG/DL (ref 100–199)
GLUCOSE (EXTERNAL): 90 MG/DL (ref 65–99)
HDLC SERPL-MCNC: 38 MG/DL
LDL CHOLESTEROL (EXTERNAL): 126 MG/DL (ref 0–99)
TRIGLYCERIDES (EXTERNAL): 414 MG/DL (ref 0–149)

## 2022-05-17 ENCOUNTER — TRANSFERRED RECORDS (OUTPATIENT)
Dept: HEALTH INFORMATION MANAGEMENT | Facility: CLINIC | Age: 52
End: 2022-05-17

## 2022-05-17 LAB
CHOLESTEROL (EXTERNAL): 238 MG/DL (ref 100–199)
GLUCOSE (EXTERNAL): 90 MG/DL (ref 65–99)
HDLC SERPL-MCNC: 38 MG/DL
LDL CHOLESTEROL CALCULATED (EXTERNAL): 126 MG/DL (ref 0–99)
TRIGLYCERIDES (EXTERNAL): 414 MG/DL (ref 0–149)

## 2022-05-18 ENCOUNTER — TRANSFERRED RECORDS (OUTPATIENT)
Dept: HEALTH INFORMATION MANAGEMENT | Facility: CLINIC | Age: 52
End: 2022-05-18

## 2022-05-18 ENCOUNTER — E-VISIT (OUTPATIENT)
Dept: FAMILY MEDICINE | Facility: CLINIC | Age: 52
End: 2022-05-18

## 2022-05-18 DIAGNOSIS — J03.90 EXUDATIVE TONSILLITIS: Primary | ICD-10-CM

## 2022-05-18 PROCEDURE — 99421 OL DIG E/M SVC 5-10 MIN: CPT | Performed by: PHYSICIAN ASSISTANT

## 2022-05-19 NOTE — PATIENT INSTRUCTIONS
Thank you for choosing us for your care. Given your symptoms, I would like you to do a lab-only visit to determine what is causing them.  I have placed the orders.  Please [place] or call [phone].  I will let you know when the results are back and next steps to take.

## 2022-11-16 ENCOUNTER — TRANSFERRED RECORDS (OUTPATIENT)
Dept: HEALTH INFORMATION MANAGEMENT | Facility: CLINIC | Age: 52
End: 2022-11-16

## 2022-11-20 ENCOUNTER — HEALTH MAINTENANCE LETTER (OUTPATIENT)
Age: 52
End: 2022-11-20

## 2022-12-09 ENCOUNTER — TELEPHONE (OUTPATIENT)
Dept: PULMONOLOGY | Facility: CLINIC | Age: 52
End: 2022-12-09

## 2022-12-09 NOTE — TELEPHONE ENCOUNTER
Called to schedule via referral no answer lvm to contact me [phone] or call center [phone] to schedule new pt visit fpft and cxr next available.

## 2022-12-12 DIAGNOSIS — R04.2 HEMOPTYSIS: Primary | ICD-10-CM

## 2022-12-12 NOTE — TELEPHONE ENCOUNTER
RECORDS RECEIVED FROM: internal /ce   DATE RECEIVED: 1.26.23    NOTES STATUS DETAILS   OFFICE NOTE from referring provider internal  Self referred    OFFICE NOTE from other specialist ce [facility]- 11.16.22 jernigan     DISCHARGE SUMMARY from hospital     DISCHARGE REPORT from the ER     MEDICATION LIST internal     IMAGING  (NEED IMAGES AND REPORTS)     CT SCAN ce [facility]- 9.21.22, 8/22/22   CHEST XRAY (CXR) [facility]- 11.16.22, 9/14/22    TESTS     PULMONARY FUNCTION TESTING (PFT) internal  Scheduled 1.26.23       Action [id number]    Action Taken Image request sent to [facility] for   CXR-11.16.22, 9/14/22   CT- 9.21.22, 8/22/22  ---received images--

## 2022-12-13 ENCOUNTER — TELEPHONE (OUTPATIENT)
Dept: PULMONOLOGY | Facility: CLINIC | Age: 52
End: 2022-12-13

## 2023-01-16 ENCOUNTER — TELEPHONE (OUTPATIENT)
Dept: PULMONOLOGY | Facility: CLINIC | Age: 53
End: 2023-01-16

## 2023-01-16 NOTE — TELEPHONE ENCOUNTER
Called pt to offer to move up appointment 1/26 to 1/18 no answer [facility] [phone]

## 2023-01-18 ENCOUNTER — OFFICE VISIT (OUTPATIENT)
Dept: PULMONOLOGY | Facility: CLINIC | Age: 53
End: 2023-01-18
Attending: INTERNAL MEDICINE
Payer: COMMERCIAL

## 2023-01-18 VITALS — SYSTOLIC BLOOD PRESSURE: 113 MMHG | OXYGEN SATURATION: 94 % | HEART RATE: 99 BPM | DIASTOLIC BLOOD PRESSURE: 78 MMHG

## 2023-01-18 DIAGNOSIS — R04.2 HEMOPTYSIS: Primary | ICD-10-CM

## 2023-01-18 PROCEDURE — 99204 OFFICE O/P NEW MOD 45 MIN: CPT | Mod: GC | Performed by: STUDENT IN AN ORGANIZED HEALTH CARE EDUCATION/TRAINING PROGRAM

## 2023-01-18 PROCEDURE — G0463 HOSPITAL OUTPT CLINIC VISIT: HCPCS | Performed by: STUDENT IN AN ORGANIZED HEALTH CARE EDUCATION/TRAINING PROGRAM

## 2023-01-18 RX ORDER — ZOLPIDEM TARTRATE 10 MG/1
TABLET ORAL
COMMUNITY
Start: 2022-10-28

## 2023-01-18 RX ORDER — BUPROPION HYDROCHLORIDE 150 MG/1
150 TABLET, EXTENDED RELEASE ORAL 2 TIMES DAILY
COMMUNITY
Start: 2023-01-03

## 2023-01-18 RX ORDER — UMECLIDINIUM BROMIDE AND VILANTEROL TRIFENATATE 62.5; 25 UG/1; UG/1
POWDER RESPIRATORY (INHALATION)
COMMUNITY
Start: 2023-01-16

## 2023-01-18 RX ORDER — ALBUTEROL SULFATE 90 UG/1
2 AEROSOL, METERED RESPIRATORY (INHALATION) EVERY 4 HOURS PRN
COMMUNITY
Start: 2022-09-19

## 2023-01-18 RX ORDER — ACETAMINOPHEN AND CODEINE PHOSPHATE 300; 30 MG/1; MG/1
TABLET ORAL
COMMUNITY
Start: 2022-11-30 | End: 2024-08-30

## 2023-01-18 RX ORDER — LANSOPRAZOLE 30 MG/1
30 CAPSULE, DELAYED RELEASE ORAL
Status: ON HOLD | COMMUNITY
End: 2023-07-28

## 2023-01-18 RX ORDER — NICOTINE 21 MG/24HR
PATCH, TRANSDERMAL 24 HOURS TRANSDERMAL
COMMUNITY
Start: 2022-12-08

## 2023-01-18 RX ORDER — SULINDAC 150 MG/1
150 TABLET ORAL 2 TIMES DAILY PRN
COMMUNITY

## 2023-01-18 RX ORDER — LITHIUM CARBONATE 450 MG
TABLET, EXTENDED RELEASE ORAL
COMMUNITY
Start: 2023-01-03

## 2023-01-18 RX ORDER — BISACODYL 5 MG
TABLET, DELAYED RELEASE (ENTERIC COATED) ORAL
Status: ON HOLD | COMMUNITY
Start: 2022-12-23 | End: 2023-07-28

## 2023-01-18 RX ORDER — QUETIAPINE FUMARATE 100 MG/1
100 TABLET, FILM COATED ORAL AT BEDTIME
COMMUNITY
Start: 2022-11-04

## 2023-01-18 RX ORDER — CHLORHEXIDINE GLUCONATE ORAL RINSE 1.2 MG/ML
SOLUTION DENTAL
COMMUNITY
Start: 2022-10-14 | End: 2024-08-30

## 2023-01-18 RX ORDER — CLONAZEPAM 0.5 MG/1
TABLET ORAL
COMMUNITY
Start: 2023-01-03 | End: 2024-08-30

## 2023-01-18 RX ORDER — QUETIAPINE FUMARATE 25 MG/1
TABLET, FILM COATED ORAL
COMMUNITY
Start: 2022-12-29 | End: 2024-08-30

## 2023-01-18 NOTE — NURSING NOTE
Reviewed bronchoscopy prep sheet with patient. He is not diabetic, is not taking blood thinners, and is not taking any BP medications. Patient will hold medications until after the procedure. He is aware he needs a  and will withhold food and liquid according to [facility] guidelines. He will be contacted with a time and date as soon as the anesthesia, CT and bronch can be coordinated. Patient verbalized understanding and all questions were answered.

## 2023-01-18 NOTE — NURSING NOTE
Chief Complaint   Patient presents with     New Patient     New Pt      Vitals were taken and medications were reconciled.     Serena Willis RMA  1:24 PM

## 2023-01-18 NOTE — PATIENT INSTRUCTIONS
We will work on getting your CT scan and bronchoscopy scheduled, as well as pulmonary function testing.    Come back to see me in 6 weeks, we will be in contact sooner.    If you develop bleeding from your lungs again, consider coming to the [facility] emergency room to facilitate the above workup.

## 2023-01-18 NOTE — LETTER
1/18/2023         RE: Christiano Garcia  [address] [address]        Dear Colleague,    Thank you for referring your patient, Christiano Garcia, to the [facility] [place]. Please see a copy of my visit note below.    [facility]    Name: Christiano Garcia MRN: [id number]     Age: 52 year old   YOB: 1970     Reason for Visit: hemoptysis           HPI:   Christiano Garcia is a 52 year old male with history of  ***    10 point ROS performed and negative except for as noted in HPI.         Social History:   Tobacco:  EtOH:  Drug:  Occupation:  Exposures:  Travel:  Contacts:         Past Medical History:     Past Medical History:   Diagnosis Date     CAD (coronary artery disease)     s/p MI     Esophagitis      h/o Acute intermittent porphyria     per patient, doubt raised by GI during 5/2013 admission     Kidney stones      PUD (peptic ulcer disease)            Past Surgical History:     Past Surgical History:   Procedure Laterality Date     ESOPHAGOSCOPY, GASTROSCOPY, DUODENOSCOPY (EGD), COMBINED  2/21/2013    Procedure: COMBINED ESOPHAGOSCOPY, GASTROSCOPY, DUODENOSCOPY (EGD), BIOPSY SINGLE OR MULTIPLE;  EGD with cold biopsy for h pylori;  Surgeon: Jen Galo MD;  Location:  GI     ESOPHAGOSCOPY, GASTROSCOPY, DUODENOSCOPY (EGD), COMBINED  3/8/2013    Procedure: COMBINED ESOPHAGOSCOPY, GASTROSCOPY, DUODENOSCOPY (EGD);  Esophagoscopy,Gastroscopy,Duodenoscopy- Room 504;  Surgeon: Jimenez King DO;  Location:  GI     LAPAROTOMY EXPLORATORY  1995           Family History:   No family history on file.          Allergies:   No Known Allergies          Medications:   No current outpatient medications on file prior to visit.  No current facility-administered medications on file prior to visit.         Exam:   There were no vitals taken for this visit.    Gen: sitting upright, in no distress  HEENT: atraumatic,  EOMI  Oropharynx:  CV: RRR, no peripheral edema noted  Resp: no increased WOB, ***  Abd: not distended, non-tender  Skin: no rashes or lesions on exposed skin  Extremities: moving all extremities, no gross deformities  Neuro: alert and oriented no FND         Data:   Labs:    1/10/2023:  INR 1.0, PTT 30.5  D-dimer 0.6      Bronchoscopy at outside hospital:  Narrative: Videobronchoscopy was performed in the Respiratory Therapy Department.   After informed consent, the patient was administered moderate conscious sedation with iv Fentanyl and Versed and placed in a supine position.   An oral bite block was inserted and the patients eyes and upper face were protected with a clean towel.   The tip of the diagnostic bronchoscope was then inserted into the oral cavity.   The oropharynx, epiglottis, and vocal cords were visualized.  The vocal cords appeared to move normally with phonation.   The tip of the scope was then advanced into the trachea which was examined from the cricoid cartilage to the main kristen.  The main kristen appeared sharp.   A complete examination of both bronchial trees was performed.   The endobronchial anatomy was normal with no endobronchial lesions and no bloody secretions.   Prominent submucosal vessels visualized (as seen in the photos below) as a potential source of bleeding (although not active at the time of the procedure).   Following the airway inspection, the scope was removed from the airway and the procedure was concluded.   The procedure was tolerated well.  In anticipation of the pt's concern for heightened anxiety and potential risk of being   combative, he was given 8 mg instead of 4 mg of iv Versed and 100 mcg iv of Fentanyl -- there were no problems with completing the procedure and, in fact, the pt was extremely slow to wake up from sedation.  COMPLICATIONS:  None   SPECIMENS: None     IMAGING:  CT pulmonary angiogram 1/10/2023:  FINDINGS: No evidence of pulmonary embolus. The  heart is normal in size. No pericardial or pleural effusions. No enlarged axillary lymph nodes. Calcified right hilar lymph nodes. Prominent bilateral hilar lymph nodes are grossly stable. Groundglass opacity in the posterior lower lobes most likely represents atelectasis. A couple calcified nodules are present in the right lung as seen previously.     IMPRESSION: Negative for pulmonary embolism. Minimal atelectasis in the posterior lower lobes as seen previously. No acute findings.     CT pulmonary angiogram 9/21/2022:  FINDINGS: Examination slightly degraded by motion artifact.   The pulmonary arteries are well-opacified and normal in appearance.  No filling defect seen in any of the pulmonary arteries.     The lungs remain largely clear aside from opacities suggestive of minor pulmonary scar or atelectasis, most conspicuous dependently in the lower lobes, and inferiorly in the lingula. No pulmonary lesion at suspicion of malignancy. Small calcified granulomas in the lateral aspect of the minor fissure. Small fissural lymph nodes.     The thoracic aorta is not significantly dilated.  No dissection.     No intrathoracic or axillary adenopathy. Calcified nodes at the right pulmonary hilum.   No pericardial abnormality.     No pleural effusion or pneumothorax.   No fracture or destructive bone lesion.   A few calcified granulomas are present within normal sized spleen.     IMPRESSION: Negative for pulmonary thromboembolism.   No finding felt likely to account for hemoptysis. No pulmonary lesion concerning for malignancy. Opacities in both lungs suggestive of minor atelectasis or pulmonary scar, most conspicuous dependently in the lower lobes and inferiorly in the lingula.   No pleural, pericardial, or thoracic aortic abnormality.   If hemoptysis persists, further evaluation with bronchoscopy would be advised.     CT chest non-con 8/22/2022 IMPRESSION:  1.  Old granulomatous change evident. Calcified right hilar lymph  nodes and small cluster of calcified granulomas seen within the lateral aspect of the right upper lobe.   2.  Additionally there are some small noncalcified nodules along the minor fissure measuring up to about 9 mm in size. Appearance is most suggestive of intrafissural lymph nodes. Recommend follow-up CT at 6-12 months.   3.  Bilateral hazy mosaic attenuation, probably reflective of some air trapping. Small subpleural cysts also noted posteriorly.   4.  Bilateral areas of atelectasis    PFT:    ECHO:  TTE 8/2015 CONCLUSION:    Normal LV size, wall thickness, and systolic function.      Normal RV size and function.      No significant valvular abnormality    IVC is normal in size and responsive to inspiration indicating normal      RA pressure.        Left Ventricular Ejection Fraction: 60 %              Assessment and Recommendations:             Patient staffed with  ***. Return to clinic in ***      Radha Meyer DO  Pulmonary and Critical Care Fellow        Again, thank you for allowing me to participate in the care of your patient.        Sincerely,        Radha Meyer MD

## 2023-01-18 NOTE — PROGRESS NOTES
[facility]    Name: Christiano Garcia MRN: [id number]     Age: 52 year old   YOB: 1970     Reason for Visit: hemoptysis           HPI:   Christiano Garcia is a 52 year old male with history of  CAD, esophagitis/PUD, mental health disorder who presents for evaluation of hemoptysis.     This started last July, while admitted at [facility] for psychiatric reasons. He had a CT scan at the time, which revealed atelectasis. It was recommended he follow up with his PCP on return to [place]. Since this time, the blood has continued. He coughs up blood about 3-4 times per week. This morning, he coughed up blood, for the second time this week. The blood is bright with clots in it. It feels like a cough, he has no sensation of emesis. No clear triggers or exacerbating factors. Does use anoro ellipta which seems to help with coughing. No nosebleeds. Some blood per rectum, but otherwise no bleeding issues.     No recent illness, no respiratory infections though has been treated with frequent antibiotics since hemopytsis started. No frequent pneumonias or pulmonary problems as a child.    He has also noticed shortness of breath recently. Feels winded with even minimal exertion. ? If contributions from recent weight gain due to medications and less physical activity due to hospitalization contributing.    In regards to tobacco use history, he did quit while inpatient at [place] using patch and inhalers, but as soon as he got to current [facility] facility started smoking again. Some part in adjustment to situation, some part as he no longer had access to nicotine inhaler. Is interested in quitting still.    10 point ROS performed and negative except for as noted in HPI.         Social History:   Tobacco: current tobacco use, 36 years x 1ppd.   Drug: no vaping.  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   Exposures: gas chamber during training, but aside from that no  exposures to chemicals/burn pits/substances.  Travel: no other travel out of the country aside from [place] and [place]. Basic training was in [place].  Contacts: daughter recently had RSV.         Past Medical History:     Past Medical History:   Diagnosis Date     CAD (coronary artery disease)     s/p MI     Esophagitis      h/o Acute intermittent porphyria     per patient, doubt raised by GI during 5/2013 admission     Kidney stones      PUD (peptic ulcer disease)            Past Surgical History:   No chest surgeries. Ex lap in 1993 (95 in chart). EGDs.          Family History:   Adopted, doesn't know birth parents' history. Daughter recently admitted to [facility] with subcutaneous emphysema.          Allergies:   No Known Allergies          Medications:   ACETAMINOPHEN-CODEINE 300-30 MG per tablet,   albuterol (PROAIR HFA/PROVENTIL HFA/VENTOLIN HFA) 108 (90 Base) MCG/ACT inhaler, Inhale 2 puffs into the lungs  ANORO ELLIPTA 62.5-25 MCG/ACT oral inhaler,   bisacodyl (DULCOLAX) 5 MG EC tablet,   buPROPion (WELLBUTRIN SR) 150 MG 12 hr tablet,   chlorhexidine (PERIDEX) 0.12 % solution,   clonazePAM (KLONOPIN) 0.5 MG tablet,   LANsoprazole (PREVACID) 30 MG DR capsule, Take 30 mg by mouth  lithium (ESKALITH CR/LITHOBID) 450 MG CR tablet,   nicotine (NICODERM CQ) 21 MG/24HR 24 hr patch,   QUEtiapine (SEROQUEL) 200 MG tablet,   QUEtiapine (SEROQUEL) 25 MG tablet,   sulindac (CLINORIL) 150 MG tablet, Take 150 mg by mouth  zolpidem (AMBIEN) 10 MG tablet,     No current facility-administered medications on file prior to visit.         Exam:   /78 (BP Location: Right arm, Patient Position: Chair, Cuff Size: Adult Large)   Pulse 99   SpO2 94%     Gen: sitting upright, in no distress  HEENT: atraumatic, EOMI  Oropharynx: dry mucosa, no lesions  CV: RRR, no peripheral edema noted  Resp: no increased WOB, upper airway wheeze, no wheezing throughout otherwise, no crackles.  Skin: no rashes or lesions on exposed  skin  Extremities: moving all extremities, no gross deformities  Neuro: alert and oriented no FND         Data:   Labs:  1/10/2023:  INR 1.0, PTT 30.5  D-dimer 0.6  Hgb 13.7, plts 320, WBC 10. BMP wnl. LFTs wnl.    11/16: ANCA neg, CHARLIE neg, RA profile neg, Scl-70 neg, SLEP profile neg, ESR wnl,     9/28/22 lower respiratory culture Candida albicans. Occasional GPC. PTT/INR/plts wnl.    Bronchoscopy 9/28/2022 at outside hospital:  Narrative: Videobronchoscopy was performed in the Respiratory Therapy Department.   After informed consent, the patient was administered moderate conscious sedation with iv Fentanyl and Versed and placed in a supine position.   An oral bite block was inserted and the patients eyes and upper face were protected with a clean towel.   The tip of the diagnostic bronchoscope was then inserted into the oral cavity.   The oropharynx, epiglottis, and vocal cords were visualized.  The vocal cords appeared to move normally with phonation.   The tip of the scope was then advanced into the trachea which was examined from the cricoid cartilage to the main kristen.  The main kristen appeared sharp.   A complete examination of both bronchial trees was performed.   The endobronchial anatomy was normal with no endobronchial lesions and no bloody secretions.   Prominent submucosal vessels visualized (as seen in the photos below) as a potential source of bleeding (although not active at the time of the procedure).   Following the airway inspection, the scope was removed from the airway and the procedure was concluded.   The procedure was tolerated well.  In anticipation of the pt's concern for heightened anxiety and potential risk of being   combative, he was given 8 mg instead of 4 mg of iv Versed and 100 mcg iv of Fentanyl -- there were no problems with completing the procedure and, in fact, the pt was extremely slow to wake up from sedation.  COMPLICATIONS:  None   SPECIMENS: None     IMAGING:  CT pulmonary  angiogram 1/10/2023:  FINDINGS: No evidence of pulmonary embolus. The heart is normal in size. No pericardial or pleural effusions. No enlarged axillary lymph nodes. Calcified right hilar lymph nodes. Prominent bilateral hilar lymph nodes are grossly stable. Groundglass opacity in the posterior lower lobes most likely represents atelectasis. A couple calcified nodules are present in the right lung as seen previously.   IMPRESSION: Negative for pulmonary embolism. Minimal atelectasis in the posterior lower lobes as seen previously. No acute findings.     CT pulmonary angiogram 9/21/2022:  FINDINGS: Examination slightly degraded by motion artifact.   The pulmonary arteries are well-opacified and normal in appearance.  No filling defect seen in any of the pulmonary arteries.     The lungs remain largely clear aside from opacities suggestive of minor pulmonary scar or atelectasis, most conspicuous dependently in the lower lobes, and inferiorly in the lingula. No pulmonary lesion at suspicion of malignancy. Small calcified granulomas in the lateral aspect of the minor fissure. Small fissural lymph nodes.     The thoracic aorta is not significantly dilated.  No dissection.     No intrathoracic or axillary adenopathy. Calcified nodes at the right pulmonary hilum.   No pericardial abnormality.     No pleural effusion or pneumothorax.   No fracture or destructive bone lesion.   A few calcified granulomas are present within normal sized spleen.   IMPRESSION: Negative for pulmonary thromboembolism.   No finding felt likely to account for hemoptysis. No pulmonary lesion concerning for malignancy. Opacities in both lungs suggestive of minor atelectasis or pulmonary scar, most conspicuous dependently in the lower lobes and inferiorly in the lingula.   No pleural, pericardial, or thoracic aortic abnormality.   If hemoptysis persists, further evaluation with bronchoscopy would be advised.     CT chest non-con 8/22/2022  IMPRESSION:  1.  Old granulomatous change evident. Calcified right hilar lymph nodes and small cluster of calcified granulomas seen within the lateral aspect of the right upper lobe.   2.  Additionally there are some small noncalcified nodules along the minor fissure measuring up to about 9 mm in size. Appearance is most suggestive of intrafissural lymph nodes. Recommend follow-up CT at 6-12 months.   3.  Bilateral hazy mosaic attenuation, probably reflective of some air trapping. Small subpleural cysts also noted posteriorly.   4.  Bilateral areas of atelectasis    PFT: never completed.    ECHO:  TTE 8/2015 CONCLUSION:    Normal LV size, wall thickness, and systolic function.      Normal RV size and function.      No significant valvular abnormality    IVC is normal in size and responsive to inspiration indicating normal      RA pressure.      Left Ventricular Ejection Fraction: 60 %          Assessment and Recommendations:   Hemoptysis: subacute presentation of nearly continuous hemoptysis for the last 5 months, however on chart review has had SOB/chest pain/? Hemoptysis going back to 2015. Ddx remains broad. No clear parenchymal disease on CT PE studies, but they have been of poor quality to look at parenchymal changes. We will obtain a high resolution CT chest without contrast, with inspiratory and expiratory views, to really evaluate the lung parenchyma. Suspect there could be a component of chronic bronchitis and ? bronchiectasis as the main  of his hemoptysis. He does have some calcified nodules/lymph nodes, perhaps an old chronic infection has distorted parenchyma and made prone to bleeding. Do not suspect active infectious process given lack of infectious symptoms and no infiltrate/cavitary lesions in imaging, but will send infectious workup including AFB and fungal with bronch. Prior bronchoscopy 9/2022 revealed no endobronchial lesions, but minimal detail in procedure note. We will repeat  bronchoscopy in the coming weeks. Ddx includes a vascular process. Reassuring that multiple PE studies have not shown PE, less likely CTEPH with no history of PE. ? Vascular malformation on bronchoscopy report, will visualize again, CTA 2015 without evidence of other vascular anomalies.  -- HRCT with anesthesia for sedation (severe claustrophobia, aggressive behaviors when in CT scanners in the past)  -- bronchoscopy for inspection, and infectious panel if area of concern on CT chest  -- recommend discussion with PCP regarding GI workup for blood in stool, ? repeat EGD    Dyspnea on exertion: likely a component of COPD given emphysematous changes on CT imaging, long smoking history. Suspect recent weight gain/medications and decreased physical activity are contributing.  -- full PFTs on return to clinic  -- ok to continue anoro ellipta and prn albuterol for now as these provide some relief    Tobacco use disorder: discussed cessation again today, will continue to encourage and offer support.    Patient staffed with Dr. Brown. Return to clinic in 6 weeks, or sooner, after testing has been completed.    Radha Meyer DO  Pulmonary and Critical Care Fellow    ADDENDUM: verbal consent for bronchoscopy with possible lavage obtained on 1/19/2023. Witnessed by Brandy Fletcher.

## 2023-01-19 ENCOUNTER — TELEPHONE (OUTPATIENT)
Dept: PULMONOLOGY | Facility: CLINIC | Age: 53
End: 2023-01-19
Payer: COMMERCIAL

## 2023-01-19 NOTE — TELEPHONE ENCOUNTER
After arranging with Jaylin from imaging scheduling and Richy from anesthesia scheduling to arrange a CT chest with anesthesia prior to bronch on Thursday 1/26 (CT at 8:20 AM and bronch with 9AM with bronch service -- Dr. Burks). Discussed with patient this information and he voiced understanding of time/date and check in time of 8AM at the [facility] waiting room. Message sent to provider and clinical team. Also arranged a FPFT prior to visit with Dr. Meyer on his follow up on 3/17.    Witnessed verbal consent for bronchoscopy between Dr. Meyer and patient, Christiano Garcia, over the phone.

## 2023-01-25 ENCOUNTER — TRANSFERRED RECORDS (OUTPATIENT)
Dept: HEALTH INFORMATION MANAGEMENT | Facility: CLINIC | Age: 53
End: 2023-01-25
Payer: COMMERCIAL

## 2023-01-25 ENCOUNTER — ANESTHESIA EVENT (OUTPATIENT)
Dept: SURGERY | Facility: CLINIC | Age: 53
End: 2023-01-25
Payer: COMMERCIAL

## 2023-01-25 NOTE — ANESTHESIA PREPROCEDURE EVALUATION
"Anesthesia Pre-Procedure Evaluation    Patient: Christiano Garcia   MRN: [id number] : 1970        Procedure : Procedure(s):  BRONCHOSCOPY          Past Medical History:   Diagnosis Date     CAD (coronary artery disease)     s/p MI     Esophagitis      h/o Acute intermittent porphyria     per patient, doubt raised by GI during 2013 admission     Kidney stones      PUD (peptic ulcer disease)       Past Surgical History:   Procedure Laterality Date     ESOPHAGOSCOPY, GASTROSCOPY, DUODENOSCOPY (EGD), COMBINED  2013    Procedure: COMBINED ESOPHAGOSCOPY, GASTROSCOPY, DUODENOSCOPY (EGD), BIOPSY SINGLE OR MULTIPLE;  EGD with cold biopsy for h pylori;  Surgeon: Jen Galo MD;  Location:  GI     ESOPHAGOSCOPY, GASTROSCOPY, DUODENOSCOPY (EGD), COMBINED  3/8/2013    Procedure: COMBINED ESOPHAGOSCOPY, GASTROSCOPY, DUODENOSCOPY (EGD);  Esophagoscopy,Gastroscopy,Duodenoscopy- Room 504;  Surgeon: Jimenez King DO;  Location:  GI     LAPAROTOMY EXPLORATORY        No Known Allergies   Social History     Tobacco Use     Smoking status: Some Days     Packs/day: 0.50     Years: 5.00     Pack years: 2.50     Types: Cigarettes     Smokeless tobacco: Never     Tobacco comments:     patient smokes \"to raise his blood pressure\" but very rarely when he is not hospitalized   Substance Use Topics     Alcohol use: No      Wt Readings from Last 1 Encounters:   05/10/22 97.1 kg (214 lb)        Anesthesia Evaluation   Pt has had prior anesthetic. Type: MAC.    No history of anesthetic complications       ROS/MED HX  ENT/Pulmonary: Comment: Hemoptysis    (+) recent URI, resolved,     Neurologic:  - neg neurologic ROS     Cardiovascular:     (+) --CAD ---    METS/Exercise Tolerance:     Hematologic:  - neg hematologic  ROS     Musculoskeletal:  - neg musculoskeletal ROS     GI/Hepatic:     (+) GERD,     Renal/Genitourinary:     (+) Nephrolithiasis ,     Endo:  - neg endo ROS     Psychiatric/Substance Use:     (+) " psychiatric history anxiety and other (comment)     Infectious Disease:  - neg infectious disease ROS     Malignancy:       Other:            Physical Exam    Airway        Mallampati: II   TM distance: > 3 FB   Neck ROM: full   Mouth opening: > 3 cm    Respiratory Devices and Support         Dental       (+) Modest Abnormalities - crowns, retainers, 1 or 2 missing teeth    B=Bridge, C=Chipped, L=Loose, M=Missing    Cardiovascular   cardiovascular exam normal          Pulmonary   pulmonary exam normal                OUTSIDE LABS:  CBC:   Lab Results   Component Value Date    WBC 10.1 01/17/2019    WBC 12.0 (H) 06/19/2018    HGB 14.2 01/17/2019    HGB 14.7 06/19/2018    HCT 42.3 01/17/2019    HCT 42.9 06/19/2018     01/17/2019     06/19/2018     BMP:   Lab Results   Component Value Date     05/10/2022     05/10/2022    POTASSIUM 3.5 05/10/2022    POTASSIUM 3.6 05/10/2022    CHLORIDE 110 (H) 05/10/2022    CHLORIDE 110 (H) 05/10/2022    CO2 21 05/10/2022    CO2 19 (L) 05/10/2022    BUN 14 05/10/2022    BUN 14 05/10/2022    CR 0.75 05/10/2022    CR 0.77 05/10/2022     (H) 05/10/2022     (H) 05/10/2022     COAGS:   Lab Results   Component Value Date    PTT 30 09/01/2014    INR 0.97 09/01/2014     POC: No results found for: BGM, HCG, HCGS  HEPATIC:   Lab Results   Component Value Date    ALBUMIN 3.8 05/10/2022    PROTTOTAL 7.6 05/10/2022    ALT 35 05/10/2022    AST 22 05/10/2022    ALKPHOS 87 05/10/2022    BILITOTAL 0.5 05/10/2022     OTHER:   Lab Results   Component Value Date    LACT 1.8 08/14/2013    TEJAL 9.8 05/10/2022    TEJAL 9.8 05/10/2022    PHOS 3.6 05/11/2013    MAG 2.5 (H) 05/11/2013    LIPASE 88 12/19/2013    AMYLASE 70 06/21/2010    CRP <5.0 06/21/2010    SED 9 06/21/2010       Anesthesia Plan    ASA Status:  2      Anesthesia Type: General.     - Airway: ETT   Induction: Propofol.   Maintenance: Other, Balanced.        Consents    Anesthesia Plan(s) and associated risks,  benefits, and realistic alternatives discussed. Questions answered and patient/representative(s) expressed understanding.    - Discussed:     - Discussed with:  Patient      - Extended Intubation/Ventilatory Support Discussed: No.      - Patient is DNR/DNI Status: No    Use of blood products discussed: No .     Postoperative Care    Pain management: IV analgesics.   PONV prophylaxis: Ondansetron (or other 5HT-3)     Comments:    Other Comments: Will need GETA, despite it being a 90 second CT scan as patient relates he cannot even see the CT scanner without getting significant PTSD and becoming violent.  Sedation alone will likely render him unable to follow instructions as is required for this imaging modality.       H&P reviewed: Unable to attach H&P to encounter due to EHR limitations. H&P Update: appropriate H&P reviewed, patient examined. No interval changes since H&P (within 30 days).         Orlando Roman MD

## 2023-01-26 ENCOUNTER — ANESTHESIA (OUTPATIENT)
Dept: SURGERY | Facility: CLINIC | Age: 53
End: 2023-01-26
Payer: COMMERCIAL

## 2023-01-26 ENCOUNTER — HOSPITAL ENCOUNTER (OUTPATIENT)
Facility: CLINIC | Age: 53
Discharge: NURSING, REHAB OR LONG-TERM CARE | End: 2023-01-26
Attending: STUDENT IN AN ORGANIZED HEALTH CARE EDUCATION/TRAINING PROGRAM | Admitting: STUDENT IN AN ORGANIZED HEALTH CARE EDUCATION/TRAINING PROGRAM
Payer: COMMERCIAL

## 2023-01-26 ENCOUNTER — HOSPITAL ENCOUNTER (OUTPATIENT)
Dept: CT IMAGING | Facility: CLINIC | Age: 53
Discharge: HOME | End: 2023-01-26
Attending: INTERNAL MEDICINE | Admitting: STUDENT IN AN ORGANIZED HEALTH CARE EDUCATION/TRAINING PROGRAM
Payer: COMMERCIAL

## 2023-01-26 ENCOUNTER — TRANSFERRED RECORDS (OUTPATIENT)
Dept: PULMONOLOGY | Facility: CLINIC | Age: 53
End: 2023-01-26

## 2023-01-26 ENCOUNTER — PRE VISIT (OUTPATIENT)
Dept: PULMONOLOGY | Facility: CLINIC | Age: 53
End: 2023-01-26

## 2023-01-26 VITALS
OXYGEN SATURATION: 93 % | HEIGHT: 75 IN | SYSTOLIC BLOOD PRESSURE: 137 MMHG | RESPIRATION RATE: 15 BRPM | BODY MASS INDEX: 29.41 KG/M2 | TEMPERATURE: 97.8 F | WEIGHT: 236.55 LBS | HEART RATE: 75 BPM | DIASTOLIC BLOOD PRESSURE: 92 MMHG

## 2023-01-26 DIAGNOSIS — R04.2 HEMOPTYSIS: ICD-10-CM

## 2023-01-26 LAB
APPEARANCE FLD: ABNORMAL
BASOPHILS NFR FLD MANUAL: 1 %
CELL COUNT BODY FLUID SOURCE: ABNORMAL
COLOR FLD: COLORLESS
EOSINOPHIL NFR FLD MANUAL: 13 %
GRAM STAIN RESULT: NORMAL
GRAM STAIN RESULT: NORMAL
KOH PREPARATION: NORMAL
KOH PREPARATION: NORMAL
LYMPHOCYTES NFR FLD MANUAL: 1 %
MONOS+MACROS NFR FLD MANUAL: NORMAL %
NEUTS BAND NFR FLD MANUAL: 77 %
OTHER CELLS FLD MANUAL: 9 %
PATH REPORT.COMMENTS IMP SPEC: NORMAL
PATH REPORT.COMMENTS IMP SPEC: NORMAL
PATH REPORT.FINAL DX SPEC: NORMAL
PATH REPORT.GROSS SPEC: NORMAL
PATH REPORT.MICROSCOPIC SPEC OTHER STN: NORMAL
PATH REPORT.RELEVANT HX SPEC: NORMAL
WBC # FLD AUTO: 28 /UL

## 2023-01-26 PROCEDURE — 360N000083 HC SURGERY LEVEL 3 W/ FLUORO, PER MIN: Performed by: STUDENT IN AN ORGANIZED HEALTH CARE EDUCATION/TRAINING PROGRAM

## 2023-01-26 PROCEDURE — 87206 SMEAR FLUORESCENT/ACID STAI: CPT | Performed by: STUDENT IN AN ORGANIZED HEALTH CARE EDUCATION/TRAINING PROGRAM

## 2023-01-26 PROCEDURE — 370N000017 HC ANESTHESIA TECHNICAL FEE, PER MIN: Performed by: STUDENT IN AN ORGANIZED HEALTH CARE EDUCATION/TRAINING PROGRAM

## 2023-01-26 PROCEDURE — 71250 CT THORAX DX C-: CPT

## 2023-01-26 PROCEDURE — 87102 FUNGUS ISOLATION CULTURE: CPT | Performed by: STUDENT IN AN ORGANIZED HEALTH CARE EDUCATION/TRAINING PROGRAM

## 2023-01-26 PROCEDURE — 250N000013 HC RX MED GY IP 250 OP 250 PS 637: Performed by: ANESTHESIOLOGY

## 2023-01-26 PROCEDURE — 88312 SPECIAL STAINS GROUP 1: CPT | Mod: TC | Performed by: STUDENT IN AN ORGANIZED HEALTH CARE EDUCATION/TRAINING PROGRAM

## 2023-01-26 PROCEDURE — 87116 MYCOBACTERIA CULTURE: CPT | Performed by: STUDENT IN AN ORGANIZED HEALTH CARE EDUCATION/TRAINING PROGRAM

## 2023-01-26 PROCEDURE — 89051 BODY FLUID CELL COUNT: CPT | Performed by: STUDENT IN AN ORGANIZED HEALTH CARE EDUCATION/TRAINING PROGRAM

## 2023-01-26 PROCEDURE — 88108 CYTOPATH CONCENTRATE TECH: CPT | Mod: 26 | Performed by: PATHOLOGY

## 2023-01-26 PROCEDURE — 710N000012 HC RECOVERY PHASE 2, PER MINUTE: Performed by: STUDENT IN AN ORGANIZED HEALTH CARE EDUCATION/TRAINING PROGRAM

## 2023-01-26 PROCEDURE — 87210 SMEAR WET MOUNT SALINE/INK: CPT | Performed by: STUDENT IN AN ORGANIZED HEALTH CARE EDUCATION/TRAINING PROGRAM

## 2023-01-26 PROCEDURE — 710N000010 HC RECOVERY PHASE 1, LEVEL 2, PER MIN: Performed by: STUDENT IN AN ORGANIZED HEALTH CARE EDUCATION/TRAINING PROGRAM

## 2023-01-26 PROCEDURE — 87081 CULTURE SCREEN ONLY: CPT | Performed by: STUDENT IN AN ORGANIZED HEALTH CARE EDUCATION/TRAINING PROGRAM

## 2023-01-26 PROCEDURE — 999N000141 HC STATISTIC PRE-PROCEDURE NURSING ASSESSMENT: Performed by: STUDENT IN AN ORGANIZED HEALTH CARE EDUCATION/TRAINING PROGRAM

## 2023-01-26 PROCEDURE — 31624 DX BRONCHOSCOPE/LAVAGE: CPT | Performed by: STUDENT IN AN ORGANIZED HEALTH CARE EDUCATION/TRAINING PROGRAM

## 2023-01-26 PROCEDURE — 87070 CULTURE OTHR SPECIMN AEROBIC: CPT | Performed by: STUDENT IN AN ORGANIZED HEALTH CARE EDUCATION/TRAINING PROGRAM

## 2023-01-26 PROCEDURE — 250N000011 HC RX IP 250 OP 636: Performed by: NURSE ANESTHETIST, CERTIFIED REGISTERED

## 2023-01-26 PROCEDURE — 250N000009 HC RX 250: Performed by: NURSE ANESTHETIST, CERTIFIED REGISTERED

## 2023-01-26 PROCEDURE — 258N000003 HC RX IP 258 OP 636: Performed by: NURSE ANESTHETIST, CERTIFIED REGISTERED

## 2023-01-26 PROCEDURE — 87205 SMEAR GRAM STAIN: CPT | Performed by: STUDENT IN AN ORGANIZED HEALTH CARE EDUCATION/TRAINING PROGRAM

## 2023-01-26 PROCEDURE — 71250 CT THORAX DX C-: CPT | Mod: 26 | Performed by: RADIOLOGY

## 2023-01-26 PROCEDURE — 88312 SPECIAL STAINS GROUP 1: CPT | Mod: 26 | Performed by: PATHOLOGY

## 2023-01-26 RX ORDER — HYDROMORPHONE HYDROCHLORIDE 1 MG/ML
0.4 INJECTION, SOLUTION INTRAMUSCULAR; INTRAVENOUS; SUBCUTANEOUS EVERY 5 MIN PRN
Status: DISCONTINUED | OUTPATIENT
Start: 2023-01-26 | End: 2023-01-26 | Stop reason: HOSPADM

## 2023-01-26 RX ORDER — PROPOFOL 10 MG/ML
INJECTION, EMULSION INTRAVENOUS PRN
Status: DISCONTINUED | OUTPATIENT
Start: 2023-01-26 | End: 2023-01-26

## 2023-01-26 RX ORDER — OXYCODONE HYDROCHLORIDE 10 MG/1
10 TABLET ORAL EVERY 4 HOURS PRN
Status: DISCONTINUED | OUTPATIENT
Start: 2023-01-26 | End: 2023-01-26 | Stop reason: HOSPADM

## 2023-01-26 RX ORDER — PROPOFOL 10 MG/ML
INJECTION, EMULSION INTRAVENOUS CONTINUOUS PRN
Status: DISCONTINUED | OUTPATIENT
Start: 2023-01-26 | End: 2023-01-26

## 2023-01-26 RX ORDER — FENTANYL CITRATE 50 UG/ML
25 INJECTION, SOLUTION INTRAMUSCULAR; INTRAVENOUS EVERY 5 MIN PRN
Status: DISCONTINUED | OUTPATIENT
Start: 2023-01-26 | End: 2023-01-26 | Stop reason: HOSPADM

## 2023-01-26 RX ORDER — ONDANSETRON 2 MG/ML
4 INJECTION INTRAMUSCULAR; INTRAVENOUS EVERY 30 MIN PRN
Status: DISCONTINUED | OUTPATIENT
Start: 2023-01-26 | End: 2023-01-26 | Stop reason: HOSPADM

## 2023-01-26 RX ORDER — KETOROLAC TROMETHAMINE 30 MG/ML
15 INJECTION, SOLUTION INTRAMUSCULAR; INTRAVENOUS
Status: DISCONTINUED | OUTPATIENT
Start: 2023-01-26 | End: 2023-01-26 | Stop reason: HOSPADM

## 2023-01-26 RX ORDER — OXYCODONE HYDROCHLORIDE 5 MG/1
5 TABLET ORAL EVERY 4 HOURS PRN
Status: DISCONTINUED | OUTPATIENT
Start: 2023-01-26 | End: 2023-01-26 | Stop reason: HOSPADM

## 2023-01-26 RX ORDER — DIPHENHYDRAMINE HCL 25 MG
25 CAPSULE ORAL EVERY 6 HOURS PRN
Status: DISCONTINUED | OUTPATIENT
Start: 2023-01-26 | End: 2023-01-26 | Stop reason: HOSPADM

## 2023-01-26 RX ORDER — ONDANSETRON 2 MG/ML
INJECTION INTRAMUSCULAR; INTRAVENOUS PRN
Status: DISCONTINUED | OUTPATIENT
Start: 2023-01-26 | End: 2023-01-26

## 2023-01-26 RX ORDER — HYDROMORPHONE HYDROCHLORIDE 1 MG/ML
0.2 INJECTION, SOLUTION INTRAMUSCULAR; INTRAVENOUS; SUBCUTANEOUS EVERY 5 MIN PRN
Status: DISCONTINUED | OUTPATIENT
Start: 2023-01-26 | End: 2023-01-26 | Stop reason: HOSPADM

## 2023-01-26 RX ORDER — FENTANYL CITRATE 50 UG/ML
50 INJECTION, SOLUTION INTRAMUSCULAR; INTRAVENOUS EVERY 5 MIN PRN
Status: DISCONTINUED | OUTPATIENT
Start: 2023-01-26 | End: 2023-01-26 | Stop reason: HOSPADM

## 2023-01-26 RX ORDER — SODIUM CHLORIDE, SODIUM LACTATE, POTASSIUM CHLORIDE, CALCIUM CHLORIDE 600; 310; 30; 20 MG/100ML; MG/100ML; MG/100ML; MG/100ML
INJECTION, SOLUTION INTRAVENOUS CONTINUOUS PRN
Status: DISCONTINUED | OUTPATIENT
Start: 2023-01-26 | End: 2023-01-26

## 2023-01-26 RX ORDER — ACETAMINOPHEN 325 MG/1
975 TABLET ORAL ONCE
Status: COMPLETED | OUTPATIENT
Start: 2023-01-26 | End: 2023-01-26

## 2023-01-26 RX ORDER — ONDANSETRON 4 MG/1
4 TABLET, ORALLY DISINTEGRATING ORAL EVERY 30 MIN PRN
Status: DISCONTINUED | OUTPATIENT
Start: 2023-01-26 | End: 2023-01-26 | Stop reason: HOSPADM

## 2023-01-26 RX ORDER — SODIUM CHLORIDE, SODIUM LACTATE, POTASSIUM CHLORIDE, CALCIUM CHLORIDE 600; 310; 30; 20 MG/100ML; MG/100ML; MG/100ML; MG/100ML
INJECTION, SOLUTION INTRAVENOUS CONTINUOUS
Status: DISCONTINUED | OUTPATIENT
Start: 2023-01-26 | End: 2023-01-26 | Stop reason: HOSPADM

## 2023-01-26 RX ORDER — FENTANYL CITRATE 50 UG/ML
INJECTION, SOLUTION INTRAMUSCULAR; INTRAVENOUS PRN
Status: DISCONTINUED | OUTPATIENT
Start: 2023-01-26 | End: 2023-01-26

## 2023-01-26 RX ORDER — DIPHENHYDRAMINE HYDROCHLORIDE 50 MG/ML
25 INJECTION INTRAMUSCULAR; INTRAVENOUS EVERY 6 HOURS PRN
Status: DISCONTINUED | OUTPATIENT
Start: 2023-01-26 | End: 2023-01-26 | Stop reason: HOSPADM

## 2023-01-26 RX ORDER — DEXAMETHASONE SODIUM PHOSPHATE 4 MG/ML
INJECTION, SOLUTION INTRA-ARTICULAR; INTRALESIONAL; INTRAMUSCULAR; INTRAVENOUS; SOFT TISSUE PRN
Status: DISCONTINUED | OUTPATIENT
Start: 2023-01-26 | End: 2023-01-26

## 2023-01-26 RX ORDER — LIDOCAINE HYDROCHLORIDE 20 MG/ML
INJECTION, SOLUTION INFILTRATION; PERINEURAL PRN
Status: DISCONTINUED | OUTPATIENT
Start: 2023-01-26 | End: 2023-01-26

## 2023-01-26 RX ADMIN — ONDANSETRON 4 MG: 2 INJECTION INTRAMUSCULAR; INTRAVENOUS at 10:09

## 2023-01-26 RX ADMIN — Medication 50 MG: at 10:02

## 2023-01-26 RX ADMIN — Medication 30 MG: at 10:35

## 2023-01-26 RX ADMIN — LIDOCAINE HYDROCHLORIDE 100 MG: 20 INJECTION, SOLUTION INFILTRATION; PERINEURAL at 10:00

## 2023-01-26 RX ADMIN — PROPOFOL 150 MCG/KG/MIN: 10 INJECTION, EMULSION INTRAVENOUS at 10:00

## 2023-01-26 RX ADMIN — DEXAMETHASONE SODIUM PHOSPHATE 8 MG: 4 INJECTION, SOLUTION INTRA-ARTICULAR; INTRALESIONAL; INTRAMUSCULAR; INTRAVENOUS; SOFT TISSUE at 10:09

## 2023-01-26 RX ADMIN — MIDAZOLAM 2 MG: 1 INJECTION INTRAMUSCULAR; INTRAVENOUS at 09:44

## 2023-01-26 RX ADMIN — PROPOFOL 200 MG: 10 INJECTION, EMULSION INTRAVENOUS at 10:01

## 2023-01-26 RX ADMIN — ACETAMINOPHEN 975 MG: 325 TABLET ORAL at 08:36

## 2023-01-26 RX ADMIN — SUGAMMADEX 200 MG: 100 INJECTION, SOLUTION INTRAVENOUS at 10:45

## 2023-01-26 RX ADMIN — FENTANYL CITRATE 100 MCG: 50 INJECTION, SOLUTION INTRAMUSCULAR; INTRAVENOUS at 10:00

## 2023-01-26 RX ADMIN — FENTANYL CITRATE 100 MCG: 50 INJECTION, SOLUTION INTRAMUSCULAR; INTRAVENOUS at 10:37

## 2023-01-26 RX ADMIN — SODIUM CHLORIDE, POTASSIUM CHLORIDE, SODIUM LACTATE AND CALCIUM CHLORIDE: 600; 310; 30; 20 INJECTION, SOLUTION INTRAVENOUS at 09:47

## 2023-01-26 ASSESSMENT — ACTIVITIES OF DAILY LIVING (ADL)
ADLS_ACUITY_SCORE: 37
ADLS_ACUITY_SCORE: 35
ADLS_ACUITY_SCORE: 37

## 2023-01-26 NOTE — PROCEDURES
INTERVENTIONAL PULMONOLOGY       Procedure(s):    A flexible bronchoscopy  Airway exam  BAL    Indication:  Hemoptysis    Attending of Record:  Rochelle Burks MD     Interventional Pulmonary Fellow   None    Trainees Present:   Sumit Almodovar MD     Medications:    General Anesthesia - See anesthesia flowsheet for details    Sedation Time:   Per Anesthesia Care Provider    Time Out:  Performed    The patient's medical record has been reviewed.  The indication for the procedure was reviewed.  The necessary history and physical examination was performed and reviewed.  The risks, benefits and alternatives of the procedure were discussed with the the patient in detail and he had the opportunity to ask questions..  Verbal telephone informed consent was obtained.  The proposed procedure and the patient's identification were verified prior to the procedure by the physician and the surgical team.    After clinical evaluation and reviewing the indication, risks, alternatives and benefits of the procedure the patient was deemed to be in satisfactory condition to undergo the procedure.      A Tuberculosis risk assessment was performed:  The patient has no known RISK of Tuberculosis    The procedure was performed in a negative airflow room: The patient could not be moved to a negative airflow room because of needed OR for the procedure    Maneuvers / Procedure:      A Flexible  bronchoscope was used for the procedure. The patient was orally intubated with a # 9 ETT and the flexible scope was passed through.      Airway Examination: A complete airway examination was performed from the distal trachea to at least the second subsegmental level in each lobe of both lungs. Scope was advanced as far as possible into each subsegment. There were no endobronchial lesions or abnormal mucosa noted. There was no evidence of any previous or current bleeding in either lung.      BAL: The bronchoscope was wedged into the RLL lateral bronchus. A  total of 120cc of saline was instilled and a total of 25cc cellular fluid was aspirated. There was no blood in the lavage return. This was sent for analysis.     Any disposable equipment was visually inspected and deemed to be intact immediately post procedure.      Relevant Pictures: None    Recommendations:     --Followup BAL results.  --Followup with referring pulmonary provider.     Rochelle Burks MD  Interventional Pulmonary

## 2023-01-26 NOTE — ANESTHESIA CARE TRANSFER NOTE
Patient: Christiano Garcia    Procedure: Procedure(s):  BRONCHOSCOPY,  Airway Inspection  BRONCHOALVEOLAR LAVAGE       Diagnosis: Hemoptysis [R04.2]  Diagnosis Additional Information: No value filed.    Anesthesia Type:   MAC     Note:    Oropharynx: oropharynx clear of all foreign objects  Level of Consciousness: awake  Oxygen Supplementation: face mask  Level of Supplemental Oxygen (L/min / FiO2): 8  Independent Airway: airway patency satisfactory and stable  Dentition: dentition unchanged  Vital Signs Stable: post-procedure vital signs reviewed and stable  Report to RN Given: handoff report given  Patient transferred to: PACU    Handoff Report: Identifed the Patient, Identified the Reponsible Provider, Reviewed the pertinent medical history, Discussed the surgical course, Reviewed Intra-OP anesthesia mangement and issues during anesthesia, Set expectations for post-procedure period and Allowed opportunity for questions and acknowledgement of understanding      Vitals:  Vitals Value Taken Time   /81 01/26/23 1101   Temp     Pulse 90 01/26/23 1104   Resp 22 01/26/23 1104   SpO2 97 % 01/26/23 1104   Vitals shown include unvalidated device data.    Electronically Signed By: SO Boyd CRNA  January 26, 2023  11:06 AM

## 2023-01-26 NOTE — ANESTHESIA POSTPROCEDURE EVALUATION
Patient: Christiano Garcia    Procedure: Procedure(s):  BRONCHOSCOPY,  Airway Inspection  BRONCHOALVEOLAR LAVAGE       Anesthesia Type:  MAC    Note:  Disposition: Outpatient   Postop Pain Control: Uneventful            Sign Out: Well controlled pain   PONV: No   Neuro/Psych: Uneventful            Sign Out: Acceptable/Baseline neuro status   Airway/Respiratory: Uneventful            Sign Out: Acceptable/Baseline resp. status   CV/Hemodynamics: Uneventful            Sign Out: Acceptable CV status; No obvious hypovolemia; No obvious fluid overload   Other NRE: NONE   DID A NON-ROUTINE EVENT OCCUR? No           Last vitals:  Vitals Value Taken Time   /83 01/26/23 1130   Temp     Pulse 73 01/26/23 1136   Resp 10 01/26/23 1136   SpO2 95 % 01/26/23 1136   Vitals shown include unvalidated device data.    Electronically Signed By: Arturo Lopes MD  January 26, 2023  11:37 AM

## 2023-01-26 NOTE — DISCHARGE INSTRUCTIONS
Post-Bronchoscopy Patient Instructions:    2023  Christiano A Zoch    Your procedure (bronchoscopy with airway inspection, lung washing) was completed without any immediate complications.    You may cough up scant amount of blood for the next 12-24 hours. If you have excessive cough with blood, chest pain, shortness of breath or other concerning symptoms, please report to the closest emergency room.    You may experience low grade (less than 100.5 F) fever next 24 hours for which you can take Tylenol. If the fever persists more than 24 hours contact our office or your primary care provider.    You  resume your regular diet as it was prior to procedure.      [facility]  Same-Day Surgery   Adult Discharge Orders & Instructions     For 24 hours after surgery    Get plenty of rest.  A responsible adult must stay with you for at least 24 hours after you leave the hospital.   Do not drive or use heavy equipment.  If you have weakness or tingling, don't drive or use heavy equipment until this feeling goes away.  Do not drink alcohol.  Avoid strenuous or risky activities.  Ask for help when climbing stairs.   You may feel lightheaded.  IF so, sit for a few minutes before standing.  Have someone help you get up.   If you have nausea (feel sick to your stomach): Drink only clear liquids such as apple juice, ginger ale, broth or 7-Up.  Rest may also help.  Be sure to drink enough fluids.  Move to a regular diet as you feel able.  You may have a slight fever. Call the doctor if your fever is over 100 F (37.7 C) (taken under the tongue) or lasts longer than 24 hours.  You may have a dry mouth, a sore throat, muscle aches or trouble sleeping.  These should go away after 24 hours.  Do not make important or legal decisions.   Call your doctor for any of the followin.  Signs of infection (fever, growing tenderness at the surgery site, a large amount of drainage or bleeding, severe  pain, foul-smelling drainage, redness, swelling).    2. It has been over 8 to 10 hours since surgery and you are still not able to urinate (pass water).    3.  Headache for over 24 hours.    4.  Numbness, tingling or weakness the day after surgery (if you had spinal anesthesia).  To contact a doctor, call Dr. Burks's clinic at [phone] or:    '   [phone] and ask for the resident on call for  pulmonary (answered 24 hours a day)  '   Emergency Department:    [facility]: [phone]       (TTY for hearing impaired: [phone])

## 2023-01-27 ENCOUNTER — NURSE TRIAGE (OUTPATIENT)
Dept: NURSING | Facility: CLINIC | Age: 53
End: 2023-01-27
Payer: COMMERCIAL

## 2023-01-27 ENCOUNTER — TELEPHONE (OUTPATIENT)
Dept: PULMONOLOGY | Facility: CLINIC | Age: 53
End: 2023-01-27
Payer: COMMERCIAL

## 2023-01-27 NOTE — TELEPHONE ENCOUNTER
"Pt calling in to triage today along with a visiting nurse at the facility where Christiano is living. The call is checking in post procedure :  BRONCHOSCOPY,  Airway Inspection ([facility])   BRONCHOALVEOLAR LAVAGE ([facility])  CT scan  Last night Pt was in significant distress with breathing and EMS was called in at that time. At that time SpO2 was in the 70's currently it is now 90's and stable per RN.  The questions that this bring up is while  He doesn't seem to be in enough distress to warrant a visit to ED now. Where can he call if these dips or increase in s/sx reoccur prior to being contacted by the provider with results and next steps post procedure.  AVS was reviewed by RN. Pt given contact for FNA if s/sx reoccur or become worse.  Disposition to home care for now but Pt agrees to call back with any additional issue or with s/sx changes.  Amenable to plan and verbalizes agreement and understanding.  Kenisha Phoenix RN, MN, PHN on 1/27/2023 at 11:08 AM  [facility]  RN utilized sound nursing judgement based on facility triage protocols during this encounter.      Reason for Disposition    Information only question and nurse able to answer    Additional Information    Negative: Nursing judgment    Negative: Nursing judgment    Negative: Nursing judgment    Negative: Nursing judgment    Answer Assessment - Initial Assessment Questions  1. REASON FOR CALL or QUESTION: \"What is your reason for calling today?\" or \"How can I best help you?\" or \"What question do you have that I can help answer?\"      Questions post procedure    Protocols used: INFORMATION ONLY CALL - NO TRIAGE-A-[place]      "

## 2023-01-27 NOTE — TELEPHONE ENCOUNTER
Telephone call:  Called to follow up on CT chest and bronchoscopy from 1/26.  He had an episode of low oxygen saturation last night, but it has recovered. He feels a little poorly today as well. He coughed up a little blood after the procedure yesterday.   We discussed CT results, and that bronch results will take some time to return.    - return to clinic in the next 4 weeks with full PFTs prior, need to discuss results from BAL and lymphadenopathy  - if hemoptysis recurs, will consider referral to GI, ? ENT to evaluate for alternate etiologies, vs additional vascular imaging  - awaiting results from BAL for evidence of infection vs lipoid pneumonia  - RTC/ED as needed discussed    Radha Meyer, DO  Pulmonary/critical care fellow

## 2023-01-27 NOTE — TELEPHONE ENCOUNTER
Spoke with pt regarding scheduling sooner appt in clinic and moving up FPFT from March after being instructed by Dr. Meyer. Appt available with Dr. Begum on 2/1 and pt is agreeable. FPFT was moved from March to prior to visit with Dr. Begum and we kept appt with Dr. Meyer in March. Appt details confirmed with pt

## 2023-01-28 LAB — BACTERIA BRONCH: NORMAL

## 2023-02-09 LAB — BACTERIA BRONCH: NORMAL

## 2023-02-23 LAB
BACTERIA BRONCH: NO GROWTH
BACTERIA BRONCH: NO GROWTH

## 2023-02-27 NOTE — ADDENDUM NOTE
Addendum  created 02/27/23 0731 by Orlando Roman MD    Attestation recorded in Intraprocedure, Intraprocedure Attestations filed

## 2023-03-23 LAB
ACID FAST STAIN (ARUP): NORMAL
ACID FAST STAIN (ARUP): NORMAL

## 2023-04-24 PROCEDURE — 88305 TISSUE EXAM BY PATHOLOGIST: CPT | Performed by: PATHOLOGY

## 2023-04-25 ENCOUNTER — LAB REQUISITION (OUTPATIENT)
Dept: LAB | Facility: CLINIC | Age: 53
End: 2023-04-25

## 2023-04-25 DIAGNOSIS — K92.0 HEMATEMESIS: ICD-10-CM

## 2023-04-25 DIAGNOSIS — R12 HEARTBURN: ICD-10-CM

## 2023-04-25 DIAGNOSIS — K31.89 OTHER DISEASES OF STOMACH AND DUODENUM: ICD-10-CM

## 2023-04-26 LAB
PATH REPORT.COMMENTS IMP SPEC: NORMAL
PATH REPORT.COMMENTS IMP SPEC: NORMAL
PATH REPORT.FINAL DX SPEC: NORMAL
PATH REPORT.GROSS SPEC: NORMAL
PATH REPORT.MICROSCOPIC SPEC OTHER STN: NORMAL
PATH REPORT.RELEVANT HX SPEC: NORMAL
PHOTO IMAGE: NORMAL

## 2023-06-01 ENCOUNTER — HEALTH MAINTENANCE LETTER (OUTPATIENT)
Age: 53
End: 2023-06-01

## 2023-06-06 ENCOUNTER — APPOINTMENT (OUTPATIENT)
Dept: GENERAL RADIOLOGY | Facility: CLINIC | Age: 53
End: 2023-06-06
Attending: NURSE PRACTITIONER
Payer: COMMERCIAL

## 2023-06-06 ENCOUNTER — HOSPITAL ENCOUNTER (EMERGENCY)
Facility: CLINIC | Age: 53
Discharge: LEFT BEFORE DISCHARGE | End: 2023-06-07
Attending: EMERGENCY MEDICINE | Admitting: EMERGENCY MEDICINE
Payer: COMMERCIAL

## 2023-06-06 DIAGNOSIS — K62.5 BRBPR (BRIGHT RED BLOOD PER RECTUM): ICD-10-CM

## 2023-06-06 DIAGNOSIS — R07.9 CHEST PAIN: ICD-10-CM

## 2023-06-06 DIAGNOSIS — R04.2 HEMOPTYSIS: ICD-10-CM

## 2023-06-06 LAB
ALBUMIN SERPL BCG-MCNC: 4.2 G/DL (ref 3.5–5.2)
ALP SERPL-CCNC: 93 U/L (ref 40–129)
ALT SERPL W P-5'-P-CCNC: 29 U/L (ref 10–50)
ANION GAP SERPL CALCULATED.3IONS-SCNC: 13 MMOL/L (ref 7–15)
AST SERPL W P-5'-P-CCNC: 26 U/L (ref 10–50)
ATRIAL RATE - MUSE: 84 BPM
BASOPHILS # BLD AUTO: 0.1 10E3/UL (ref 0–0.2)
BASOPHILS NFR BLD AUTO: 1 %
BILIRUB SERPL-MCNC: <0.2 MG/DL
BUN SERPL-MCNC: 13.3 MG/DL (ref 6–20)
CALCIUM SERPL-MCNC: 9.2 MG/DL (ref 8.6–10)
CHLORIDE SERPL-SCNC: 110 MMOL/L (ref 98–107)
CREAT SERPL-MCNC: 0.82 MG/DL (ref 0.67–1.17)
DEPRECATED HCO3 PLAS-SCNC: 18 MMOL/L (ref 22–29)
DIASTOLIC BLOOD PRESSURE - MUSE: NORMAL MMHG
EOSINOPHIL # BLD AUTO: 0.5 10E3/UL (ref 0–0.7)
EOSINOPHIL NFR BLD AUTO: 6 %
ERYTHROCYTE [DISTWIDTH] IN BLOOD BY AUTOMATED COUNT: 13.6 % (ref 10–15)
GFR SERPL CREATININE-BSD FRML MDRD: >90 ML/MIN/1.73M2
GLUCOSE SERPL-MCNC: 105 MG/DL (ref 70–99)
HCT VFR BLD AUTO: 40.3 % (ref 40–53)
HGB BLD-MCNC: 13.5 G/DL (ref 13.3–17.7)
HOLD SPECIMEN: NORMAL
IMM GRANULOCYTES # BLD: 0.1 10E3/UL
IMM GRANULOCYTES NFR BLD: 1 %
INR PPP: 1 (ref 0.85–1.15)
INTERPRETATION ECG - MUSE: NORMAL
LYMPHOCYTES # BLD AUTO: 2.7 10E3/UL (ref 0.8–5.3)
LYMPHOCYTES NFR BLD AUTO: 33 %
MCH RBC QN AUTO: 29.1 PG (ref 26.5–33)
MCHC RBC AUTO-ENTMCNC: 33.5 G/DL (ref 31.5–36.5)
MCV RBC AUTO: 87 FL (ref 78–100)
MONOCYTES # BLD AUTO: 0.8 10E3/UL (ref 0–1.3)
MONOCYTES NFR BLD AUTO: 10 %
NEUTROPHILS # BLD AUTO: 4.2 10E3/UL (ref 1.6–8.3)
NEUTROPHILS NFR BLD AUTO: 49 %
NRBC # BLD AUTO: 0 10E3/UL
NRBC BLD AUTO-RTO: 0 /100
P AXIS - MUSE: NORMAL DEGREES
PLATELET # BLD AUTO: 296 10E3/UL (ref 150–450)
POTASSIUM SERPL-SCNC: 4 MMOL/L (ref 3.4–5.3)
PR INTERVAL - MUSE: NORMAL MS
PROT SERPL-MCNC: 7 G/DL (ref 6.4–8.3)
QRS DURATION - MUSE: 78 MS
QT - MUSE: 356 MS
QTC - MUSE: 420 MS
R AXIS - MUSE: -31 DEGREES
RBC # BLD AUTO: 4.64 10E6/UL (ref 4.4–5.9)
SODIUM SERPL-SCNC: 141 MMOL/L (ref 136–145)
SYSTOLIC BLOOD PRESSURE - MUSE: NORMAL MMHG
T AXIS - MUSE: 76 DEGREES
TROPONIN T SERPL HS-MCNC: 6 NG/L
TROPONIN T SERPL HS-MCNC: 7 NG/L
VENTRICULAR RATE- MUSE: 84 BPM
WBC # BLD AUTO: 8.4 10E3/UL (ref 4–11)

## 2023-06-06 PROCEDURE — 71046 X-RAY EXAM CHEST 2 VIEWS: CPT

## 2023-06-06 PROCEDURE — 84484 ASSAY OF TROPONIN QUANT: CPT | Performed by: EMERGENCY MEDICINE

## 2023-06-06 PROCEDURE — 71046 X-RAY EXAM CHEST 2 VIEWS: CPT | Mod: 26 | Performed by: STUDENT IN AN ORGANIZED HEALTH CARE EDUCATION/TRAINING PROGRAM

## 2023-06-06 PROCEDURE — 93005 ELECTROCARDIOGRAM TRACING: CPT | Performed by: EMERGENCY MEDICINE

## 2023-06-06 PROCEDURE — 99285 EMERGENCY DEPT VISIT HI MDM: CPT | Mod: 25 | Performed by: EMERGENCY MEDICINE

## 2023-06-06 PROCEDURE — 80053 COMPREHEN METABOLIC PANEL: CPT | Performed by: EMERGENCY MEDICINE

## 2023-06-06 PROCEDURE — 96374 THER/PROPH/DIAG INJ IV PUSH: CPT | Performed by: EMERGENCY MEDICINE

## 2023-06-06 PROCEDURE — 93010 ELECTROCARDIOGRAM REPORT: CPT | Performed by: EMERGENCY MEDICINE

## 2023-06-06 PROCEDURE — 250N000011 HC RX IP 250 OP 636: Performed by: EMERGENCY MEDICINE

## 2023-06-06 PROCEDURE — 85610 PROTHROMBIN TIME: CPT | Performed by: EMERGENCY MEDICINE

## 2023-06-06 PROCEDURE — 120N000002 HC R&B MED SURG/OB UMMC

## 2023-06-06 PROCEDURE — 36415 COLL VENOUS BLD VENIPUNCTURE: CPT | Performed by: EMERGENCY MEDICINE

## 2023-06-06 PROCEDURE — 84484 ASSAY OF TROPONIN QUANT: CPT | Performed by: NURSE PRACTITIONER

## 2023-06-06 PROCEDURE — 82306 VITAMIN D 25 HYDROXY: CPT

## 2023-06-06 PROCEDURE — 85384 FIBRINOGEN ACTIVITY: CPT

## 2023-06-06 PROCEDURE — 36415 COLL VENOUS BLD VENIPUNCTURE: CPT | Performed by: NURSE PRACTITIONER

## 2023-06-06 PROCEDURE — 85025 COMPLETE CBC W/AUTO DIFF WBC: CPT | Performed by: EMERGENCY MEDICINE

## 2023-06-06 PROCEDURE — 83880 ASSAY OF NATRIURETIC PEPTIDE: CPT | Performed by: EMERGENCY MEDICINE

## 2023-06-06 RX ORDER — LORAZEPAM 2 MG/ML
1 INJECTION INTRAMUSCULAR ONCE
Status: COMPLETED | OUTPATIENT
Start: 2023-06-06 | End: 2023-06-06

## 2023-06-06 RX ADMIN — LORAZEPAM 1 MG: 2 INJECTION INTRAMUSCULAR; INTRAVENOUS at 22:57

## 2023-06-06 ASSESSMENT — ACTIVITIES OF DAILY LIVING (ADL)
ADLS_ACUITY_SCORE: 37
ADLS_ACUITY_SCORE: 37

## 2023-06-07 VITALS
DIASTOLIC BLOOD PRESSURE: 80 MMHG | BODY MASS INDEX: 29.57 KG/M2 | HEIGHT: 75 IN | SYSTOLIC BLOOD PRESSURE: 109 MMHG | OXYGEN SATURATION: 96 % | HEART RATE: 90 BPM | TEMPERATURE: 97.4 F | RESPIRATION RATE: 18 BRPM

## 2023-06-07 LAB
ANION GAP SERPL CALCULATED.3IONS-SCNC: 11 MMOL/L (ref 7–15)
APTT PPP: 28 SECONDS (ref 22–38)
ATRIAL RATE - MUSE: 87 BPM
BUN SERPL-MCNC: 14.8 MG/DL (ref 6–20)
CALCIUM SERPL-MCNC: 9.1 MG/DL (ref 8.6–10)
CHLORIDE SERPL-SCNC: 108 MMOL/L (ref 98–107)
CHOLEST SERPL-MCNC: 210 MG/DL
CREAT SERPL-MCNC: 0.89 MG/DL (ref 0.67–1.17)
CRP SERPL-MCNC: <3 MG/L
DEPRECATED CALCIDIOL+CALCIFEROL SERPL-MC: 29 UG/L (ref 20–75)
DEPRECATED HCO3 PLAS-SCNC: 21 MMOL/L (ref 22–29)
DIASTOLIC BLOOD PRESSURE - MUSE: NORMAL MMHG
ERYTHROCYTE [DISTWIDTH] IN BLOOD BY AUTOMATED COUNT: 13.7 % (ref 10–15)
ERYTHROCYTE [SEDIMENTATION RATE] IN BLOOD BY WESTERGREN METHOD: 13 MM/HR (ref 0–20)
FIBRINOGEN PPP-MCNC: 322 MG/DL (ref 170–490)
GFR SERPL CREATININE-BSD FRML MDRD: >90 ML/MIN/1.73M2
GLUCOSE SERPL-MCNC: 114 MG/DL (ref 70–99)
HCT VFR BLD AUTO: 39.6 % (ref 40–53)
HDLC SERPL-MCNC: 43 MG/DL
HGB BLD-MCNC: 12.8 G/DL (ref 13.3–17.7)
INTERPRETATION ECG - MUSE: NORMAL
LDLC SERPL CALC-MCNC: 102 MG/DL
MCH RBC QN AUTO: 28.9 PG (ref 26.5–33)
MCHC RBC AUTO-ENTMCNC: 32.3 G/DL (ref 31.5–36.5)
MCV RBC AUTO: 89 FL (ref 78–100)
NONHDLC SERPL-MCNC: 167 MG/DL
NT-PROBNP SERPL-MCNC: 18 PG/ML (ref 0–900)
P AXIS - MUSE: 31 DEGREES
PLATELET # BLD AUTO: 280 10E3/UL (ref 150–450)
POTASSIUM SERPL-SCNC: 3.9 MMOL/L (ref 3.4–5.3)
PR INTERVAL - MUSE: 164 MS
QRS DURATION - MUSE: 86 MS
QT - MUSE: 356 MS
QTC - MUSE: 428 MS
R AXIS - MUSE: -25 DEGREES
RBC # BLD AUTO: 4.43 10E6/UL (ref 4.4–5.9)
RETICS # AUTO: 0.08 10E6/UL (ref 0.03–0.1)
RETICS/RBC NFR AUTO: 1.8 % (ref 0.5–2)
SODIUM SERPL-SCNC: 140 MMOL/L (ref 136–145)
SYSTOLIC BLOOD PRESSURE - MUSE: NORMAL MMHG
T AXIS - MUSE: 60 DEGREES
TRIGL SERPL-MCNC: 324 MG/DL
VENTRICULAR RATE- MUSE: 87 BPM
WBC # BLD AUTO: 6.2 10E3/UL (ref 4–11)

## 2023-06-07 PROCEDURE — 85045 AUTOMATED RETICULOCYTE COUNT: CPT

## 2023-06-07 PROCEDURE — 250N000013 HC RX MED GY IP 250 OP 250 PS 637

## 2023-06-07 PROCEDURE — 36415 COLL VENOUS BLD VENIPUNCTURE: CPT

## 2023-06-07 PROCEDURE — 86140 C-REACTIVE PROTEIN: CPT

## 2023-06-07 PROCEDURE — 85730 THROMBOPLASTIN TIME PARTIAL: CPT

## 2023-06-07 PROCEDURE — 85652 RBC SED RATE AUTOMATED: CPT

## 2023-06-07 PROCEDURE — 85027 COMPLETE CBC AUTOMATED: CPT

## 2023-06-07 PROCEDURE — 80061 LIPID PANEL: CPT

## 2023-06-07 PROCEDURE — 80048 BASIC METABOLIC PNL TOTAL CA: CPT

## 2023-06-07 RX ORDER — GABAPENTIN 300 MG/1
300 CAPSULE ORAL 3 TIMES DAILY
Status: DISCONTINUED | OUTPATIENT
Start: 2023-06-07 | End: 2023-06-08 | Stop reason: HOSPADM

## 2023-06-07 RX ORDER — LORAZEPAM 0.5 MG/1
0.5 TABLET ORAL EVERY 4 HOURS PRN
Status: DISCONTINUED | OUTPATIENT
Start: 2023-06-07 | End: 2023-06-08 | Stop reason: HOSPADM

## 2023-06-07 RX ORDER — ONDANSETRON 4 MG/1
4 TABLET, FILM COATED ORAL EVERY 6 HOURS PRN
Status: DISCONTINUED | OUTPATIENT
Start: 2023-06-07 | End: 2023-06-08 | Stop reason: HOSPADM

## 2023-06-07 RX ORDER — PANTOPRAZOLE SODIUM 40 MG/1
40 TABLET, DELAYED RELEASE ORAL
Status: DISCONTINUED | OUTPATIENT
Start: 2023-06-07 | End: 2023-06-08 | Stop reason: HOSPADM

## 2023-06-07 RX ORDER — ALBUTEROL SULFATE 90 UG/1
2 AEROSOL, METERED RESPIRATORY (INHALATION) EVERY 6 HOURS PRN
Status: DISCONTINUED | OUTPATIENT
Start: 2023-06-07 | End: 2023-06-08 | Stop reason: HOSPADM

## 2023-06-07 RX ORDER — LIDOCAINE 40 MG/G
CREAM TOPICAL
Status: DISCONTINUED | OUTPATIENT
Start: 2023-06-07 | End: 2023-06-08 | Stop reason: HOSPADM

## 2023-06-07 RX ORDER — ATORVASTATIN CALCIUM 40 MG/1
40 TABLET, FILM COATED ORAL EVERY EVENING
Status: DISCONTINUED | OUTPATIENT
Start: 2023-06-07 | End: 2023-06-08 | Stop reason: HOSPADM

## 2023-06-07 RX ORDER — ZOLPIDEM TARTRATE 5 MG/1
10 TABLET ORAL
Status: DISCONTINUED | OUTPATIENT
Start: 2023-06-07 | End: 2023-06-08 | Stop reason: HOSPADM

## 2023-06-07 RX ORDER — CLONAZEPAM 0.5 MG/1
0.5 TABLET ORAL 3 TIMES DAILY
Status: DISCONTINUED | OUTPATIENT
Start: 2023-06-07 | End: 2023-06-08 | Stop reason: HOSPADM

## 2023-06-07 RX ADMIN — LORAZEPAM 0.5 MG: 0.5 TABLET ORAL at 02:17

## 2023-06-07 ASSESSMENT — ACTIVITIES OF DAILY LIVING (ADL)
ADLS_ACUITY_SCORE: 37

## 2023-06-07 NOTE — PROGRESS NOTES
"Dr. Yanez paged:     Would you be able to come to talk to the patient? He claims that he is getting a Lyft and leaving, and when I asked him if he was serious he said \"i'm not talking to you guys anymore. you don't tell the truth\"  "

## 2023-06-07 NOTE — PROGRESS NOTES
Writer initially paged MD about patient wanting to leave AMA. MD said pt did not look like he wanted to talk, so they did not talk. About an hour later, patient became very verbally aggressive towards staff when he decided he was going to leave AMA. Writer advised patient that it is dangerous to leave AMA and there is a risk of death. He proceeded to pack up his things and started walking out. RN asked if I could remove the patient's IV but patient became more verbally and physically abusive with staff. Security was present at this time. Patient eloped from building with PIV still in arm.

## 2023-06-07 NOTE — DISCHARGE SUMMARY
Notified by nurse that the patient was actively trying to leave against medical advice. Presented to the ED to discuss with the patient but he had already left the hospital and was in the ED entrance waiting for a ride. The patient denied any discussion to stay inpatient for further evaluation. He firmly denied any intention to hurt himself. He acknowledges the risks of leaving the hospital AMA including new hemoptysis or hematochezia and return of chest pain that could lead to more severe sequelae in the setting of potential undiagnosed heart disease.    Please refer to the H&P from tonight for further details regarding patient stay and treatment plan.    Kimmie Yanez MD  Internal Medicine, PGY-2  [facility]  [phone]

## 2023-06-07 NOTE — PROGRESS NOTES
Patient took off tele leads and is refusing to wear them. RN explained the necessity of the telemetry, but patient is addiment that he is not wearing it. MD verbally notified and he stated to just let the patient rest.

## 2023-06-07 NOTE — H&P
[facility]    History and Physical - Medicine Service, MAROON TEAM        Date of Admission:  6/6/2023    Assessment & Plan      Christiano Garcia is a 53 year old male with PMH s/f suicidal attempt s/p prolonged hospitalization in [place], chronic hemoptysis of unknown etiology and PTSD who presents for new hemoptysis and chest pain.    #Chest pain  #Hyperlipidemia  #Hypertensive urgency  Patient is chest pain free at this time, EKG without ST segment abnormalities, troponins are negative. Cause unclear, but would mostly suspect ischemia secondary to acute hypertensive emergency (that has now resolved or coronary vasospasm. The patient had a normal stress test in 2018 and never had similar chest pain prior to today's appointment. He has a smoking history and does have hyperlipidemia for which he is currently treated with a statin.   -Admit to medicine  -Hold aspirin in the setting of hemoptysis  -LDL ordered   -TTE this AM  -Telemetry    #Syncope  Single episode one week ago, the patient had a mechanical fall while ambulating from the bathroom. No real prodromes, was not witnessed, no stool or urine surrounding him after the fall. Remote history of neurogenic syncope about 10 years ago with no recurrence since. The episode hs not been worked up.  -TTE  -Orthostatics  -Telemetry    #Hemoptysis  #Hematochezia  This patient has been having hemoptysis of unknown cause since his discharge from [place] in September 2022. He has had two bronchoscopies in [place] and [facility] that were all negative. No evidence of PE on multiple CTAs.  Prior imaging showing intrafissular lymphadenopathy with small nodular densities, overall picture favoring lipoid pneumonia. Autoimmune work-up has thus far been unremarkable. The patient today notes that for the first time he has BRBPR upon wiping himself. Denies chronic melena or ever having hemoptysis before. Of note, chart review  reveals that he  has been evaluated before with colonoscopy and esophagogram for BRBPR that was normal  Does have mild anemia. Has been discussed that he might have had porphyria, but no official diagnosis has been made. His hemoptysis does not appear to have ever been witnessed.   - Reticulocytes  - NPO  - Pulmonary consulted, appreciate recs    #COPD  On inhalers, no PFTs in chart to confirm diagnosis. The patient notes that he has been taking the inhalers and they are helpful.  - PTA albuterol  - PTA ellipta  - Consider PFTs as outpatient    #GERD  - PTA pantoprazole    #PTSD c/b suicide attempt  Prolonged hospitalization in [place] in 2022. The patient attributes that to significant social stress after he was alone for the first time when his kids left for a volleyball camp. The patient is currently not suicidal.  - PTA clonazepam  - Suicide precautions  - PTA gabapentin  - PRN lorazepam           Diet: NPO for Medical/Clinical Reasons Except for: Meds    DVT Prophylaxis: Ambulate every shift  Baig Catheter: Not present  Fluids: None  Lines: None     Cardiac Monitoring: None  Code Status: Full Code      Clinically Significant Risk Factors Present on Admission                                Disposition Plan      Expected Discharge Date: 06/08/2023                The patient will be formally staffed this AM    Kimmie Yanez MD  Medicine Service, [facility]  Securely message with [employer] (more info)  Text page via [facility] Paging/Directory   See signed in provider for up to date coverage information  ______________________________________________________________________    Chief Complaint   Hemoptysis, and chest pain    History is obtained from the patient    History of Present Illness   Christiano A Zoch is a 53 year old male with PMH s/f suicidal attempt s/p prolonged hospitalization in [place], chronic unexplained hemoptysis and PTSD who presents for new hemoptysis and chest  pain. The patient was in his facility today and the afternoon started feeling unwell. Soon afterwards, he developed a crushing pain in the center of his chest that was persistent and would not go away. His breathing was hard at the same time. The patient never had a pain similar to this before. At the time of the pain was also severely hypotensive. During the episode, he felt a severe pain radiating to the left side of his jaw and his whole left arm became numb.  Since, the pain has been coming and going with each episode lasting up to five minutes. The patient notes that his hemoptysis has been ongoing. It is described to have a waxingand waning pattern, over the last few days it has been more severe. Today the patient had bright red blood per rectum while he was having a bowel movement. Never had anything similar to this before. Denies having any abdominal pain. Notes that recently his urine has been red too, but denies any history of hematuria. Over the last week the patient had an episode of syncope when he fell and broke his small left toe. Reports that he was walking back from the bathroom and all he remembers is that he woke up with terrible toe pain. Does note that he felt dizzy when standing up to go to the bathroom. Denies any palpitations. Had remote history of syncope about 10 years ago favored to be neurogenic/orthostatic in that time. The patient notes a loss of appetite over the last four days. He has lost about 19 lbs over the last three months. The patient is not actively suicidal.      Past Medical History    Past Medical History:   Diagnosis Date     CAD (coronary artery disease)     s/p MI     Esophagitis      h/o Acute intermittent porphyria     per patient, doubt raised by GI during 5/2013 admission     Kidney stones      PUD (peptic ulcer disease)        Past Surgical History   Past Surgical History:   Procedure Laterality Date     BRONCHOSCOPY (RIGID OR FLEXIBLE), DIAGNOSTIC N/A 1/26/2023     Procedure: BRONCHOALVEOLAR LAVAGE;  Surgeon: Rochelle Burks MD;  Location: [facility] OR     BRONCHOSCOPY FLEXIBLE AND RIGID N/A 1/26/2023    Procedure: BRONCHOSCOPY,  Airway Inspection;  Surgeon: Rochelle Burks MD;  Location: [facility] OR     ESOPHAGOSCOPY, GASTROSCOPY, DUODENOSCOPY (EGD), COMBINED  2/21/2013    Procedure: COMBINED ESOPHAGOSCOPY, GASTROSCOPY, DUODENOSCOPY (EGD), BIOPSY SINGLE OR MULTIPLE;  EGD with cold biopsy for h pylori;  Surgeon: Jen Galo MD;  Location:  GI     ESOPHAGOSCOPY, GASTROSCOPY, DUODENOSCOPY (EGD), COMBINED  3/8/2013    Procedure: COMBINED ESOPHAGOSCOPY, GASTROSCOPY, DUODENOSCOPY (EGD);  Esophagoscopy,Gastroscopy,Duodenoscopy- Room 504;  Surgeon: Jimenez King DO;  Location:  GI     LAPAROTOMY EXPLORATORY  1995       Prior to Admission Medications   Prior to Admission Medications   Prescriptions Last Dose Informant Patient Reported? Taking?   ACETAMINOPHEN-CODEINE 300-30 MG per tablet   Yes No   ANORO ELLIPTA 62.5-25 MCG/ACT oral inhaler   Yes No   LANsoprazole (PREVACID) 30 MG DR capsule   Yes No   Sig: Take 30 mg by mouth   QUEtiapine (SEROQUEL) 200 MG tablet   Yes No   QUEtiapine (SEROQUEL) 25 MG tablet   Yes No   albuterol (PROAIR HFA/PROVENTIL HFA/VENTOLIN HFA) 108 (90 Base) MCG/ACT inhaler   Yes No   Sig: Inhale 2 puffs into the lungs   bisacodyl (DULCOLAX) 5 MG EC tablet   Yes No   buPROPion (WELLBUTRIN SR) 150 MG 12 hr tablet   Yes No   chlorhexidine (PERIDEX) 0.12 % solution   Yes No   clonazePAM (KLONOPIN) 0.5 MG tablet   Yes No   lithium (ESKALITH CR/LITHOBID) 450 MG CR tablet   Yes No   nicotine (NICODERM CQ) 21 MG/24HR 24 hr patch   Yes No   sulindac (CLINORIL) 150 MG tablet   Yes No   Sig: Take 150 mg by mouth   zolpidem (AMBIEN) 10 MG tablet   Yes No      Facility-Administered Medications: None           Physical Exam   Vital Signs: Temp: 97.4  F (36.3  C) Temp src: Oral BP: 109/80 Pulse: 90   Resp: 18 SpO2: 96 % O2 Device: None (Room air)    Weight: 0 lbs 0  oz    Constitutional: awake, alert, cooperative, no apparent distress, and appears stated age  ENT: Normocephalic, without obvious abnormality, atraumatic, sinuses nontender on palpation, external ears without lesions, oral pharynx with moist mucous membranes, tonsils without erythema or exudates, gums normal and good dentition.  Respiratory: No increased work of breathing, good air exchange, clear to auscultation bilaterally, no crackles or wheezing  Cardiovascular: Normal apical impulse, regular rate and rhythm, normal S1 and S2, no S3 or S4, and no murmur noted  GI: No scars, normal bowel sounds, soft, non-distended, non-tender, no masses palpated, no hepatosplenomegally  Skin: small nummular eczema in the left thigh  Neurologic: Awake, alert, oriented to name, place and time.  Cranial nerves II-XII are grossly intact.  Motor is 5 out of 5 bilaterally.  Cerebellar finger to nose, heel to shin intact.  Sensory is intact.  Babinski down going, Romberg negative, and gait is normal.    Medical Decision Making       Please see A&P for additional details of medical decision making.      Data     I have personally reviewed the following data over the past 24 hrs:    8.4  \   13.5   / 296     141 110 (H) 13.3 /  105 (H)   4.0 18 (L) 0.82 \       ALT: 29 AST: 26 AP: 93 TBILI: <0.2   ALB: 4.2 TOT PROTEIN: 7.0 LIPASE: N/A       Trop: 7 BNP: 18       INR:  1.00 PTT:  N/A   D-dimer:  N/A Fibrinogen:  N/A

## 2023-06-07 NOTE — ED PROVIDER NOTES
"ED Provider Note  [facility]          History     Chief Complaint   Patient presents with    Chest Pain     HPI  Christiano Garcia is a 53 year old male with a past medical history significant for CAD (previously scheduled for angioplasty on 5/18 but left AMA), hemoptysis, GI bleed, personality disorder, bipolar disorder, and depression with recent hospitalization 7/2/22 - 8/8/22 at [place] for suicidal ideation (now on 6 month commitment) who presents to the emergency department for evaluation of left-sided chest pain with left-sided chest numbness that radiates into his jaw and down his left arm.     He states he only came to the emergency department because he talked to a doctor here who told him that he was dealing with potentially life-threatening situation and needed to come in.  He states he is not interested in getting a CT scan unless we are able to completely sedate him. Patient reports he has had ongoing hemoptysis which has gotten worse in the last week.  He was seen at [facility] on 5/18/2023 where he had a bronchoscopy and was advised to have an angioplasty however he left AMA.  He reports he was also supposed to have a chest CT he is unable to tolerate any procedures where he \"goes into a tube\" without being fully sedated due to his PTSD.  Patient has had recurrent hemoptysis and has undergone 3 bronchoscopies with no clear source of bleed (9/22, 01/23. 5/18/23)     Patient reports he had a syncopal episode where he passed out and broke his toe last week.  He was not evaluated for the syncopal episode after this.    CP located left chest, which radiates to left jaw and down left arm. Associated w/ a numbness sensation to the left chest. Had some shortness of breath earlier and believes coughed some blood. No other particular breathing sx's. No new/coinciding neck or back discomfort. No numbness, tingling, weakness. No abdominal, GI or  sx's. No blood in stool/urine. " Reports significant PTSD hx, but no current SI or HI. No recent substances. No other new symptoms or complaints at this time. Full ROS completed w/o additional findings.       CT 1/26/23   IMPRESSION:   1. Dependent consolidative/atelectatic changes bilaterally. Appearance favors combination lipoid pneumonia, aspiration of fatty contents or infection or in combination with enlarged intrafissural lymphadenopathy. Differential also includes bibasilar alveolar hemorrhage.    Past Medical History  Past Medical History:   Diagnosis Date    CAD (coronary artery disease)     s/p MI    Esophagitis     h/o Acute intermittent porphyria     per patient, doubt raised by GI during 5/2013 admission    Kidney stones     PUD (peptic ulcer disease)      Past Surgical History:   Procedure Laterality Date    BRONCHOSCOPY (RIGID OR FLEXIBLE), DIAGNOSTIC N/A 1/26/2023    Procedure: BRONCHOALVEOLAR LAVAGE;  Surgeon: Rochelle Burks MD;  Location: [facility] OR    BRONCHOSCOPY FLEXIBLE AND RIGID N/A 1/26/2023    Procedure: BRONCHOSCOPY,  Airway Inspection;  Surgeon: Rochelle Burks MD;  Location: [facility] OR    ESOPHAGOSCOPY, GASTROSCOPY, DUODENOSCOPY (EGD), COMBINED  2/21/2013    Procedure: COMBINED ESOPHAGOSCOPY, GASTROSCOPY, DUODENOSCOPY (EGD), BIOPSY SINGLE OR MULTIPLE;  EGD with cold biopsy for h pylori;  Surgeon: Jen Galo MD;  Location:  GI    ESOPHAGOSCOPY, GASTROSCOPY, DUODENOSCOPY (EGD), COMBINED  3/8/2013    Procedure: COMBINED ESOPHAGOSCOPY, GASTROSCOPY, DUODENOSCOPY (EGD);  Esophagoscopy,Gastroscopy,Duodenoscopy- Room 504;  Surgeon: Jimenez King DO;  Location:  GI    LAPAROTOMY EXPLORATORY  1995     ACETAMINOPHEN-CODEINE 300-30 MG per tablet  albuterol (PROAIR HFA/PROVENTIL HFA/VENTOLIN HFA) 108 (90 Base) MCG/ACT inhaler  ANORO ELLIPTA 62.5-25 MCG/ACT oral inhaler  bisacodyl (DULCOLAX) 5 MG EC tablet  buPROPion (WELLBUTRIN SR) 150 MG 12 hr tablet  chlorhexidine (PERIDEX) 0.12 % solution  clonazePAM (KLONOPIN) 0.5 MG  "tablet  LANsoprazole (PREVACID) 30 MG DR capsule  lithium (ESKALITH CR/LITHOBID) 450 MG CR tablet  nicotine (NICODERM CQ) 21 MG/24HR 24 hr patch  QUEtiapine (SEROQUEL) 200 MG tablet  QUEtiapine (SEROQUEL) 25 MG tablet  sulindac (CLINORIL) 150 MG tablet  zolpidem (AMBIEN) 10 MG tablet      No Known Allergies  Family History  No family history on file.  Social History   Social History     Tobacco Use    Smoking status: Some Days     Packs/day: 0.50     Years: 5.00     Pack years: 2.50     Types: Cigarettes    Smokeless tobacco: Never    Tobacco comments:     patient smokes \"to raise his blood pressure\" but very rarely when he is not hospitalized   Substance Use Topics    Alcohol use: No    Drug use: No      Past medical history, past surgical history, medications, allergies, family history, and social history were reviewed with the patient. No additional pertinent items.      A complete review of systems was performed with pertinent positives and negatives noted in the HPI, and all other systems negative.    Physical Exam   BP: (!) 140/88  Pulse: 88  Temp: 98  F (36.7  C)  Resp: 16  Height: 190.5 cm (6' 3\")  SpO2: 94 %  Physical Exam  Vitals and nursing note reviewed.   Constitutional:       General: He is not in acute distress.     Appearance: He is not ill-appearing, toxic-appearing or diaphoretic.   HENT:      Head: Normocephalic and atraumatic.   Cardiovascular:      Rate and Rhythm: Normal rate and regular rhythm.      Heart sounds: Normal heart sounds.   Pulmonary:      Effort: Pulmonary effort is normal.      Breath sounds: Normal breath sounds.   Abdominal:      General: Abdomen is flat.      Palpations: Abdomen is soft.      Tenderness: There is no abdominal tenderness.   Musculoskeletal:         General: Normal range of motion.   Skin:     General: Skin is warm and dry.      Capillary Refill: Capillary refill takes less than 2 seconds.   Neurological:      General: No focal deficit present.      Mental " Status: He is alert and oriented to person, place, and time.   Psychiatric:         Mood and Affect: Mood normal.         Behavior: Behavior normal.     Describes PTSD Hx, but no SI or HI.     ED Course, Procedures, & Data        ED Course Selections:        EKG Interpretation:      Interpreted by SO Beck CNP / Reviewed by Pam Maher MD  Time reviewed: 2020  Symptoms at time of EKG: left sided chest pain, numbness radiating into jaw and down left arm   Rhythm: normal sinus   Rate: normal  Axis: normal  Ectopy: none  Conduction: normal   ST Segments/ T Waves: No ST-T wave changes  Comparison to prior: vs 5/12/22; looks generally similar to prior  Clinical Impression: Sinus, generally unchanged from prior         Mental Health Risk Assessment      PSS-3      Date and Time Over the past 2 weeks have you felt down, depressed, or hopeless? Over the past 2 weeks have you had thoughts of killing yourself? Have you ever attempted to kill yourself? When did this last happen? User   06/06/23 1935 no no yes more than 6 months ago JR                  Item Assessment   Suicidal Ideation Suicidal thoughts   Plan none   Intent none   Suicidal or self-harm behaviors Held a gun, but changed your mind or it was taken from you - this occurred 9 months ago, none recent   Risk Factors Previous psychiatric diagnosis and treatments   Protective Factors Identified reasons for living and Supportive social network of family and/or friends     No current SI or HI at this time.     XR Chest 2 Views   Preliminary Result   RESIDENT PRELIMINARY INTERPRETATION   IMPRESSION:    No acute airspace disease.               Assessment & Plan    Christiano Garcia is a 53 year old male with a past medical history significant for personality disorder, bipolar disorder, and depression with recent hospitalization 7/2/22 - 8/8/22 at [place] for suicidal ideation (now on 6 month commitment) who presents to the emergency department for evaluation of  left-sided chest pain with left-sided chest numbness that radiates into his jaw and down his left arm.  Upon examination patient is nontoxic-appearing and in no acute distress.  We will obtain EKG and comprehensive labs, as well as send patient for chest x-ray.      I reviewed patient's EKG which showed normal sinus rhythm with no ST or T wave changes, no ST elevation, no QT/QTc prolongation.  Troponin was negative.  No leukocytosis, no anemia, no electrolyte abnormalities.  Normal renal function, no transaminitis.  Bicarb low at 18 without an anion gap.     I reviewed patient's chest x-ray which was significant for no cardiomegaly, no mediastinal widening, no pulmonary opacities or infiltrates, no pleural effusions.      Patient initially mildly hypertensive at 140/88, however has remained normotensive during his time in the emergency department, last 113/86.  He is not tachycardic, he is afebrile.  Oxygen saturation 94 to 96% on room air.  He does not appear dyspneic or in respiratory distress.    I have reviewed the nursing notes. I have reviewed the findings, diagnosis, plan and need for follow up with the patient.    SO Beck [facility] EMERGENCY DEPARTMENT  6/6/2023    --    ED Attending Physician Attestation    I Pam Maher MD, cared for this patient with the Advanced Practice Provider (JOAQUÍN). I have performed a history and physical examination of the patient independent of the JOAQUÍN. I reviewed the JOAQUÍN's documentation above and agree with the documented findings and plan of care, except as edited above, and added my own physical exam and MDM/Assessment & plan as described below. I personally provided a substantive portion of the care for this patient, including the complete Medical Decision Making. Please see the JOAQUÍN's documentation for full details.    IMPRESSION:   53 year old male w/ PMH notable for CAD, hemoptysis, GI bleed, personality disorder, bipolar disorder, and  depression, hx PTSD w/o any current SI or HI, presenting today w/ chest pain radiating to jaw/face and arm, hemoptysis and shortness of breath (the latter two of which was isolated and has resolved).    Clinically, patient appears nontoxic, NAD. Otherwise on examination, no acute findings    Ddx includes, but not limited to, PE, pulmonary hemorrhage, infection, malignancy, ACS, et al.     PLAN:   - ECG, Labs, CXR  - Pt refusing CT or other more advanced imaging/testing, citing his PTSD and says would need full sedation for such, despite review of the potential risks or missed/delayed diagnoses (and how could be life threatening or cause permanent disability).       Results/reports reviewed w/ patient who expresses understanding of findings and F/U recommendations.    INTERVENTIONS:   - IV ativan    RE-EVALUATION:  - The patient is still has shortness of breath/chest discomfort, but not having any active hemoptysis here.   - Pt otherwise continues to do well here in the ED, no acute issues or apparent concerning changes in vitals or clinical appearance.    DISCUSSIONS:  - w/ IM: Reviewed patient/presentation, current state of workup/any pending studies. Reviewed limitations in workup/management given patient's severe PTSD. They will admit for further evaluation/management, F/U pending studies as needed, coordinate w/ consulting services as needed. No additional requests/recommendations for workup/management from their stand point for in the ED at this time.   - w/ Patient: I have reviewed the available findings, plan with the patient. He is still not wanting CT/further testing w/o sedation, but is agreeable to admission and further workup/management. He expressed understanding and agreement with this plan. All questions answered to the best of our ability at this time.     DISPOSITION/PLANNING:  IMPRESSION:   - Chest pain radiating to arm/jaw  - Hemoptysis  - Severe PTSD, w/o any SI or HI  DISPOSITION:  - Admit to IM  for further evaluation/management  OTHER RECOMMENDATIONS:   - Additional imaging, bronch, Pulm consult  - Consider Pysch consult      Critical Care & Procedures  No Critical care or procedures        Medical Decision Making  The patient's presentation was of high complexity (an acute health issue posing potential threat to life or bodily function).    The patient's evaluation involved:  review of external note(s) from 3+ sources (see separate area of note for details) ordering and/or review of 3+ test(s) in this encounter (see separate area of note for details) strong consideration of a test (see separate area of note for details, pt refused secondary to severe PTSD) that was ultimately deferred discussion of management or test interpretation with another health professional (see separate area of note for details)    The patient's management necessitated high risk (a decision regarding hospitalization) and further care after sign-out to admitting IM and overnight EM physician (see their note for further management).          Pam Maher MD  Emergency Medicine        Pam Maher MD  06/08/23 [id number]       Pam Maher MD  10/05/23 [id number]

## 2023-06-07 NOTE — ED NOTES
Patient leaving AMA. Patient refused to take our PIV. Security present. Patient became verbally and physically aggressive with staff. Patient escorted out to the waiting room. Inpatient team MD came down to talk to patient. Confirmed with the patient that he is not suicidal and is free to leave.

## 2023-06-07 NOTE — ED TRIAGE NOTES
Chest pain radiates to L face and L arm, L arm numbness/tingling, SOB, hemoptysis. Was scheduled for angioplasty 5/18 but left AMA.     Triage Assessment     Row Name 06/06/23 1932       Triage Assessment (Adult)    Airway WDL WDL       Respiratory WDL    Respiratory WDL rhythm/pattern    Rhythm/Pattern, Respiratory shortness of breath       Skin Circulation/Temperature WDL    Skin Circulation/Temperature WDL WDL       Cardiac WDL    Cardiac WDL chest pain       Peripheral/Neurovascular WDL    Peripheral Neurovascular WDL X  Paresthesia in L arm/hand       Cognitive/Neuro/Behavioral WDL    Cognitive/Neuro/Behavioral WDL WDL

## 2023-06-07 NOTE — UTILIZATION REVIEW
"  Admission Status; Secondary Review Determination         Under the authority of the Utilization Management Committee, the utilization review process indicated a secondary review on the above patient.  The review outcome is based on review of the medical records, discussions with staff, and applying clinical experience noted on the date of the review.          (x) Observation Status Appropriate - This patient does not meet hospital inpatient criteria and is placed in observation status. If this patient's primary payer is Medicare and was admitted as an inpatient, Condition Code 44 should be used and patient status changed to \"observation\".     RATIONALE FOR DETERMINATION     The severity of illness, intensity of service provided, expected LOS and risk for adverse outcome make the care appropriate for further observation; however, doesn't meet criteria for hospital inpatient admission.   notified of this determination.    The patient is a 53-year-old male admitted on 6/6/2023.  The patient presented to the ED with hemoptysis and chest pain.  Patient had no abnormalities in the EKG with negative troponins.  Patient did have hypertension.  He did have a normal stress exercise test in 2018 he has a smoking history along with a past medical history of suicidal attempt.  Patient also had syncope 1 week prior to admission and hematochezia.  White count is normal and hemoglobin is 12.8 today.  proBNP is normal and troponins are negative.  According to the most recent note in the chart, the patient decided to leave AMA and understood the risks of undiagnosed problem.  Based on 1 midnight stay, recommend switching from inpatient to observation.  Note was sent to .      The information on this document is developed by the utilization review team in order for the business office to ensure compliance.  This only denotes the appropriateness of proper admission status and does not reflect the quality of care " rendered.         The definitions of Inpatient Status and Observation Status used in making the determination above are those provided in the CMS Coverage Manual, Chapter 1 and Chapter 6, section 70.4.      Sincerely,     Barry Barrera MD  Physician Advisor  Utilization Review/ Case Management  [facility].

## 2023-07-13 ENCOUNTER — HOSPITAL ENCOUNTER (EMERGENCY)
Facility: CLINIC | Age: 53
Discharge: HOME | End: 2023-07-13
Attending: STUDENT IN AN ORGANIZED HEALTH CARE EDUCATION/TRAINING PROGRAM | Admitting: STUDENT IN AN ORGANIZED HEALTH CARE EDUCATION/TRAINING PROGRAM
Payer: COMMERCIAL

## 2023-07-13 ENCOUNTER — APPOINTMENT (OUTPATIENT)
Dept: GENERAL RADIOLOGY | Facility: CLINIC | Age: 53
End: 2023-07-13
Attending: NURSE PRACTITIONER
Payer: COMMERCIAL

## 2023-07-13 VITALS
DIASTOLIC BLOOD PRESSURE: 58 MMHG | HEART RATE: 90 BPM | OXYGEN SATURATION: 97 % | TEMPERATURE: 98.2 F | SYSTOLIC BLOOD PRESSURE: 105 MMHG | RESPIRATION RATE: 17 BRPM

## 2023-07-13 DIAGNOSIS — R07.9 CHEST PAIN: ICD-10-CM

## 2023-07-13 DIAGNOSIS — R07.89 OTHER CHEST PAIN: Primary | ICD-10-CM

## 2023-07-13 DIAGNOSIS — R06.02 SHORTNESS OF BREATH: ICD-10-CM

## 2023-07-13 LAB
ALBUMIN SERPL BCG-MCNC: 4.2 G/DL (ref 3.5–5.2)
ALP SERPL-CCNC: 86 U/L (ref 40–129)
ALT SERPL W P-5'-P-CCNC: 25 U/L (ref 0–70)
ANION GAP SERPL CALCULATED.3IONS-SCNC: 13 MMOL/L (ref 7–15)
AST SERPL W P-5'-P-CCNC: 33 U/L (ref 0–45)
ATRIAL RATE - MUSE: 79 BPM
BASOPHILS # BLD AUTO: 0.1 10E3/UL (ref 0–0.2)
BASOPHILS NFR BLD AUTO: 1 %
BILIRUB SERPL-MCNC: 0.2 MG/DL
BUN SERPL-MCNC: 12.2 MG/DL (ref 6–20)
CALCIUM SERPL-MCNC: 8.9 MG/DL (ref 8.6–10)
CHLORIDE SERPL-SCNC: 107 MMOL/L (ref 98–107)
CREAT SERPL-MCNC: 0.82 MG/DL (ref 0.67–1.17)
D DIMER PPP FEU-MCNC: 0.31 UG/ML FEU (ref 0–0.5)
DEPRECATED HCO3 PLAS-SCNC: 19 MMOL/L (ref 22–29)
DIASTOLIC BLOOD PRESSURE - MUSE: NORMAL MMHG
EOSINOPHIL # BLD AUTO: 0.5 10E3/UL (ref 0–0.7)
EOSINOPHIL NFR BLD AUTO: 5 %
ERYTHROCYTE [DISTWIDTH] IN BLOOD BY AUTOMATED COUNT: 13.4 % (ref 10–15)
GFR SERPL CREATININE-BSD FRML MDRD: >90 ML/MIN/1.73M2
GLUCOSE SERPL-MCNC: 84 MG/DL (ref 70–99)
HCT VFR BLD AUTO: 40.4 % (ref 40–53)
HGB BLD-MCNC: 13.3 G/DL (ref 13.3–17.7)
HOLD SPECIMEN: NORMAL
IMM GRANULOCYTES # BLD: 0.1 10E3/UL
IMM GRANULOCYTES NFR BLD: 1 %
INR PPP: 0.99 (ref 0.85–1.15)
INTERPRETATION ECG - MUSE: NORMAL
LYMPHOCYTES # BLD AUTO: 3.1 10E3/UL (ref 0.8–5.3)
LYMPHOCYTES NFR BLD AUTO: 31 %
MCH RBC QN AUTO: 29 PG (ref 26.5–33)
MCHC RBC AUTO-ENTMCNC: 32.9 G/DL (ref 31.5–36.5)
MCV RBC AUTO: 88 FL (ref 78–100)
MONOCYTES # BLD AUTO: 0.9 10E3/UL (ref 0–1.3)
MONOCYTES NFR BLD AUTO: 9 %
NEUTROPHILS # BLD AUTO: 5.6 10E3/UL (ref 1.6–8.3)
NEUTROPHILS NFR BLD AUTO: 53 %
NRBC # BLD AUTO: 0 10E3/UL
NRBC BLD AUTO-RTO: 0 /100
P AXIS - MUSE: 34 DEGREES
PLATELET # BLD AUTO: 316 10E3/UL (ref 150–450)
POTASSIUM SERPL-SCNC: 4.2 MMOL/L (ref 3.4–5.3)
PR INTERVAL - MUSE: 166 MS
PROT SERPL-MCNC: 6.7 G/DL (ref 6.4–8.3)
QRS DURATION - MUSE: 84 MS
QT - MUSE: 378 MS
QTC - MUSE: 433 MS
R AXIS - MUSE: -18 DEGREES
RBC # BLD AUTO: 4.59 10E6/UL (ref 4.4–5.9)
SODIUM SERPL-SCNC: 139 MMOL/L (ref 136–145)
SYSTOLIC BLOOD PRESSURE - MUSE: NORMAL MMHG
T AXIS - MUSE: 38 DEGREES
TROPONIN T SERPL HS-MCNC: 8 NG/L
TROPONIN T SERPL HS-MCNC: 9 NG/L
VENTRICULAR RATE- MUSE: 79 BPM
WBC # BLD AUTO: 10.2 10E3/UL (ref 4–11)

## 2023-07-13 PROCEDURE — 96374 THER/PROPH/DIAG INJ IV PUSH: CPT

## 2023-07-13 PROCEDURE — 96361 HYDRATE IV INFUSION ADD-ON: CPT

## 2023-07-13 PROCEDURE — 258N000003 HC RX IP 258 OP 636: Performed by: NURSE PRACTITIONER

## 2023-07-13 PROCEDURE — 71046 X-RAY EXAM CHEST 2 VIEWS: CPT | Mod: 26 | Performed by: RADIOLOGY

## 2023-07-13 PROCEDURE — 99285 EMERGENCY DEPT VISIT HI MDM: CPT | Mod: 25

## 2023-07-13 PROCEDURE — 99285 EMERGENCY DEPT VISIT HI MDM: CPT | Mod: 25 | Performed by: STUDENT IN AN ORGANIZED HEALTH CARE EDUCATION/TRAINING PROGRAM

## 2023-07-13 PROCEDURE — 71046 X-RAY EXAM CHEST 2 VIEWS: CPT

## 2023-07-13 PROCEDURE — 93010 ELECTROCARDIOGRAM REPORT: CPT | Performed by: STUDENT IN AN ORGANIZED HEALTH CARE EDUCATION/TRAINING PROGRAM

## 2023-07-13 PROCEDURE — 84484 ASSAY OF TROPONIN QUANT: CPT | Performed by: NURSE PRACTITIONER

## 2023-07-13 PROCEDURE — 36415 COLL VENOUS BLD VENIPUNCTURE: CPT | Performed by: NURSE PRACTITIONER

## 2023-07-13 PROCEDURE — 94640 AIRWAY INHALATION TREATMENT: CPT

## 2023-07-13 PROCEDURE — 84484 ASSAY OF TROPONIN QUANT: CPT | Performed by: STUDENT IN AN ORGANIZED HEALTH CARE EDUCATION/TRAINING PROGRAM

## 2023-07-13 PROCEDURE — 85379 FIBRIN DEGRADATION QUANT: CPT | Performed by: NURSE PRACTITIONER

## 2023-07-13 PROCEDURE — 250N000009 HC RX 250: Performed by: NURSE PRACTITIONER

## 2023-07-13 PROCEDURE — 250N000011 HC RX IP 250 OP 636: Performed by: NURSE PRACTITIONER

## 2023-07-13 PROCEDURE — 85025 COMPLETE CBC W/AUTO DIFF WBC: CPT | Performed by: NURSE PRACTITIONER

## 2023-07-13 PROCEDURE — 80053 COMPREHEN METABOLIC PANEL: CPT | Performed by: NURSE PRACTITIONER

## 2023-07-13 PROCEDURE — 36415 COLL VENOUS BLD VENIPUNCTURE: CPT | Performed by: EMERGENCY MEDICINE

## 2023-07-13 PROCEDURE — 93005 ELECTROCARDIOGRAM TRACING: CPT

## 2023-07-13 PROCEDURE — 85610 PROTHROMBIN TIME: CPT | Performed by: NURSE PRACTITIONER

## 2023-07-13 RX ORDER — IPRATROPIUM BROMIDE AND ALBUTEROL SULFATE 2.5; .5 MG/3ML; MG/3ML
3 SOLUTION RESPIRATORY (INHALATION) ONCE
Status: COMPLETED | OUTPATIENT
Start: 2023-07-13 | End: 2023-07-13

## 2023-07-13 RX ORDER — NITROGLYCERIN 0.4 MG/1
0.4 TABLET SUBLINGUAL EVERY 5 MIN PRN
Status: DISCONTINUED | OUTPATIENT
Start: 2023-07-13 | End: 2023-07-13 | Stop reason: HOSPADM

## 2023-07-13 RX ADMIN — IPRATROPIUM BROMIDE AND ALBUTEROL SULFATE 3 ML: .5; 3 SOLUTION RESPIRATORY (INHALATION) at 19:54

## 2023-07-13 RX ADMIN — MIDAZOLAM 1 MG: 1 INJECTION INTRAMUSCULAR; INTRAVENOUS at 19:55

## 2023-07-13 RX ADMIN — SODIUM CHLORIDE, POTASSIUM CHLORIDE, SODIUM LACTATE AND CALCIUM CHLORIDE 1000 ML: 600; 310; 30; 20 INJECTION, SOLUTION INTRAVENOUS at 19:52

## 2023-07-13 ASSESSMENT — ACTIVITIES OF DAILY LIVING (ADL)
ADLS_ACUITY_SCORE: 37
ADLS_ACUITY_SCORE: 37

## 2023-07-13 NOTE — ED NOTES
Bed: ED16  Expected date:   Expected time:   Means of arrival:   Comments:  [facility]  53 m chest pain given versed

## 2023-07-13 NOTE — ED PROVIDER NOTES
[facility] EMERGENCY DEPARTMENT ([facility])    7/13/23      History     Chief Complaint   Patient presents with     Chest Pain     HPI  Christiano Garcia is a 53 year old male who with PMH of acute intermittent porphyria, CAD, PUD, kidney stones, esophagitis, SIRS, and severe PTSD who presents to the ED via EMS with chest pain.  He reports today the nurse at the facility where he lives found him down on the ground in his room, at the time he he was very hypertensive reportedly 200s/100s.  He was complaining of pain and shortness of breath.  He was uncertain why he was on the ground. Reports ongoing chest pain and hemoptysis since his last hospitalization 1 month ago.  He reports chest pain is in the left side of his chest with radiation to his jaw and down his left arm with tingling in his left hand.  States his left chest feels like someone is sitting on him or crushing his left chest.  Its not reproducible.  Patient received aspirin, nitroglycerin and Valium in route via EMS.  He states the nitroglycerin lessened his chest pain somewhat.  Patient reports he has been taking his inhalers and other medications as prescribed.  He has a cardiology appointment tomorrow and was reluctant to come to the emergency department because he did not want to wait and be uncomfortable like he was during his last hospitalization.    During last hospitalization it was recommended that patient receive further work-up including potential CT imaging, possible angiogram, bronchoscopy, echocardiogram or stress echo.  Patient left AMA because fortunately there were no beds up on any of the hospital units and he ended up boarding overnight in the emergency department, he left because he was sick of waiting in the emergency department feeling like nothing was being done.  He presented later in the day to an outside emergency department where his troponin was negative and he was discharged home with referral back to his primary care  provider for ongoing workup.    Past Medical History  Past Medical History:   Diagnosis Date     CAD (coronary artery disease)     s/p MI     Esophagitis      h/o Acute intermittent porphyria     per patient, doubt raised by GI during 5/2013 admission     Kidney stones      PUD (peptic ulcer disease)      Past Surgical History:   Procedure Laterality Date     BRONCHOSCOPY (RIGID OR FLEXIBLE), DIAGNOSTIC N/A 1/26/2023    Procedure: BRONCHOALVEOLAR LAVAGE;  Surgeon: Rochelle Burks MD;  Location: [facility] OR     BRONCHOSCOPY FLEXIBLE AND RIGID N/A 1/26/2023    Procedure: BRONCHOSCOPY,  Airway Inspection;  Surgeon: Rochelle Burks MD;  Location: [facility] OR     ESOPHAGOSCOPY, GASTROSCOPY, DUODENOSCOPY (EGD), COMBINED  2/21/2013    Procedure: COMBINED ESOPHAGOSCOPY, GASTROSCOPY, DUODENOSCOPY (EGD), BIOPSY SINGLE OR MULTIPLE;  EGD with cold biopsy for h pylori;  Surgeon: Jen Galo MD;  Location:  GI     ESOPHAGOSCOPY, GASTROSCOPY, DUODENOSCOPY (EGD), COMBINED  3/8/2013    Procedure: COMBINED ESOPHAGOSCOPY, GASTROSCOPY, DUODENOSCOPY (EGD);  Esophagoscopy,Gastroscopy,Duodenoscopy- Room 504;  Surgeon: Jimenez King DO;  Location: [facility] GI     LAPAROTOMY EXPLORATORY  1995     ACETAMINOPHEN-CODEINE 300-30 MG per tablet  albuterol (PROAIR HFA/PROVENTIL HFA/VENTOLIN HFA) 108 (90 Base) MCG/ACT inhaler  ANORO ELLIPTA 62.5-25 MCG/ACT oral inhaler  bisacodyl (DULCOLAX) 5 MG EC tablet  buPROPion (WELLBUTRIN SR) 150 MG 12 hr tablet  chlorhexidine (PERIDEX) 0.12 % solution  clonazePAM (KLONOPIN) 0.5 MG tablet  LANsoprazole (PREVACID) 30 MG DR capsule  lithium (ESKALITH CR/LITHOBID) 450 MG CR tablet  nicotine (NICODERM CQ) 21 MG/24HR 24 hr patch  QUEtiapine (SEROQUEL) 200 MG tablet  QUEtiapine (SEROQUEL) 25 MG tablet  sulindac (CLINORIL) 150 MG tablet  zolpidem (AMBIEN) 10 MG tablet      No Known Allergies  Family History  History reviewed. No pertinent family history.  Social History   Social History     Tobacco Use     Smoking  "status: Some Days     Packs/day: 0.50     Years: 5.00     Pack years: 2.50     Types: Cigarettes     Smokeless tobacco: Never     Tobacco comments:     patient smokes \"to raise his blood pressure\" but very rarely when he is not hospitalized   Substance Use Topics     Alcohol use: No     Drug use: No         A medically appropriate review of systems was performed with pertinent positives and negatives noted in the HPI, and all other systems negative.    Physical Exam   BP: 119/77  Pulse: 84  Temp: 98.2  F (36.8  C)  Resp: 17  SpO2: 98 %  Physical Exam  Vitals and nursing note reviewed.   Constitutional:       Appearance: He is well-developed.   HENT:      Head: Normocephalic and atraumatic.   Cardiovascular:      Rate and Rhythm: Normal rate and regular rhythm.      Heart sounds: Normal heart sounds.   Pulmonary:      Effort: Pulmonary effort is normal.      Breath sounds: Normal breath sounds.   Chest:      Chest wall: No tenderness.   Musculoskeletal:         General: Normal range of motion.      Right lower leg: No tenderness.      Left lower leg: No tenderness.   Skin:     General: Skin is warm and dry.      Capillary Refill: Capillary refill takes less than 2 seconds.   Neurological:      General: No focal deficit present.      Mental Status: He is alert and oriented to person, place, and time.   Psychiatric:         Mood and Affect: Mood normal.         Behavior: Behavior normal.         ED Course, Procedures, & Data      Procedures       ED Course Selections:        EKG Interpretation:      Interpreted by SO Beck CNP  Time reviewed: 1911  Symptoms at time of EKG: left sided chest pain with radiation to jaw and down left arm   Rhythm: normal sinus   Rate: normal  Axis: normal  Ectopy: none  Conduction: normal  ST Segments/ T Waves: No ST-T wave changes  Q Waves: none  Comparison to prior: No old EKG available    Clinical Impression: normal EKG    Results for orders placed or performed during the " hospital encounter of 07/13/23   XR Chest 2 Views     Status: None    Narrative    EXAM: XR CHEST 2 VIEWS  7/13/2023 7:36 PM     HISTORY:  chest pain       COMPARISON:  Chest radiograph 6/6/2023    FINDINGS:   Trachea is midline. Cardiac silhouette is within normal limits.  Pulmonary vasculature is distinct. No focal airspace opacity, pleural  effusion, pneumothorax. Mild streaky perihilar and bibasilar  opacities. No acute osseous abnormality. Visualized soft tissues and  upper abdomen are unremarkable.       Impression    IMPRESSION:   Mild streaky perihilar and bibasilar opacities, likely representing  atelectasis and/or edema. No focal consolidation.     I have personally reviewed the examination and initial interpretation  and I agree with the findings.    THALIA BENITEZ MD         SYSTEM ID:  [id number]   [place] Draw     Status: None    Narrative    The following orders were created for panel order [place] Draw.  Procedure                               Abnormality         Status                     ---------                               -----------         ------                     Extra Blue Top Tube[[id number]]                              Final result               Extra Red Top Tube[[id number]]                               Final result               Extra Green Top (Lithium...[[id number]]                      Final result               Extra Purple Top Tube[[id number]]                            Final result                 Please view results for these tests on the individual orders.   Extra Blue Top Tube     Status: None   Result Value Ref Range    Hold Specimen JIC    Extra Red Top Tube     Status: None   Result Value Ref Range    Hold Specimen JIC    Extra Green Top (Lithium Heparin) Tube     Status: None   Result Value Ref Range    Hold Specimen JIC    Extra Purple Top Tube     Status: None   Result Value Ref Range    Hold Specimen JIC    Comprehensive metabolic panel     Status: Abnormal   Result  Value Ref Range    Sodium 139 136 - 145 mmol/L    Potassium 4.2 3.4 - 5.3 mmol/L    Chloride 107 98 - 107 mmol/L    Carbon Dioxide (CO2) 19 (L) 22 - 29 mmol/L    Anion Gap 13 7 - 15 mmol/L    Urea Nitrogen 12.2 6.0 - 20.0 mg/dL    Creatinine 0.82 0.67 - 1.17 mg/dL    Calcium 8.9 8.6 - 10.0 mg/dL    Glucose 84 70 - 99 mg/dL    Alkaline Phosphatase 86 40 - 129 U/L    AST 33 0 - 45 U/L    ALT 25 0 - 70 U/L    Protein Total 6.7 6.4 - 8.3 g/dL    Albumin 4.2 3.5 - 5.2 g/dL    Bilirubin Total 0.2 <=1.2 mg/dL    GFR Estimate >90 >60 mL/min/1.73m2   D dimer quantitative     Status: Normal   Result Value Ref Range    D-Dimer Quantitative 0.31 0.00 - 0.50 ug/mL FEU    Narrative    This D-dimer assay is intended for use in conjunction with a clinical pretest probability assessment model to exclude pulmonary embolism (PE) and deep venous thrombosis (DVT) in outpatients suspected of PE or DVT. The cut-off value is 0.50 ug/mL FEU.   Troponin T, High Sensitivity     Status: Normal   Result Value Ref Range    Troponin T, High Sensitivity 9 <=22 ng/L   INR     Status: Normal   Result Value Ref Range    INR 0.99 0.85 - 1.15   CBC with platelets and differential     Status: None   Result Value Ref Range    WBC Count 10.2 4.0 - 11.0 10e3/uL    RBC Count 4.59 4.40 - 5.90 10e6/uL    Hemoglobin 13.3 13.3 - 17.7 g/dL    Hematocrit 40.4 40.0 - 53.0 %    MCV 88 78 - 100 fL    MCH 29.0 26.5 - 33.0 pg    MCHC 32.9 31.5 - 36.5 g/dL    RDW 13.4 10.0 - 15.0 %    Platelet Count 316 150 - 450 10e3/uL    % Neutrophils 53 %    % Lymphocytes 31 %    % Monocytes 9 %    % Eosinophils 5 %    % Basophils 1 %    % Immature Granulocytes 1 %    NRBCs per 100 WBC 0 <1 /100    Absolute Neutrophils 5.6 1.6 - 8.3 10e3/uL    Absolute Lymphocytes 3.1 0.8 - 5.3 10e3/uL    Absolute Monocytes 0.9 0.0 - 1.3 10e3/uL    Absolute Eosinophils 0.5 0.0 - 0.7 10e3/uL    Absolute Basophils 0.1 0.0 - 0.2 10e3/uL    Absolute Immature Granulocytes 0.1 <=0.4 10e3/uL    Absolute  NRBCs 0.0 10e3/uL   EKG 12-lead, tracing only     Status: None (Preliminary result)   Result Value Ref Range    Systolic Blood Pressure  mmHg    Diastolic Blood Pressure  mmHg    Ventricular Rate 79 BPM    Atrial Rate 79 BPM    MA Interval 166 ms    QRS Duration 84 ms     ms    QTc 433 ms    P Axis 34 degrees    R AXIS -18 degrees    T Axis 38 degrees    Interpretation ECG Sinus rhythm  Normal ECG      CBC with platelets differential     Status: None    Narrative    The following orders were created for panel order CBC with platelets differential.  Procedure                               Abnormality         Status                     ---------                               -----------         ------                     CBC with platelets and d...[[id number]]                      Final result                 Please view results for these tests on the individual orders.     Medications   nitroGLYcerin (NITROSTAT) sublingual tablet 0.4 mg (has no administration in time range)   lactated ringers BOLUS 1,000 mL (0 mLs Intravenous Stopped 7/13/23 2153)   ipratropium - albuterol 0.5 mg/2.5 mg/3 mL (DUONEB) neb solution 3 mL (3 mLs Nebulization $Given 7/13/23 1954)   midazolam (VERSED) injection 1 mg (1 mg Intravenous $Given 7/13/23 1955)     Labs Ordered and Resulted from Time of ED Arrival to Time of ED Departure   COMPREHENSIVE METABOLIC PANEL - Abnormal       Result Value    Sodium 139      Potassium 4.2      Chloride 107      Carbon Dioxide (CO2) 19 (*)     Anion Gap 13      Urea Nitrogen 12.2      Creatinine 0.82      Calcium 8.9      Glucose 84      Alkaline Phosphatase 86      AST 33      ALT 25      Protein Total 6.7      Albumin 4.2      Bilirubin Total 0.2      GFR Estimate >90     D DIMER QUANTITATIVE - Normal    D-Dimer Quantitative 0.31     TROPONIN T, HIGH SENSITIVITY - Normal    Troponin T, High Sensitivity 9     INR - Normal    INR 0.99     CBC WITH PLATELETS AND DIFFERENTIAL    WBC Count 10.2      RBC  Count 4.59      Hemoglobin 13.3      Hematocrit 40.4      MCV 88      MCH 29.0      MCHC 32.9      RDW 13.4      Platelet Count 316      % Neutrophils 53      % Lymphocytes 31      % Monocytes 9      % Eosinophils 5      % Basophils 1      % Immature Granulocytes 1      NRBCs per 100 WBC 0      Absolute Neutrophils 5.6      Absolute Lymphocytes 3.1      Absolute Monocytes 0.9      Absolute Eosinophils 0.5      Absolute Basophils 0.1      Absolute Immature Granulocytes 0.1      Absolute NRBCs 0.0     TROPONIN T, HIGH SENSITIVITY     XR Chest 2 Views   Final Result   IMPRESSION:    Mild streaky perihilar and bibasilar opacities, likely representing   atelectasis and/or edema. No focal consolidation.       I have personally reviewed the examination and initial interpretation   and I agree with the findings.      THALIA BENITEZ MD            SYSTEM ID:  [id number]             Critical care was not performed.     Medical Decision Making  The patient's presentation was of high complexity (an acute health issue posing potential threat to life or bodily function).    The patient's evaluation involved:  review of external note(s) from 3+ sources (see separate area of note for details)  ordering and/or review of 3+ test(s) in this encounter (see separate area of note for details)  review of 3+ test result(s) ordered prior to this encounter (see separate area of note for details)  independent interpretation of testing performed by another health professional (chest xray)    The patient's management necessitated moderate risk (prescription drug management including medications given in the ED).      Assessment & Plan    Christiano Garcia is a 53 year old male who with PMH of acute intermittent porphyria, CAD, PUD, kidney stones, esophagitis, SIRS, and severe PTSD who presents to the ED via EMS with chest pain.  The pain he is describing is similar to pain he has had in the past.  Pain was improved with nitroglycerin suspicious for  ACS, will give a second dose and reevaluate.  Will obtain EKG and comprehensive labs including troponin and D-dimer.  Patient reports she feels very dehydrated will give liter IV fluids.  Patient appears very anxious and did report some improvement in his anxiety symptoms with fevers that he received in route we will give an additional dose here as well as a DuoNeb for the tightness/wheezing he is reporting.    I reviewed the comprehensive labs which were remarkable for no leukocytosis, no anemia, negative D-dimer -less suspicious for PE.  INR normal.    Troponin 9. Repeat was performed, however patient's EKG is completely normal I do not believe the patient needs to stay in the emergency department to wait for this result.  Patient did request to be discharged given that initial troponin is within the normal range.    I reviewed the chest x-ray as well as read the radiologist report which showed no opacities, pleural effusion, pulmonary edema, pneumothorax, cardiomegaly, mediastinal widening or other concerning findings.    There is unclear etiology of patient's chest pain today, however his work-up is reassuring and I do believe that follow-up with his cardiologist tomorrow would be appropriate.  Patient was discharged home with strong recommendation that he keep his appointment with his cardiologist tomorrow and return to the emergency department should he develop any new or worsening symptoms prior to that.    I have reviewed the nursing notes. I have reviewed the findings, diagnosis, plan and need for follow up with the patient.  Patient was in agreement with this plan was discharged from the emergency department.    Discharge Medication List as of 7/13/2023  9:54 PM          Final diagnoses:   Chest pain       SO Beck CNP  [facility] EMERGENCY DEPARTMENT  7/13/2023     Deandra Lowe APRN CNP  07/13/23 2200    --    ED Attending Physician Attestation    I Nora Brian MD, cared for  this patient with the Advanced Practice Provider (JOAQUÍN). I have performed a history and physical examination of the patient independent of the JOAQUÍN. I reviewed the JOAQUÍN's documentation above and agree with the documented findings and plan of care. I personally provided a substantive portion of the care for this patient, including the complete Medical Decision Making. Please see the JOAQUÍN's documentation for full details.        Nora Brian MD  Emergency Medicine        Nora Brian MD  07/14/23 0049

## 2023-07-13 NOTE — PROGRESS NOTES
[facility] Cardiology Consultation:    Assessment and Plan:     1. Chest pain, normal angiogram 2012, no CAC on chest CT: Though I think the likelihood of finding of obstructive CAD is very low, I do think he needs some test to confirm this given multiple ED visits for chest pain.  I discussed different options with him.  Cannot do CTA or nuclear stress test without sedation.  Stress echo is an option, however he would prefer a more definitive test like an invasive angiogram.  Risk benefits were discussed with the patient and he is agreeable. When I discussed the logistics of the procedure he thinks he will be able to undergo it with conscious sedation and will not require anesthesia.  2. COPD  3. Hemoptysis  4. Depression  5. Chronic smoking  6.  Mild mixed dyslipidemia, no CAC on chest CT. On Lipitor 40  7.  Unable to do CT without sedation     A total of 60 minutes were spent on the day of the visit for chart review, care coordination, face-to-face consultation with the patient, and documentation.    Sergio Recinos MD    Cardiac Imaging and Prevention  [facility]  [email or web address] Pager: [phone]    HPI: Urgent add-on for chest pain.  He has extensive psychiatric history, with a diagnosis of depression and personality disorder, prior commitment for suicidal ideation.  Though he carries a chart diagnosis of CAD, I do not find anything to support this diagnosis.  He thinks he was given this diagnosis based on an admission at [facility] many many years ago.  He has an ongoing history of multiple episodes of hemoptysis of unclear etiology for which he has undergone repeat bronchoscopies and he follows with pulmonology, with no apparent etiology on chart review.  He has had multiple chest CTs that have excluded PEs. He does have bibasilar lung consolidation of unclear etiology.  I reviewed his chest CT that was done earlier this year. It shows no coronary artery  calcifications.  Stress echo done in 2018 was normal, with no ischemia and normal cardiac function.  Basic labs are normal.  Lipid profile checked last month shows a total cholesterol of 210, HDL cholesterol of 43, calculated LDL cholesterol of 102, and triglycerides of 324.  He presented to the [facility] ED yesterday with complaints of syncope and chest pain.  Labs were normal, D-dimer was negative, and high-sensitivity troponin was normal at 9.  Follow-up troponin was 8.  Chest x-ray was normal.  I reviewed his ECG that was done.  It shows normal sinus rhythm without any concerning abnormalities.  Prior CAD evaluations included a nuclear stress test done in 2012 which was normal, and an invasive coronary angiogram also done in 2012 which showed no CAD.      EXAM:  /82 (BP Location: Right arm, Patient Position: Sitting, Cuff Size: Adult Regular)   Pulse 84   Wt 108.5 kg (239 lb 1.6 oz)   SpO2 97%   BMI 29.89 kg/m    GEN/CONSTITUIONAL: Appears comfortable, in no apparent distress   EYES: No icterus  ENT/MOUTH: Normal  JVP:  Not visible  RESPIRATORY: Clear to auscultation bilaterally   CARDIOVASCULAR: Regular S1 and S2, no murmurs, rubs, or gallops.   ABDOMEN: Soft, non-tender, positive bowel sounds   NEUROLOGIC: Grossly non-focal   PSYCHIATRIC: Normal affect  EXT: No cyanosis, clubbing, edema. Normal pedal pulses.  Skin: No petechiae, purpura or rash    PAST MEDICAL HISTORY:  Past Medical History:   Diagnosis Date     CAD (coronary artery disease)     s/p MI     Esophagitis      h/o Acute intermittent porphyria     per patient, doubt raised by GI during 5/2013 admission     Kidney stones      PUD (peptic ulcer disease)        CURRENT MEDICATIONS:  Current Outpatient Medications   Medication     ACETAMINOPHEN-CODEINE 300-30 MG per tablet     albuterol (PROAIR HFA/PROVENTIL HFA/VENTOLIN HFA) 108 (90 Base) MCG/ACT inhaler     ANORO ELLIPTA 62.5-25 MCG/ACT oral inhaler     bisacodyl (DULCOLAX) 5 MG EC tablet      buPROPion (WELLBUTRIN SR) 150 MG 12 hr tablet     chlorhexidine (PERIDEX) 0.12 % solution     clonazePAM (KLONOPIN) 0.5 MG tablet     LANsoprazole (PREVACID) 30 MG DR capsule     lithium (ESKALITH CR/LITHOBID) 450 MG CR tablet     nicotine (NICODERM CQ) 21 MG/24HR 24 hr patch     QUEtiapine (SEROQUEL) 200 MG tablet     QUEtiapine (SEROQUEL) 25 MG tablet     sulindac (CLINORIL) 150 MG tablet     zolpidem (AMBIEN) 10 MG tablet     No current facility-administered medications for this visit.       PAST SURGICAL HISTORY:  Past Surgical History:   Procedure Laterality Date     BRONCHOSCOPY (RIGID OR FLEXIBLE), DIAGNOSTIC N/A 1/26/2023    Procedure: BRONCHOALVEOLAR LAVAGE;  Surgeon: Rochelle Burks MD;  Location: [facility] OR     BRONCHOSCOPY FLEXIBLE AND RIGID N/A 1/26/2023    Procedure: BRONCHOSCOPY,  Airway Inspection;  Surgeon: Rochelle Burks MD;  Location: [facility] OR     ESOPHAGOSCOPY, GASTROSCOPY, DUODENOSCOPY (EGD), COMBINED  2/21/2013    Procedure: COMBINED ESOPHAGOSCOPY, GASTROSCOPY, DUODENOSCOPY (EGD), BIOPSY SINGLE OR MULTIPLE;  EGD with cold biopsy for h pylori;  Surgeon: Jen Galo MD;  Location:  GI     ESOPHAGOSCOPY, GASTROSCOPY, DUODENOSCOPY (EGD), COMBINED  3/8/2013    Procedure: COMBINED ESOPHAGOSCOPY, GASTROSCOPY, DUODENOSCOPY (EGD);  Esophagoscopy,Gastroscopy,Duodenoscopy- Room 504;  Surgeon: Jimenez King DO;  Location:  GI     LAPAROTOMY EXPLORATORY  1995       ALLERGIES   No Known Allergies    FAMILY HISTORY:  No family history on file.    SOCIAL HISTORY:  Social History     Socioeconomic History     Marital status: Single     Spouse name: Not on file     Number of children: Not on file     Years of education: Not on file     Highest education level: Not on file   Occupational History     Not on file   Tobacco Use     Smoking status: Some Days     Packs/day: 0.50     Years: 5.00     Pack years: 2.50     Types: Cigarettes     Smokeless tobacco: Never     Tobacco comments:     patient smokes  "\"to raise his blood pressure\" but very rarely when he is not hospitalized   Substance and Sexual Activity     Alcohol use: No     Drug use: No     Sexual activity: Not Currently   Other Topics Concern     Parent/sibling w/ CABG, MI or angioplasty before 65F 55M? Not Asked   Social History Narrative    Christiano has lived his life in [place], served in the [employer] and with [employer].      Social Determinants of Health     Financial Resource Strain: Not on file   Food Insecurity: Not on file   Transportation Needs: Not on file   Physical Activity: Not on file   Stress: Not on file   Social Connections: Not on file   Intimate Partner Violence: Not on file   Housing Stability: Not on file       ROS:   Constitutional: No fever, chills, or sweats. No weight gain/loss   ENT: No visual disturbance, ear ache, epistaxis, sore throat  Allergies/Immunologic: Negative.   Respiratory: No cough, hemoptysia  Cardiovascular: As per HPI  GI: No nausea, vomiting, hematemesis, melena, or hematochezia  : No urinary frequency, dysuria, or hematuria  Integument: Negative  Psychiatric: Negative  Neuro: Negative  Endocrinology: Negative   Musculoskeletal: Negative    ADDITIONAL COMMENTS:     I reviewed the patient's medications:     I reviewed the patient's pertinent clinical laboratory studies:     I reviewed the patient's pertinent imaging studies:   I reviewed the patient's ECG:     "

## 2023-07-14 ENCOUNTER — OFFICE VISIT (OUTPATIENT)
Dept: CARDIOLOGY | Facility: CLINIC | Age: 53
End: 2023-07-14
Payer: COMMERCIAL

## 2023-07-14 VITALS
DIASTOLIC BLOOD PRESSURE: 82 MMHG | OXYGEN SATURATION: 97 % | SYSTOLIC BLOOD PRESSURE: 119 MMHG | HEART RATE: 84 BPM | BODY MASS INDEX: 29.89 KG/M2 | WEIGHT: 239.1 LBS

## 2023-07-14 DIAGNOSIS — I20.89 OTHER FORMS OF ANGINA PECTORIS (H): Primary | ICD-10-CM

## 2023-07-14 PROCEDURE — 99204 OFFICE O/P NEW MOD 45 MIN: CPT | Performed by: INTERNAL MEDICINE

## 2023-07-14 RX ORDER — ATORVASTATIN CALCIUM 80 MG/1
80 TABLET, FILM COATED ORAL DAILY
COMMUNITY

## 2023-07-14 RX ORDER — GABAPENTIN 300 MG/1
300 CAPSULE ORAL 3 TIMES DAILY
COMMUNITY
Start: 2023-06-27

## 2023-07-14 RX ORDER — NICOTINE 4 MG/1
INHALANT RESPIRATORY (INHALATION)
COMMUNITY
Start: 2023-06-27

## 2023-07-14 RX ORDER — PANTOPRAZOLE SODIUM 40 MG/1
40 TABLET, DELAYED RELEASE ORAL DAILY
COMMUNITY
Start: 2023-07-05 | End: 2024-08-30

## 2023-07-14 NOTE — DISCHARGE INSTRUCTIONS
TODAY'S VISIT:  You were seen today for chest pain  -Your EKG looked normal without any concerning findings.  Your high-sensitivity troponin was reassuring that this pain is unlikely related to something going on acutely with your heart today. Your D dimer was normal indicating it is very unlikely that you have a blood clot. Your other labs were also reassuringly normal.   - If you had any labs or imaging/radiology tests performed today, you should also discuss these tests with your usual provider.     FOLLOW-UP:  Please make an appointment to follow up with:  - I strongly encourage you to keep your cardiology appointment tomorrow as previously scheduled.  Hopefully they are able to help figure out what is going on and prevent you from needing to return to the emergency department repeatedly for work-ups for your chest pain.  - Your Primary Care Provider. If you do not have a PCP, please call the Primary Care Center (phone: [phone]) for an appointment      - Have your provider review the results from today's visit with you again to make sure no further follow-up or additional testing is needed based on those results.     PRESCRIPTIONS / MEDICATIONS:  - Continue getting your other medications as they are prescribed      RETURN TO THE EMERGENCY DEPARTMENT  Return to the Emergency Department at any time for any new or worsening symptoms or any concerns.

## 2023-07-14 NOTE — PATIENT INSTRUCTIONS
Pre-procedure instructions - Coronary Angiogram  Patient Education    Your arrival time is________ on _________ .  Location is [address] Waiting Room  Please plan on being at the hospital all day.  At any time, emergencies and/or urgent cases may come up which could delay the start of your procedure.    Pre-procedure instructions - Coronary Angiogram  Shower in the evening before or the morning of the procedure  No solid food for 8 hours prior and nothing to drink 2 hours prior to arrival time  You can take your morning medications (except for diabetic and blood thinners) with sips of water.  Take 325 mg of Asprin 24 hours prior to the procedure and the morning of procedure.   You will need to arrange a ride to drop you off and pick you up, as you will be unable to drive home.  Prior to discharge you may be required to lay flat for approximately 2-4 hours in the recovery unit to ensure proper clotting of the artery.                        Anticoagulation Medication Instructions   NA    You will need to follow up with one of our cardiology APPs 1-2 weeks after your procedure. If you need help scheduling or rescheduling your appointment, please call [phone]

## 2023-07-19 RX ORDER — SODIUM CHLORIDE 9 MG/ML
INJECTION, SOLUTION INTRAVENOUS CONTINUOUS
Status: CANCELLED | OUTPATIENT
Start: 2023-07-19

## 2023-07-19 RX ORDER — ASPIRIN 325 MG
325 TABLET ORAL ONCE
Status: CANCELLED | OUTPATIENT
Start: 2023-07-19 | End: 2023-07-19

## 2023-07-19 RX ORDER — ASPIRIN 81 MG/1
243 TABLET, CHEWABLE ORAL ONCE
Status: CANCELLED | OUTPATIENT
Start: 2023-07-19

## 2023-07-19 RX ORDER — LIDOCAINE 40 MG/G
CREAM TOPICAL
Status: CANCELLED | OUTPATIENT
Start: 2023-07-19

## 2023-07-25 ENCOUNTER — TELEPHONE (OUTPATIENT)
Dept: CARDIOLOGY | Facility: CLINIC | Age: 53
End: 2023-07-25
Payer: COMMERCIAL

## 2023-07-25 NOTE — TELEPHONE ENCOUNTER
Pre-procedure instructions - Coronary Angiogram  Patient Education    Your arrival time is 12:30 pm on July 28 th.   Location is [address] Waiting Room  Please plan on being at the hospital all day.  At any time, emergencies and/or urgent cases may come up which could delay the start of your procedure.    Pre-procedure instructions - Coronary Angiogram  Shower in the evening before or the morning of the procedure  No solid food for 8 hours prior and nothing to drink 2 hours prior to arrival time  You can take your morning medications (except for diabetic and blood thinners) with sips of water.  Take 325 mg of Asprin 24 hours prior to the procedure and the morning of procedure.   You will need to arrange a ride to drop you off and pick you up, as you will be unable to drive home.  Prior to discharge you may be required to lay flat for approximately 2-4 hours in the recovery unit to ensure proper clotting of the artery.                         Anticoagulation Medication Instructions   NA

## 2023-07-28 ENCOUNTER — HOSPITAL ENCOUNTER (OUTPATIENT)
Facility: CLINIC | Age: 53
Discharge: HOME | End: 2023-07-28
Attending: INTERNAL MEDICINE | Admitting: INTERNAL MEDICINE
Payer: COMMERCIAL

## 2023-07-28 ENCOUNTER — APPOINTMENT (OUTPATIENT)
Dept: LAB | Facility: CLINIC | Age: 53
End: 2023-07-28
Attending: INTERNAL MEDICINE
Payer: COMMERCIAL

## 2023-07-28 ENCOUNTER — APPOINTMENT (OUTPATIENT)
Dept: MEDSURG UNIT | Facility: CLINIC | Age: 53
End: 2023-07-28
Attending: INTERNAL MEDICINE
Payer: COMMERCIAL

## 2023-07-28 VITALS
HEIGHT: 75 IN | SYSTOLIC BLOOD PRESSURE: 106 MMHG | DIASTOLIC BLOOD PRESSURE: 77 MMHG | WEIGHT: 240 LBS | RESPIRATION RATE: 16 BRPM | TEMPERATURE: 97.9 F | OXYGEN SATURATION: 97 % | HEART RATE: 77 BPM | BODY MASS INDEX: 29.84 KG/M2

## 2023-07-28 DIAGNOSIS — I25.10 CORONARY ARTERY DISEASE INVOLVING NATIVE CORONARY ARTERY OF NATIVE HEART WITHOUT ANGINA PECTORIS: Primary | ICD-10-CM

## 2023-07-28 DIAGNOSIS — R07.9 CHEST PAIN: ICD-10-CM

## 2023-07-28 PROBLEM — Z98.890 STATUS POST CORONARY ANGIOGRAM: Status: ACTIVE | Noted: 2023-07-28

## 2023-07-28 LAB
ANION GAP SERPL CALCULATED.3IONS-SCNC: 11 MMOL/L (ref 7–15)
BUN SERPL-MCNC: 12.2 MG/DL (ref 6–20)
CALCIUM SERPL-MCNC: 9.2 MG/DL (ref 8.6–10)
CHLORIDE SERPL-SCNC: 108 MMOL/L (ref 98–107)
CREAT SERPL-MCNC: 0.93 MG/DL (ref 0.67–1.17)
DEPRECATED HCO3 PLAS-SCNC: 22 MMOL/L (ref 22–29)
ERYTHROCYTE [DISTWIDTH] IN BLOOD BY AUTOMATED COUNT: 13.3 % (ref 10–15)
GFR SERPL CREATININE-BSD FRML MDRD: >90 ML/MIN/1.73M2
GLUCOSE SERPL-MCNC: 96 MG/DL (ref 70–99)
HCT VFR BLD AUTO: 41.5 % (ref 40–53)
HGB BLD-MCNC: 13.8 G/DL (ref 13.3–17.7)
INR PPP: 0.98 (ref 0.85–1.15)
MCH RBC QN AUTO: 29 PG (ref 26.5–33)
MCHC RBC AUTO-ENTMCNC: 33.3 G/DL (ref 31.5–36.5)
MCV RBC AUTO: 87 FL (ref 78–100)
PLATELET # BLD AUTO: 308 10E3/UL (ref 150–450)
POTASSIUM SERPL-SCNC: 4.1 MMOL/L (ref 3.4–5.3)
RBC # BLD AUTO: 4.76 10E6/UL (ref 4.4–5.9)
SODIUM SERPL-SCNC: 141 MMOL/L (ref 136–145)
WBC # BLD AUTO: 11 10E3/UL (ref 4–11)

## 2023-07-28 PROCEDURE — 93454 CORONARY ARTERY ANGIO S&I: CPT | Mod: 26 | Performed by: INTERNAL MEDICINE

## 2023-07-28 PROCEDURE — 80048 BASIC METABOLIC PNL TOTAL CA: CPT | Performed by: INTERNAL MEDICINE

## 2023-07-28 PROCEDURE — 250N000011 HC RX IP 250 OP 636: Mod: JZ | Performed by: INTERNAL MEDICINE

## 2023-07-28 PROCEDURE — C1887 CATHETER, GUIDING: HCPCS | Performed by: INTERNAL MEDICINE

## 2023-07-28 PROCEDURE — 258N000003 HC RX IP 258 OP 636: Performed by: INTERNAL MEDICINE

## 2023-07-28 PROCEDURE — 85027 COMPLETE CBC AUTOMATED: CPT | Performed by: INTERNAL MEDICINE

## 2023-07-28 PROCEDURE — 93458 L HRT ARTERY/VENTRICLE ANGIO: CPT | Performed by: INTERNAL MEDICINE

## 2023-07-28 PROCEDURE — C1894 INTRO/SHEATH, NON-LASER: HCPCS | Performed by: INTERNAL MEDICINE

## 2023-07-28 PROCEDURE — 85610 PROTHROMBIN TIME: CPT | Performed by: INTERNAL MEDICINE

## 2023-07-28 PROCEDURE — 93005 ELECTROCARDIOGRAM TRACING: CPT

## 2023-07-28 PROCEDURE — 999N000054 HC STATISTIC EKG NON-CHARGEABLE

## 2023-07-28 PROCEDURE — 250N000011 HC RX IP 250 OP 636: Performed by: INTERNAL MEDICINE

## 2023-07-28 PROCEDURE — 99152 MOD SED SAME PHYS/QHP 5/>YRS: CPT | Mod: GC | Performed by: INTERNAL MEDICINE

## 2023-07-28 PROCEDURE — 999N000134 HC STATISTIC PP CARE STAGE 3

## 2023-07-28 PROCEDURE — 999N000142 HC STATISTIC PROCEDURE PREP ONLY

## 2023-07-28 PROCEDURE — 272N000001 HC OR GENERAL SUPPLY STERILE: Performed by: INTERNAL MEDICINE

## 2023-07-28 PROCEDURE — 99152 MOD SED SAME PHYS/QHP 5/>YRS: CPT | Performed by: INTERNAL MEDICINE

## 2023-07-28 PROCEDURE — 999N000248 HC STATISTIC IV INSERT WITH US BY RN

## 2023-07-28 PROCEDURE — 250N000009 HC RX 250: Performed by: INTERNAL MEDICINE

## 2023-07-28 RX ORDER — NALOXONE HYDROCHLORIDE 0.4 MG/ML
0.4 INJECTION, SOLUTION INTRAMUSCULAR; INTRAVENOUS; SUBCUTANEOUS
Status: DISCONTINUED | OUTPATIENT
Start: 2023-07-28 | End: 2023-07-28 | Stop reason: HOSPADM

## 2023-07-28 RX ORDER — LIDOCAINE 40 MG/G
CREAM TOPICAL
Status: DISCONTINUED | OUTPATIENT
Start: 2023-07-28 | End: 2023-07-28 | Stop reason: HOSPADM

## 2023-07-28 RX ORDER — FENTANYL CITRATE 50 UG/ML
25 INJECTION, SOLUTION INTRAMUSCULAR; INTRAVENOUS
Status: DISCONTINUED | OUTPATIENT
Start: 2023-07-28 | End: 2023-07-28 | Stop reason: HOSPADM

## 2023-07-28 RX ORDER — ATROPINE SULFATE 0.1 MG/ML
0.5 INJECTION INTRAVENOUS
Status: DISCONTINUED | OUTPATIENT
Start: 2023-07-28 | End: 2023-07-28 | Stop reason: HOSPADM

## 2023-07-28 RX ORDER — NALOXONE HYDROCHLORIDE 0.4 MG/ML
0.2 INJECTION, SOLUTION INTRAMUSCULAR; INTRAVENOUS; SUBCUTANEOUS
Status: DISCONTINUED | OUTPATIENT
Start: 2023-07-28 | End: 2023-07-28 | Stop reason: HOSPADM

## 2023-07-28 RX ORDER — OXYCODONE HYDROCHLORIDE 10 MG/1
10 TABLET ORAL EVERY 4 HOURS PRN
Status: DISCONTINUED | OUTPATIENT
Start: 2023-07-28 | End: 2023-07-28 | Stop reason: HOSPADM

## 2023-07-28 RX ORDER — IOPAMIDOL 755 MG/ML
INJECTION, SOLUTION INTRAVASCULAR
Status: DISCONTINUED | OUTPATIENT
Start: 2023-07-28 | End: 2023-07-28 | Stop reason: HOSPADM

## 2023-07-28 RX ORDER — OXYCODONE HYDROCHLORIDE 5 MG/1
5 TABLET ORAL EVERY 4 HOURS PRN
Status: DISCONTINUED | OUTPATIENT
Start: 2023-07-28 | End: 2023-07-28 | Stop reason: HOSPADM

## 2023-07-28 RX ORDER — SODIUM CHLORIDE 9 MG/ML
INJECTION, SOLUTION INTRAVENOUS CONTINUOUS
Status: DISCONTINUED | OUTPATIENT
Start: 2023-07-28 | End: 2023-07-28 | Stop reason: HOSPADM

## 2023-07-28 RX ORDER — HEPARIN SODIUM 1000 [USP'U]/ML
INJECTION, SOLUTION INTRAVENOUS; SUBCUTANEOUS
Status: DISCONTINUED | OUTPATIENT
Start: 2023-07-28 | End: 2023-07-28 | Stop reason: HOSPADM

## 2023-07-28 RX ORDER — DIPHENHYDRAMINE HYDROCHLORIDE 50 MG/ML
INJECTION INTRAMUSCULAR; INTRAVENOUS
Status: DISCONTINUED | OUTPATIENT
Start: 2023-07-28 | End: 2023-07-28 | Stop reason: HOSPADM

## 2023-07-28 RX ORDER — ASPIRIN 325 MG
325 TABLET ORAL ONCE
Status: COMPLETED | OUTPATIENT
Start: 2023-07-28 | End: 2023-07-28

## 2023-07-28 RX ORDER — FLUMAZENIL 0.1 MG/ML
0.2 INJECTION, SOLUTION INTRAVENOUS
Status: DISCONTINUED | OUTPATIENT
Start: 2023-07-28 | End: 2023-07-28 | Stop reason: HOSPADM

## 2023-07-28 RX ORDER — ASPIRIN 81 MG/1
243 TABLET, CHEWABLE ORAL ONCE
Status: COMPLETED | OUTPATIENT
Start: 2023-07-28 | End: 2023-07-28

## 2023-07-28 RX ORDER — FENTANYL CITRATE 50 UG/ML
INJECTION, SOLUTION INTRAMUSCULAR; INTRAVENOUS
Status: DISCONTINUED | OUTPATIENT
Start: 2023-07-28 | End: 2023-07-28 | Stop reason: HOSPADM

## 2023-07-28 RX ADMIN — SODIUM CHLORIDE: 9 INJECTION, SOLUTION INTRAVENOUS at 14:32

## 2023-07-28 ASSESSMENT — ACTIVITIES OF DAILY LIVING (ADL)
ADLS_ACUITY_SCORE: 37

## 2023-07-28 NOTE — DISCHARGE INSTRUCTIONS
Going Home after a Coronary Angiogram  ______________________________________________      After you go home:  Have an adult stay with you for 24 hours.  Drink plenty of fluids.  You may eat your normal diet, unless your doctor tells you otherwise.  For 24 hours:  Relax and take it easy.  Do NOT smoke.  Do NOT make any important or legal decisions.  Do NOT drive or operate machines at home or at work.  Do NOT drink alcohol.  Remove the Band-Aid after 24 hours. If there is minor oozing, apply another Band-aid and remove it after 12 hours.  For 2 days, do NOT have sex or do any heavy exercise.  Do NOT take a bath, or use a hot tub or pool for at least 3 days. You may shower after 24 hrs.   For the next 2 days when you cough, sneeze, laugh or move your bowel,  apply a light pressure with you hard on your radial access site.        If you start bleeding from the site in your groin, lie down flat and press firmly  on the site. Call your doctor as soon as you can.    Care of wrist or arm site:  It is normal to have soreness at the puncture site and mild tingling in your hand for up to 3 days.  For 2 days, do not use your hand or arm to support your weight (such as rising from a chair) or bend your wrist (such as lifting a garage door).  For 2 days, do not lift more than 5 pounds or exercise your arm (tennis, golf or bowling).    If you start bleeding from the site in your arm:  Sit down and press firmly on the site with your fingers for 10 minutes. Call your doctor as soon as you can.  If the bleeding stops, sit still and keep your wrist straight for 2 hours.    Medicines:  If you have started taking Plavix or Effient, do not stop taking it until you talk to your heart doctor (cardiologist).  If you are on metformin (Glucophage), do not restart it until you have blood tests (within 2 to 3 days after discharge). When your doctor tells you it is safe, you may restart the metformin.  If you have stopped any other  medicines, check with your nurse or provider about when to restart them.    Call 911 right away if you have bleeding that is heavy or does not stop.    Call your doctor if:  You have a large or growing hard lump around the site.  The site is red, swollen, hot or tender.  Blood or fluid is draining from the site.  You have chills or a fever greater than 101 F (38 C).  Your leg or arm feels numb or cool.  You have hives, a rash or unusual itching.      [facility] [place]:  [phone] (7 days a week)

## 2023-07-28 NOTE — PROGRESS NOTES
Pt prepped for CORS.PIV placed and NS flush started.Ihsan groins shaved and prepped and pulses marked and charted normal.IV contrast discharge instructions were reviewed and signed with his PCA (Sangeeta [phone])at bedside who will also be picking pt up after procedure and wants updates also if possible.Consent signed and questions answered.

## 2023-07-28 NOTE — PROVIDER NOTIFICATION
"Time: paged cath lab fellows at 1630.    Reason: pt said, \" I don't feel safe going to the assisted living.  No one will check on me.  If I bleed they will find me dead after after 3 days\".  Called assisted living, Sangeeta, and asked how often they can monitor him, per Sangeeta, they can check on pt q1hr\".  Communicated what Sangeeta said with pt and pt incised on staying for safety reasons.   No call back from fellows  RN spoke to  CC[facility] RN and informed the situation. Per ONEYDA Fuentes, fellow said that pt is medically stable to go to his assisted living and if he does not want to go RN need to involve the nursing supervisor.    "

## 2023-07-28 NOTE — PROGRESS NOTES
D/I/A:  Patient is tolerating liquids and foods, ambulating, urinating, puncture sites are stable ( no bleeding and no hematoma) and patient has a .  A+O x4 and making needs known.  CCL access sites C/D/I; no bleeding or hematoma; CMS intact.  VSSA.  SR on monitor.  IV access removed.  Education completed and outlined in AVS or handout: medications reviewed with patient.  Questions answered prior to discharge.  Belongings returned to patient at discharge.    P: Discharged to self care.  Patient to follow up with appts as per discharge instruction.    Reviewed discharge instructions over the phone with Brit and a copy of discharge paper work will be sent with ptMarcia Rutherford, assisted living supervisor,  verbalized understanding of discharge instructions and said will review those instructions with assisted living staff.

## 2023-07-28 NOTE — Clinical Note
dry, intact, no bleeding and no hematoma. RRA sheath removed, TR-Band applied with 12 ml of air. Arm board

## 2023-08-01 LAB
ATRIAL RATE - MUSE: 80 BPM
DIASTOLIC BLOOD PRESSURE - MUSE: NORMAL MMHG
INTERPRETATION ECG - MUSE: NORMAL
P AXIS - MUSE: 34 DEGREES
PR INTERVAL - MUSE: 178 MS
QRS DURATION - MUSE: 90 MS
QT - MUSE: 368 MS
QTC - MUSE: 424 MS
R AXIS - MUSE: -25 DEGREES
SYSTOLIC BLOOD PRESSURE - MUSE: NORMAL MMHG
T AXIS - MUSE: 54 DEGREES
VENTRICULAR RATE- MUSE: 80 BPM

## 2023-08-09 NOTE — PROGRESS NOTES
Writer asked to provide additional information regarding previous interaction with patient when threatening to leave AMA. Patient loudly verbalizes the request to leave AMA. Providers were notified. Patient was ripping off his tele leads. Writer requested to remove IV prior to leaving, however patient refused. Writer then attempted to provide supplies and verbally instructed patient how to remove IV. While extending my hand to offer supplies, patient swatted my hands away. Patient then left room and walked with security to lobby.

## 2024-02-07 ENCOUNTER — TRANSFERRED RECORDS (OUTPATIENT)
Dept: HEALTH INFORMATION MANAGEMENT | Facility: CLINIC | Age: 54
End: 2024-02-07

## 2024-02-09 ENCOUNTER — E-VISIT (OUTPATIENT)
Dept: FAMILY MEDICINE | Facility: CLINIC | Age: 54
End: 2024-02-09
Payer: COMMERCIAL

## 2024-02-09 DIAGNOSIS — H92.03 OTALGIA, BILATERAL: Primary | ICD-10-CM

## 2024-02-09 PROCEDURE — 99207 PR NON-BILLABLE SERV PER CHARTING: CPT | Performed by: PHYSICIAN ASSISTANT

## 2024-06-16 ENCOUNTER — HEALTH MAINTENANCE LETTER (OUTPATIENT)
Age: 54
End: 2024-06-16

## 2024-07-12 DIAGNOSIS — J01.00 ACUTE NON-RECURRENT MAXILLARY SINUSITIS: Primary | ICD-10-CM

## 2024-07-22 ENCOUNTER — OFFICE VISIT (OUTPATIENT)
Dept: OTOLARYNGOLOGY | Facility: CLINIC | Age: 54
End: 2024-07-22
Payer: COMMERCIAL

## 2024-07-22 DIAGNOSIS — H90.3 SENSORINEURAL HEARING LOSS, BILATERAL: ICD-10-CM

## 2024-07-22 DIAGNOSIS — J32.0 CHRONIC MAXILLARY SINUSITIS: Primary | ICD-10-CM

## 2024-07-22 DIAGNOSIS — H92.01 OTALGIA, RIGHT: ICD-10-CM

## 2024-07-22 DIAGNOSIS — F43.10 PTSD (POST-TRAUMATIC STRESS DISORDER): ICD-10-CM

## 2024-07-22 PROCEDURE — 99205 OFFICE O/P NEW HI 60 MIN: CPT | Performed by: PHYSICIAN ASSISTANT

## 2024-07-22 RX ORDER — AZELASTINE 1 MG/ML
1 SPRAY, METERED NASAL 2 TIMES DAILY
Qty: 30 ML | Refills: 11 | Status: ON HOLD | OUTPATIENT
Start: 2024-07-22 | End: 2024-09-03

## 2024-07-22 NOTE — PATIENT INSTRUCTIONS
Chronic Sinusitis: Care Instructions  Overview     Sinusitis is an inflammation of the mucous membranes inside the nose and sinuses. Sinuses are the hollow spaces in your skull around the eyes and nose. Sinusitis can cause pain and pressure in your head and face along with a stuffy or blocked nose. It can also cause thick, discolored mucus that drains from the nose or down the back of the throat. If these symptoms last 12 weeks or longer, you may have chronic sinusitis.  Chronic sinusitis is caused by long-term swelling of the sinuses and nasal passages. Other things, such as allergies and nasal polyps, may also be involved. A deviated nasal septum can also make it worse.  When the mucous membranes that line the sinuses get inflamed, they swell and make more mucus. The swelling can block the normal drainage of fluid from the sinuses into the nose and throat. If the fluid and mucus can't drain, they build up over time. This can make future sinus infections more likely. Chronic sinusitis can be hard to treat.  You will likely need a steroid nasal spray. Nasal washes are an important part of your treatment too. Antibiotics may be used if there's a bacterial infection. Other medicines may be needed. Surgery may be recommended if your symptoms don't get better after treatment.  Follow-up care is a key part of your treatment and safety. Be sure to make and go to all appointments, and call your doctor if you are having problems. It's also a good idea to know your test results and keep a list of the medicines you take.  How can you care for yourself at home?  Medicines    Your doctor will likely recommend a steroid nasal spray. Sometimes steroids are used as a wash, drops, or pills. Take this and other medicines exactly as prescribed.     If needed, ask your doctor if you can take an over-the-counter pain medicine, such as acetaminophen (Tylenol), ibuprofen (Advil, Motrin), or naproxen (Aleve). Be safe with medicines. Read  and follow all instructions on the label.     If your doctor prescribed antibiotics, take them as directed. Do not stop taking them just because you feel better. You need to take the full course of antibiotics.   At home    Use saline (saltwater) nasal washes every day. This helps keep your nasal passages open. It also can wash out mucus and allergens.  You can buy saline nasal washes at a grocery store or drugstore. Follow the instructions on the package.  You can make your own at home. Add 1 teaspoon of non-iodized salt and 1 teaspoon of baking soda to 2 cups of distilled or boiled and cooled water. Fill a squeeze bottle or neti pot with the nasal wash. Then put the tip into your nostril, and lean over the sink. With your mouth open, gently squirt the liquid. Repeat on the other side.     Do not smoke or breathe secondhand smoke. Smoking can make sinusitis worse. If you need help quitting, talk to your doctor about stop-smoking programs and medicines. These can increase your chances of quitting for good.     Breathe warm, moist air. You can use a steamy shower, a hot bath, or a sink filled with hot water. Avoid cold, dry air. Using a humidifier in your home may help. Follow the instructions for cleaning the machine.   When should you call for help?   Call your doctor now or seek immediate medical care if:    You have new or worse swelling, redness, or pain in your face or around one or both of your eyes.     You have double vision or a change in your vision.     You have a high fever.     You have a severe headache and a stiff neck.     You have mental changes, such as feeling confused or much less alert.   Watch closely for changes in your health, and be sure to contact your doctor if:    You have symptoms of a new sinus infection that lasts longer than 7 to 10 days. These symptoms may include the following:  You have new or worse facial pain.  The mucus from your nose becomes thicker (like pus) or has new blood  "in it.  Your stuffy nose and congestion get worse.     You do not get better as expected.   Where can you learn more?  Go to https://www.[employer].net/patiented  Enter F824 in the search box to learn more about \"Chronic Sinusitis: Care Instructions.\"  Current as of: September 27, 2023               Content Version: 14.0    7821-0201 [employer].   Care instructions adapted under license by your healthcare professional. If you have questions about a medical condition or this instruction, always ask your healthcare professional. [employer] disclaims any warranty or liability for your use of this information.   "

## 2024-07-22 NOTE — NURSING NOTE
Sino-Nasal Outcome Test (SNOT - 22)    1. Need to Blow Nose: (P) Severe  2. Nasal Blockage: (P) Moderate  3. Sneezing: (P) Severe  4. Runny Nose: (P) Moderate  5. Cough: (P) Mild or slight  6. Post-nasal discharge: (P) Severe  7. Thick nasal discharge: (P) Severe  8. Ear fullness: (P) Bad as it can be  9. Dizziness: (P) Mild or slight  10. Ear Pain: (P) Bad as it can be  11. Facial pain/pressure: (P) Severe  12. Decreased Sense of Smell/Taste: (P) None  13. Difficulty falling asleep: (P) Moderate  14. Wake up at night: (P) None  15. Lack of a good night's sleep: (P) Very mild  16. Wake up tired: (P) Very mild  17. Fatigue: (P) Very mild  18. Reduced Productivity: (P) Very mild  19. Reduced Concentration: (P) Very mild  20. Frustrated/restless/irritable: (P) Bad as it can be  21. Sad: (P) Severe  22. Embarrassed: (P) None    Total Score: (P) 57    COPYRIGHT 1996. [facility]

## 2024-07-22 NOTE — PROGRESS NOTES
Assessment & Plan     Needs sinus CT under conscious sedation.   Discussed with [facility] CT tech and scheduling.    Start astelin.      NEERAJ for oral surgery imaging as can't find images in Epic.    Right Ear pain/fullness,hearing loss- Tymp and ear exam were normal today. may simply be referred pain  s/p sinus surgery hx and new sinus issues.  Audio scheduled for next week. Could be TMJ component as well but will work up sinuses first.             Problem List Items Addressed This Visit    None  Visit Diagnoses       Chronic maxillary sinusitis    -  Primary    Relevant Medications    azelastine (ASTELIN) 0.1 % nasal spray    Other Relevant Orders    CT Sinus w/o Contrast    PTSD (post-traumatic stress disorder)        Sensorineural hearing loss, bilateral        Otalgia, right                    65 minutes spent on the date of the encounter doing chart review, history and exam, documentation and further activities per the note  {     DULCE Lawson  [facility]    Subjective     HPI   18 months ago found  at the [employer] [facility] 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  Then saw plastics to close the communication in late May, since then has been having RT facial pain, RT otalgia, aural fullness  hearing loss with tinnitus,  wears hearing aids, with  noise exposures.  Has basically been on ABXs (Augmentin ) for the past  several months.  They did a steroid initially which didn't help.  Has been using a nasal saline spray.  They ordered panelipse which showed right maxillary opacification.  He needed some xanax for this.         No nasal discharge.  + nasal obstruction on right.      Has severe PTSD s/p  service,  can't tolerate CTs even with benzos.        Has hearing aids, last audio was 8 months.  H[facility] [place] _-he can  get in next Tuesday there for repeat audio.        Has been at  primary clinics for decades.      Review of Systems   ENT as above      Objective    There were no vitals taken for this visit.    Physical Exam     Constitutional:   The patient was in no acute distress.      Head/Face:   Normocephalic and atraumatic.  No lesions or scars.     Ears:  The tympanic membranes are normal in appearance, bony landmarks are intact.  No retraction, perforation, or masses.   No fluid or purulence was seen in the external canal or the middle ear. No evidence of infection of the middle ear or external canal, cerumen was normal in appearance.    Nose:  Anterior rhinoscopy revealed midline septum and absence of purulence or polyps.   Negative lotus and mod lotus.      Mouth:  Normal tongue, floor of mouth, buccal mucosa, and palate.  No lesions, ulceration or  masses on inspection, normal voice quality      Oropharynx:  Normal mucosa, palate symmetric with normal elevation. Tonsils  1.   RIGHT Pterygoid region  tender w/o crepitus.     Neck:  Supple with normal laryngeal and tracheal landmarks. No palpable thyroid.     Lymphatic:  There is no palpable lymphadenopathy in the neck.

## 2024-07-22 NOTE — LETTER
7/22/2024      Christiano aGrcia  [address]  [place] [address]      Dear Colleague,    Thank you for referring your patient, Christiano Garcia, to the [facility]. Please see a copy of my visit note below.    Assessment & Plan    Needs sinus CT under conscious sedation.   Discussed with [facility] CT tech and scheduling.    Start astelin.      NEERAJ for oral surgery imaging as can't find images in Epic.    Right Ear pain/fullness,hearing loss- Tymp and ear exam were normal today. may simply be referred pain  s/p sinus surgery hx and new sinus issues.  Audio scheduled for next week. Could be TMJ component as well but will work up sinuses first.             Problem List Items Addressed This Visit    None  Visit Diagnoses       Chronic maxillary sinusitis    -  Primary    Relevant Medications    azelastine (ASTELIN) 0.1 % nasal spray    Other Relevant Orders    CT Sinus w/o Contrast    PTSD (post-traumatic stress disorder)        Sensorineural hearing loss, bilateral        Otalgia, right                    65 minutes spent on the date of the encounter doing chart review, history and exam, documentation and further activities per the note  {     DULCE Lawson  [facility]    Subjective     HPI   18 months ago found  at the [employer] [facility] 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  Then saw plastics to close the communication in late May, since then has been having RT facial pain, RT otalgia, aural fullness  hearing loss with tinnitus,  wears hearing aids, with  noise exposures.  Has basically been on ABXs (Augmentin ) for the past  several months.  They did a steroid initially which didn't help.  Has been using a nasal saline spray.  They ordered panelipse which showed right maxillary opacification.  He needed some xanax for this.         No  nasal discharge.  + nasal obstruction on right.      Has severe PTSD s/p  service,  can't tolerate CTs even with benzos.        Has hearing aids, last audio was 8 months.  [facility] _-he can get in next Tuesday there for repeat audio.        Has been at  primary clinics for decades.      Review of Systems   ENT as above      Objective    There were no vitals taken for this visit.    Physical Exam     Constitutional:   The patient was in no acute distress.      Head/Face:   Normocephalic and atraumatic.  No lesions or scars.     Ears:  The tympanic membranes are normal in appearance, bony landmarks are intact.  No retraction, perforation, or masses.   No fluid or purulence was seen in the external canal or the middle ear. No evidence of infection of the middle ear or external canal, cerumen was normal in appearance.    Nose:  Anterior rhinoscopy revealed midline septum and absence of purulence or polyps.   Negative lotus and mod lotus.      Mouth:  Normal tongue, floor of mouth, buccal mucosa, and palate.  No lesions, ulceration or  masses on inspection, normal voice quality      Oropharynx:  Normal mucosa, palate symmetric with normal elevation. Tonsils  1.   RIGHT Pterygoid region  tender w/o crepitus.     Neck:  Supple with normal laryngeal and tracheal landmarks. No palpable thyroid.     Lymphatic:  There is no palpable lymphadenopathy in the neck.                    Again, thank you for allowing me to participate in the care of your patient.        Sincerely,        DULCE Lawson

## 2024-07-23 ENCOUNTER — MYC MEDICAL ADVICE (OUTPATIENT)
Dept: INTERVENTIONAL RADIOLOGY/VASCULAR | Facility: CLINIC | Age: 54
End: 2024-07-23
Payer: COMMERCIAL

## 2024-07-24 NOTE — TELEPHONE ENCOUNTER
Christiano has a scheduled pre-operative physical on Monday July 29th.  He now has IR fax number for that documentation.  He also plans on bringing a hard copy of the pre-operative physical documentation to IR on 7/31/2024.    Angela NUNEZ  Interventional Radiology RN   [phone]

## 2024-07-31 ENCOUNTER — HOSPITAL ENCOUNTER (OUTPATIENT)
Dept: CT IMAGING | Facility: HOSPITAL | Age: 54
Discharge: HOME | End: 2024-07-31
Attending: PHYSICIAN ASSISTANT | Admitting: PHYSICIAN ASSISTANT
Payer: COMMERCIAL

## 2024-07-31 VITALS
OXYGEN SATURATION: 97 % | RESPIRATION RATE: 14 BRPM | SYSTOLIC BLOOD PRESSURE: 115 MMHG | DIASTOLIC BLOOD PRESSURE: 80 MMHG | HEART RATE: 63 BPM | TEMPERATURE: 98.2 F

## 2024-07-31 DIAGNOSIS — J32.0 CHRONIC MAXILLARY SINUSITIS: ICD-10-CM

## 2024-07-31 PROCEDURE — 250N000011 HC RX IP 250 OP 636: Performed by: RADIOLOGY

## 2024-07-31 PROCEDURE — 99152 MOD SED SAME PHYS/QHP 5/>YRS: CPT

## 2024-07-31 RX ORDER — NALOXONE HYDROCHLORIDE 0.4 MG/ML
0.2 INJECTION, SOLUTION INTRAMUSCULAR; INTRAVENOUS; SUBCUTANEOUS
Status: DISCONTINUED | OUTPATIENT
Start: 2024-07-31 | End: 2024-08-01 | Stop reason: HOSPADM

## 2024-07-31 RX ORDER — FENTANYL CITRATE 50 UG/ML
25-50 INJECTION, SOLUTION INTRAMUSCULAR; INTRAVENOUS EVERY 5 MIN PRN
Status: DISCONTINUED | OUTPATIENT
Start: 2024-07-31 | End: 2024-08-01 | Stop reason: HOSPADM

## 2024-07-31 RX ORDER — FLUMAZENIL 0.1 MG/ML
0.2 INJECTION, SOLUTION INTRAVENOUS
Status: DISCONTINUED | OUTPATIENT
Start: 2024-07-31 | End: 2024-08-01 | Stop reason: HOSPADM

## 2024-07-31 RX ORDER — NALOXONE HYDROCHLORIDE 0.4 MG/ML
0.4 INJECTION, SOLUTION INTRAMUSCULAR; INTRAVENOUS; SUBCUTANEOUS
Status: DISCONTINUED | OUTPATIENT
Start: 2024-07-31 | End: 2024-08-01 | Stop reason: HOSPADM

## 2024-07-31 RX ADMIN — FENTANYL CITRATE 50 MCG: 50 INJECTION, SOLUTION INTRAMUSCULAR; INTRAVENOUS at 11:33

## 2024-07-31 RX ADMIN — MIDAZOLAM HYDROCHLORIDE 2 MG: 1 INJECTION, SOLUTION INTRAMUSCULAR; INTRAVENOUS at 11:19

## 2024-07-31 RX ADMIN — FENTANYL CITRATE 50 MCG: 50 INJECTION, SOLUTION INTRAMUSCULAR; INTRAVENOUS at 11:21

## 2024-07-31 RX ADMIN — FENTANYL CITRATE 50 MCG: 50 INJECTION, SOLUTION INTRAMUSCULAR; INTRAVENOUS at 11:41

## 2024-07-31 RX ADMIN — MIDAZOLAM HYDROCHLORIDE 2 MG: 1 INJECTION, SOLUTION INTRAMUSCULAR; INTRAVENOUS at 11:29

## 2024-07-31 RX ADMIN — FENTANYL CITRATE 50 MCG: 50 INJECTION, SOLUTION INTRAMUSCULAR; INTRAVENOUS at 11:26

## 2024-07-31 RX ADMIN — MIDAZOLAM HYDROCHLORIDE 2 MG: 1 INJECTION, SOLUTION INTRAMUSCULAR; INTRAVENOUS at 11:24

## 2024-07-31 RX ADMIN — MIDAZOLAM HYDROCHLORIDE 2 MG: 1 INJECTION, SOLUTION INTRAMUSCULAR; INTRAVENOUS at 11:36

## 2024-07-31 NOTE — IR NOTE
Patient Name: Christiano Garcia  Medical Record Number: [id number]  Today's Date: 7/31/2024    Procedure: CT sedation  Proceduralist: Dr. Gonzalez    Sedation medications administered: 8 mg midazolam and 200 mcg fentanyl   Sedation time: 30 minutes

## 2024-07-31 NOTE — DISCHARGE INSTRUCTIONS
* Recovery After Conscious Sedation (Adult)  We gave you medicine by vein to make you sleepy or relaxed during your procedure. This may have included both a pain medicine and sleeping medicine. Most of the effects have worn off. But you may still feel sleepy for the next 6 to 8 hours.  Home care  Follow these guidelines when you get home:  You may feel sleepy and clumsy and have poor balance for the next few hours.  A responsible adult should stay with you for the next 8 hours. This person should make sure your condition doesn t get worse.  Don't drink any alcohol for the next 24 hours.  Don't drive, operate dangerous machinery, make important business or personal decisions or sign legal documents during the next 24 hours.  You may vomit (throw up) if you eat too soon after the procedure. If this happens, drink small amounts of water, juice or clear broth. Wait to try solid food until you no longer have nausea (upset stomach).  Note: Your care team may tell you not to take any medicine by mouth for pain or sleep in the next 4 hours. These medicines may react with the medicines you had in the hospital. This could cause a much stronger response than usual.  Follow-up care  Follow up with your care team if you are not alert and back to your usual level of activity within 12 hours.  When to seek medical advice  Call your care team right away if any of these occur:  You still feel sleepy or clumsy after 12 hours, or your sleepiness gets worse  Weakness or dizziness gets worse  Repeated vomiting  If you can't be woken up and someone is staying with you, they should call 911.  For informational purposes only. Not to replace the advice of your health care provider.  Copyright   2018 [employer]. All rights reserved.

## 2024-07-31 NOTE — PRE-PROCEDURE
GENERAL PRE-PROCEDURE:     Written consent obtained?: Yes    Risks and benefits: Risks, benefits and alternatives were discussed    Consent given by:  Patient  Patient states understanding of procedure being performed: Yes    Patient's understanding of procedure matches consent: Yes    Procedure consent matches procedure scheduled: Yes    Expected level of sedation:  Moderate  Appropriately NPO:  Yes  ASA Class:  2  Mallampati  :  Grade 3- soft palate visible, posterior pharyngeal wall not visible  Lungs:  Lungs clear with good breath sounds bilaterally  Heart:  Normal heart sounds and rate  History & Physical reviewed:  History and physical reviewed and no updates needed  Statement of review:  I have reviewed the lab findings, diagnostic data, medications, and the plan for sedation

## 2024-08-01 ENCOUNTER — TELEPHONE (OUTPATIENT)
Dept: OTOLARYNGOLOGY | Facility: CLINIC | Age: 54
End: 2024-08-01
Payer: COMMERCIAL

## 2024-08-01 DIAGNOSIS — R51.9 FACIAL PAIN: ICD-10-CM

## 2024-08-01 DIAGNOSIS — J32.9 CHRONIC SINUSITIS, UNSPECIFIED LOCATION: Primary | ICD-10-CM

## 2024-08-01 RX ORDER — PREDNISONE 10 MG/1
10 TABLET ORAL 2 TIMES DAILY
Qty: 28 TABLET | Refills: 0 | Status: SHIPPED | OUTPATIENT
Start: 2024-08-01 | End: 2024-08-15

## 2024-08-07 ENCOUNTER — MEDICAL CORRESPONDENCE (OUTPATIENT)
Dept: HEALTH INFORMATION MANAGEMENT | Facility: CLINIC | Age: 54
End: 2024-08-07
Payer: COMMERCIAL

## 2024-08-07 ENCOUNTER — TRANSFERRED RECORDS (OUTPATIENT)
Dept: HEALTH INFORMATION MANAGEMENT | Facility: CLINIC | Age: 54
End: 2024-08-07
Payer: COMMERCIAL

## 2024-08-12 NOTE — PROGRESS NOTES
Assessment & Plan         We discussed that sinus surgery has potential risks and complications:    Failure to resolve symptoms  Pain  Bleeding and/or scar formation  Damage to the eyes or base of the skull (rare)  Loss of sense of smell or taste  The need for additional procedures and/or medical consultations  Empty nose syndrome (excessive dryness and crusting)    Patient would like to move forward with the procedure.  After recovery if facial issues continue would refer to p/t for TMJ.        Problem List Items Addressed This Visit    None  Visit Diagnoses       Chronic maxillary sinusitis    -  Primary    Relevant Orders    Case Request: FUNCTIONAL ENDOSCOPIC Right maxillary SINUS SURGERY, WITH BALLOON SINUPLASTY, WITH IMAGING GUIDANCE (Completed)    Facial pressure                    30 minutes spent on the date of the encounter doing chart review, history and exam, documentation and further activities per the note  {     [facility]    Subjective     HPI-Follow up sinus. CT Sinus done 7/31. Last SNOT = 57. Today is 70    Ct:  IMPRESSION:  1.  Scattered paranasal sinus mucosal thickening without evidence of acute sinusitis. Obstruction of the right maxillary sinus ostium and stenosis of bilateral frontoethmoidal recess. The remaining paranasal sinus drainage pathways are patent.  2.  1.1 cm bony defect along the floor of the right maxillary sinus.    Tx'd with prednisone.    Audio-high freq hearing loss is stable compared to January.        Last visit:  Assessment & Plan  Needs sinus CT under conscious sedation.   Discussed with [facility] CT tech and scheduling.    Start astelin.       NEERAJ for oral surgery imaging as can't find images in Epic.     Right Ear pain/fullness,hearing loss- Tymp and ear exam were normal today. may simply be referred pain  s/p sinus surgery hx and new sinus issues.  Audio scheduled for next week. Could be TMJ component as well but  will work up sinuses first.        No changes with treatment regimen- continues with right sided facial pain and pressure from the  cheek and eye brow over o the right jaw.  .  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  His regular psych meds are on hold pending   these procedures.   Had preop done 7/29.      Review of Systems   ENT as above      Objective    There were no vitals taken for this visit.    Physical Exam     General_ appears well.

## 2024-08-20 ENCOUNTER — OFFICE VISIT (OUTPATIENT)
Dept: OTOLARYNGOLOGY | Facility: CLINIC | Age: 54
End: 2024-08-20
Payer: COMMERCIAL

## 2024-08-20 DIAGNOSIS — R44.8 FACIAL PRESSURE: ICD-10-CM

## 2024-08-20 DIAGNOSIS — J32.0 CHRONIC MAXILLARY SINUSITIS: Primary | ICD-10-CM

## 2024-08-20 PROCEDURE — 99214 OFFICE O/P EST MOD 30 MIN: CPT | Performed by: PHYSICIAN ASSISTANT

## 2024-08-20 NOTE — LETTER
8/20/2024      Christiano Garcia  [address]  [place] [address]      Dear Colleague,    Thank you for referring your patient, Christiano Garcia, to the [facility]. Please see a copy of my visit note below.    Assessment & Plan        We discussed that sinus surgery has potential risks and complications:    Failure to resolve symptoms  Pain  Bleeding and/or scar formation  Damage to the eyes or base of the skull (rare)  Loss of sense of smell or taste  The need for additional procedures and/or medical consultations  Empty nose syndrome (excessive dryness and crusting)    Patient would like to move forward with the procedure.  After recovery if facial issues continue would refer to p/t for TMJ.        Problem List Items Addressed This Visit    None  Visit Diagnoses       Chronic maxillary sinusitis    -  Primary    Relevant Orders    Case Request: FUNCTIONAL ENDOSCOPIC Right maxillary SINUS SURGERY, WITH BALLOON SINUPLASTY, WITH IMAGING GUIDANCE (Completed)    Facial pressure                    30 minutes spent on the date of the encounter doing chart review, history and exam, documentation and further activities per the note  {     DULCE Lawson  [facility]    Subjective     HPI-Follow up sinus. CT Sinus done 7/31. Last SNOT = 57. Today is 70    Ct:  IMPRESSION:  1.  Scattered paranasal sinus mucosal thickening without evidence of acute sinusitis. Obstruction of the right maxillary sinus ostium and stenosis of bilateral frontoethmoidal recess. The remaining paranasal sinus drainage pathways are patent.  2.  1.1 cm bony defect along the floor of the right maxillary sinus.    Tx'd with prednisone.    Audio-high freq hearing loss is stable compared to January.        Last visit:  Assessment & Plan  Needs sinus CT under conscious sedation.   Discussed with [facility] CT tech and scheduling.    Start astelin.       NEERAJ for oral surgery imaging as can't find images in  Epic.     Right Ear pain/fullness,hearing loss- Tymp and ear exam were normal today. may simply be referred pain  s/p sinus surgery hx and new sinus issues.  Audio scheduled for next week. Could be TMJ component as well but will work up sinuses first.        No changes with treatment regimen- continues with right sided facial pain and pressure from the  cheek and eye brow over o the right jaw.  .  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  His regular psych meds are on hold pending   these procedures.   Had preop done 7/29.      Review of Systems   ENT as above      Objective    There were no vitals taken for this visit.    Physical Exam     General_ appears well.           Again, thank you for allowing me to participate in the care of your patient.        Sincerely,        DULCE Lawson

## 2024-08-21 ENCOUNTER — TELEPHONE (OUTPATIENT)
Dept: OTOLARYNGOLOGY | Facility: CLINIC | Age: 54
End: 2024-08-21
Payer: COMMERCIAL

## 2024-08-21 NOTE — LETTER
Pre-op Physical: Schedule a pre-op physical with your primary care doctor if not internal to [facility].  If internal, we have scheduled this.   The pre-op physical must be 10-30 days before surgery and since it is required by anesthesia, your surgery will be cancelled if it's not done.      Surgery Date: 9/3/2024     Location: [place] [address]    Approximate Arrival Time: 10:30 am  (Unless instructed differently by the pre-op call nurse)     Post op Appointment: 10/4/2024 at  8:40 am with  Dr. Amezcua  [facility] [address] [place] [address] [address], [place] [address].    Pre-Surgical Tasks:     Review all medications with your primary care or prescribing physician; they will advise you which meds to stop and when, and when you can resume taking.  Certain medications like blood thinners and weight loss medications need to be stopped in advance of surgery to proceed safely.      Blood thinners including but not exclusive to drugs like Xarelto, Eliquis, Warfarin and Aspirin, should be stopped five days before surgery, if your prescribing provider agrees. Follow your provider's advice on stopping blood thinners because they know you best.  If you are unsure if your medication is a blood thinner, ask your prescribing provider.    Weight loss medications: There are multiple medications being used for weight management and diabetes today, and the list is growing.  Phentermine, Ozempic, Wegovy, Trulicity, and other similar medications need to be stopped one week before surgery to avoid being cancelled.  Victoza and Saxenda can be continued longer but must be stopped one full day before surgery.  Please ask your prescribing provider for advice.    Diabetic medications: in addition to the medications talked about above that are used for either weight loss or diabetes, some people are on insulin that may require  adjustment.  Please discuss managing diabetic medications with your prescribing doctor as these medications may require modification prior to surgery.     Please shower the evening before and morning of surgery with Hibiclens soap.  Any [facility] can provide this to you at no cost, or it can be found at your local pharmacy.     Fasting instructions will be provided by the pre-op nurse who will call you 1-3 days before surgery.  Typically, we advise normal food up to 8 hours before you arrive for surgery. Clear liquids only from then until 2 hours before you arrive surgery, then nothing at all by mouth.  The nurse will review your specific instructions with you at the call.      Smoking impacts your body's ability to heal properly so we advise patients to quit if possible before surgery.  Plastic Surgery patients are required to be nicotine free for at least 8 weeks before surgery.      You will need an adult to drive you home and stay with you 24 hours after surgery. Public transportation or Medical Van Services are not permitted.    Visitor restrictions are subject to change, please verify with the pre-op nurse when they call how many people are permitted to accompany you.    We always encourage you to notify your insurance any time you have medical tests or procedures scheduled including surgery. The number is usually right on the back of your insurance card. To obtain pricing for surgery, please call [facility] Cost of Care at [phone] or email [email or web address].        Call our office if you have any questions! Thank you!     Yesi Marti MA  [facility]   (General Surgery/ ENT/ Plastics)  Direct Office: [phone]

## 2024-08-21 NOTE — TELEPHONE ENCOUNTER
Spoke with patient today regarding surgery scheduling      Went over details/instructions.    Surgery Letter sent via [employer]  (Please see LETTERS TAB in chart to retrieve a copy of this letter)

## 2024-08-23 ENCOUNTER — TRANSFERRED RECORDS (OUTPATIENT)
Dept: HEALTH INFORMATION MANAGEMENT | Facility: CLINIC | Age: 54
End: 2024-08-23
Payer: COMMERCIAL

## 2024-08-30 RX ORDER — LANSOPRAZOLE 30 MG/1
60 CAPSULE, DELAYED RELEASE ORAL 2 TIMES DAILY
COMMUNITY

## 2024-08-30 RX ORDER — ACETAMINOPHEN 500 MG
1000 TABLET ORAL 3 TIMES DAILY PRN
COMMUNITY

## 2024-08-30 RX ORDER — ALPRAZOLAM 0.5 MG
0.5 TABLET ORAL 4 TIMES DAILY PRN
COMMUNITY

## 2024-08-30 RX ORDER — IBUPROFEN 600 MG/1
600 TABLET, FILM COATED ORAL EVERY 6 HOURS PRN
COMMUNITY

## 2024-09-03 ENCOUNTER — ANESTHESIA EVENT (OUTPATIENT)
Dept: SURGERY | Facility: HOSPITAL | Age: 54
End: 2024-09-03
Payer: COMMERCIAL

## 2024-09-03 ENCOUNTER — HOSPITAL ENCOUNTER (OUTPATIENT)
Facility: HOSPITAL | Age: 54
Discharge: HOME | End: 2024-09-03
Attending: OTOLARYNGOLOGY | Admitting: OTOLARYNGOLOGY
Payer: COMMERCIAL

## 2024-09-03 ENCOUNTER — ANESTHESIA (OUTPATIENT)
Dept: SURGERY | Facility: HOSPITAL | Age: 54
End: 2024-09-03
Payer: COMMERCIAL

## 2024-09-03 VITALS
DIASTOLIC BLOOD PRESSURE: 72 MMHG | RESPIRATION RATE: 18 BRPM | BODY MASS INDEX: 28.52 KG/M2 | SYSTOLIC BLOOD PRESSURE: 122 MMHG | HEART RATE: 63 BPM | TEMPERATURE: 97 F | WEIGHT: 228.2 LBS | OXYGEN SATURATION: 96 %

## 2024-09-03 DIAGNOSIS — Z98.890 S/P FESS (FUNCTIONAL ENDOSCOPIC SINUS SURGERY): Primary | ICD-10-CM

## 2024-09-03 PROCEDURE — 370N000017 HC ANESTHESIA TECHNICAL FEE, PER MIN: Performed by: OTOLARYNGOLOGY

## 2024-09-03 PROCEDURE — 250N000013 HC RX MED GY IP 250 OP 250 PS 637: Performed by: OTOLARYNGOLOGY

## 2024-09-03 PROCEDURE — 710N000012 HC RECOVERY PHASE 2, PER MINUTE: Performed by: OTOLARYNGOLOGY

## 2024-09-03 PROCEDURE — 360N000076 HC SURGERY LEVEL 3, PER MIN: Performed by: OTOLARYNGOLOGY

## 2024-09-03 PROCEDURE — 31295 NSL/SINS NDSC SURG MAX SINS: CPT | Mod: RT | Performed by: OTOLARYNGOLOGY

## 2024-09-03 PROCEDURE — 250N000011 HC RX IP 250 OP 636: Performed by: ANESTHESIOLOGY

## 2024-09-03 PROCEDURE — 710N000009 HC RECOVERY PHASE 1, LEVEL 1, PER MIN: Performed by: OTOLARYNGOLOGY

## 2024-09-03 PROCEDURE — 31295 NSL/SINS NDSC SURG MAX SINS: CPT | Performed by: NURSE ANESTHETIST, CERTIFIED REGISTERED

## 2024-09-03 PROCEDURE — 31295 NSL/SINS NDSC SURG MAX SINS: CPT | Performed by: ANESTHESIOLOGY

## 2024-09-03 PROCEDURE — 250N000013 HC RX MED GY IP 250 OP 250 PS 637: Performed by: ANESTHESIOLOGY

## 2024-09-03 PROCEDURE — C1726 CATH, BAL DIL, NON-VASCULAR: HCPCS | Performed by: OTOLARYNGOLOGY

## 2024-09-03 PROCEDURE — 258N000003 HC RX IP 258 OP 636: Performed by: ANESTHESIOLOGY

## 2024-09-03 PROCEDURE — 31296 NSL/SINS NDSC SURG FRNT SINS: CPT | Mod: RT | Performed by: OTOLARYNGOLOGY

## 2024-09-03 PROCEDURE — 258N000003 HC RX IP 258 OP 636: Performed by: NURSE ANESTHETIST, CERTIFIED REGISTERED

## 2024-09-03 PROCEDURE — 272N000001 HC OR GENERAL SUPPLY STERILE: Performed by: OTOLARYNGOLOGY

## 2024-09-03 PROCEDURE — 250N000009 HC RX 250: Performed by: OTOLARYNGOLOGY

## 2024-09-03 PROCEDURE — 250N000011 HC RX IP 250 OP 636: Performed by: NURSE ANESTHETIST, CERTIFIED REGISTERED

## 2024-09-03 PROCEDURE — 250N000025 HC SEVOFLURANE, PER MIN: Performed by: OTOLARYNGOLOGY

## 2024-09-03 PROCEDURE — 250N000009 HC RX 250: Performed by: NURSE ANESTHETIST, CERTIFIED REGISTERED

## 2024-09-03 PROCEDURE — 999N000141 HC STATISTIC PRE-PROCEDURE NURSING ASSESSMENT: Performed by: OTOLARYNGOLOGY

## 2024-09-03 RX ORDER — ONDANSETRON 4 MG/1
4 TABLET, ORALLY DISINTEGRATING ORAL EVERY 30 MIN PRN
Status: DISCONTINUED | OUTPATIENT
Start: 2024-09-03 | End: 2024-09-03 | Stop reason: HOSPADM

## 2024-09-03 RX ORDER — SODIUM CHLORIDE, SODIUM LACTATE, POTASSIUM CHLORIDE, CALCIUM CHLORIDE 600; 310; 30; 20 MG/100ML; MG/100ML; MG/100ML; MG/100ML
INJECTION, SOLUTION INTRAVENOUS CONTINUOUS
Status: DISCONTINUED | OUTPATIENT
Start: 2024-09-03 | End: 2024-09-03 | Stop reason: HOSPADM

## 2024-09-03 RX ORDER — FENTANYL CITRATE 50 UG/ML
25 INJECTION, SOLUTION INTRAMUSCULAR; INTRAVENOUS EVERY 5 MIN PRN
Status: DISCONTINUED | OUTPATIENT
Start: 2024-09-03 | End: 2024-09-03 | Stop reason: HOSPADM

## 2024-09-03 RX ORDER — OXYMETAZOLINE HYDROCHLORIDE 0.05 G/100ML
2 SPRAY NASAL ONCE
Status: COMPLETED | OUTPATIENT
Start: 2024-09-03 | End: 2024-09-03

## 2024-09-03 RX ORDER — FENTANYL CITRATE 50 UG/ML
INJECTION, SOLUTION INTRAMUSCULAR; INTRAVENOUS PRN
Status: DISCONTINUED | OUTPATIENT
Start: 2024-09-03 | End: 2024-09-03

## 2024-09-03 RX ORDER — LIDOCAINE HYDROCHLORIDE 10 MG/ML
INJECTION, SOLUTION INFILTRATION; PERINEURAL PRN
Status: DISCONTINUED | OUTPATIENT
Start: 2024-09-03 | End: 2024-09-03

## 2024-09-03 RX ORDER — FENTANYL CITRATE 50 UG/ML
50 INJECTION, SOLUTION INTRAMUSCULAR; INTRAVENOUS EVERY 5 MIN PRN
Status: DISCONTINUED | OUTPATIENT
Start: 2024-09-03 | End: 2024-09-03 | Stop reason: HOSPADM

## 2024-09-03 RX ORDER — DEXAMETHASONE SODIUM PHOSPHATE 10 MG/ML
INJECTION, SOLUTION INTRAMUSCULAR; INTRAVENOUS PRN
Status: DISCONTINUED | OUTPATIENT
Start: 2024-09-03 | End: 2024-09-03

## 2024-09-03 RX ORDER — ONDANSETRON 2 MG/ML
4 INJECTION INTRAMUSCULAR; INTRAVENOUS EVERY 30 MIN PRN
Status: DISCONTINUED | OUTPATIENT
Start: 2024-09-03 | End: 2024-09-03 | Stop reason: HOSPADM

## 2024-09-03 RX ORDER — LIDOCAINE 40 MG/G
CREAM TOPICAL
Status: DISCONTINUED | OUTPATIENT
Start: 2024-09-03 | End: 2024-09-03 | Stop reason: HOSPADM

## 2024-09-03 RX ORDER — PROPOFOL 10 MG/ML
INJECTION, EMULSION INTRAVENOUS PRN
Status: DISCONTINUED | OUTPATIENT
Start: 2024-09-03 | End: 2024-09-03

## 2024-09-03 RX ORDER — ACETAMINOPHEN 80 MG
1 TABLET,CHEWABLE ORAL 2 TIMES DAILY
Qty: 28 PACKET | Refills: 0 | Status: SHIPPED | OUTPATIENT
Start: 2024-09-03 | End: 2024-09-17

## 2024-09-03 RX ORDER — LIDOCAINE HYDROCHLORIDE AND EPINEPHRINE 10; 10 MG/ML; UG/ML
INJECTION, SOLUTION INFILTRATION; PERINEURAL PRN
Status: DISCONTINUED | OUTPATIENT
Start: 2024-09-03 | End: 2024-09-03 | Stop reason: HOSPADM

## 2024-09-03 RX ORDER — OXYCODONE HYDROCHLORIDE 5 MG/1
5 TABLET ORAL
Status: DISCONTINUED | OUTPATIENT
Start: 2024-09-03 | End: 2024-09-03 | Stop reason: HOSPADM

## 2024-09-03 RX ORDER — ACETAMINOPHEN 325 MG/1
975 TABLET ORAL ONCE
Status: COMPLETED | OUTPATIENT
Start: 2024-09-03 | End: 2024-09-03

## 2024-09-03 RX ORDER — DEXAMETHASONE SODIUM PHOSPHATE 4 MG/ML
4 INJECTION, SOLUTION INTRA-ARTICULAR; INTRALESIONAL; INTRAMUSCULAR; INTRAVENOUS; SOFT TISSUE
Status: DISCONTINUED | OUTPATIENT
Start: 2024-09-03 | End: 2024-09-03 | Stop reason: HOSPADM

## 2024-09-03 RX ORDER — OXYCODONE AND ACETAMINOPHEN 5; 325 MG/1; MG/1
1-2 TABLET ORAL ONCE
Status: COMPLETED | OUTPATIENT
Start: 2024-09-03 | End: 2024-09-03

## 2024-09-03 RX ORDER — HYDROMORPHONE HCL IN WATER/PF 6 MG/30 ML
0.4 PATIENT CONTROLLED ANALGESIA SYRINGE INTRAVENOUS EVERY 5 MIN PRN
Status: DISCONTINUED | OUTPATIENT
Start: 2024-09-03 | End: 2024-09-03 | Stop reason: HOSPADM

## 2024-09-03 RX ORDER — DEXAMETHASONE SODIUM PHOSPHATE 10 MG/ML
10 INJECTION, SOLUTION INTRAMUSCULAR; INTRAVENOUS ONCE
Status: DISCONTINUED | OUTPATIENT
Start: 2024-09-03 | End: 2024-09-03 | Stop reason: HOSPADM

## 2024-09-03 RX ORDER — NALOXONE HYDROCHLORIDE 0.4 MG/ML
0.1 INJECTION, SOLUTION INTRAMUSCULAR; INTRAVENOUS; SUBCUTANEOUS
Status: DISCONTINUED | OUTPATIENT
Start: 2024-09-03 | End: 2024-09-03 | Stop reason: HOSPADM

## 2024-09-03 RX ORDER — FENTANYL CITRATE 50 UG/ML
25 INJECTION, SOLUTION INTRAMUSCULAR; INTRAVENOUS
Status: DISCONTINUED | OUTPATIENT
Start: 2024-09-03 | End: 2024-09-03 | Stop reason: HOSPADM

## 2024-09-03 RX ORDER — ONDANSETRON 2 MG/ML
INJECTION INTRAMUSCULAR; INTRAVENOUS PRN
Status: DISCONTINUED | OUTPATIENT
Start: 2024-09-03 | End: 2024-09-03

## 2024-09-03 RX ORDER — OXYCODONE AND ACETAMINOPHEN 5; 325 MG/1; MG/1
1-2 TABLET ORAL EVERY 6 HOURS PRN
Qty: 6 TABLET | Refills: 0 | Status: SHIPPED | OUTPATIENT
Start: 2024-09-03

## 2024-09-03 RX ORDER — OXYMETAZOLINE HYDROCHLORIDE 0.05 G/100ML
SPRAY NASAL PRN
Status: DISCONTINUED | OUTPATIENT
Start: 2024-09-03 | End: 2024-09-03 | Stop reason: HOSPADM

## 2024-09-03 RX ORDER — HYDROMORPHONE HCL IN WATER/PF 6 MG/30 ML
0.2 PATIENT CONTROLLED ANALGESIA SYRINGE INTRAVENOUS EVERY 5 MIN PRN
Status: DISCONTINUED | OUTPATIENT
Start: 2024-09-03 | End: 2024-09-03 | Stop reason: HOSPADM

## 2024-09-03 RX ADMIN — HYDROMORPHONE HYDROCHLORIDE 0.4 MG: 0.2 INJECTION, SOLUTION INTRAMUSCULAR; INTRAVENOUS; SUBCUTANEOUS at 16:33

## 2024-09-03 RX ADMIN — OXYMETAZOLINE HYDROCHLORIDE 2 SPRAY: 0.05 SPRAY NASAL at 12:15

## 2024-09-03 RX ADMIN — OXYCODONE HYDROCHLORIDE AND ACETAMINOPHEN 2 TABLET: 5; 325 TABLET ORAL at 17:34

## 2024-09-03 RX ADMIN — SODIUM CHLORIDE, POTASSIUM CHLORIDE, SODIUM LACTATE AND CALCIUM CHLORIDE: 600; 310; 30; 20 INJECTION, SOLUTION INTRAVENOUS at 12:15

## 2024-09-03 RX ADMIN — FENTANYL CITRATE 100 MCG: 50 INJECTION INTRAMUSCULAR; INTRAVENOUS at 14:28

## 2024-09-03 RX ADMIN — ACETAMINOPHEN 975 MG: 325 TABLET ORAL at 12:14

## 2024-09-03 RX ADMIN — PHENYLEPHRINE HYDROCHLORIDE 200 MCG: 10 INJECTION INTRAVENOUS at 14:45

## 2024-09-03 RX ADMIN — DEXAMETHASONE SODIUM PHOSPHATE 10 MG: 10 INJECTION, SOLUTION INTRAMUSCULAR; INTRAVENOUS at 14:34

## 2024-09-03 RX ADMIN — FENTANYL CITRATE 50 MCG: 50 INJECTION, SOLUTION INTRAMUSCULAR; INTRAVENOUS at 15:50

## 2024-09-03 RX ADMIN — PROPOFOL 20 MG: 10 INJECTION, EMULSION INTRAVENOUS at 15:27

## 2024-09-03 RX ADMIN — LIDOCAINE HYDROCHLORIDE 5 ML: 10 INJECTION, SOLUTION INFILTRATION; PERINEURAL at 14:28

## 2024-09-03 RX ADMIN — SUGAMMADEX 200 MG: 100 INJECTION, SOLUTION INTRAVENOUS at 15:18

## 2024-09-03 RX ADMIN — ROCURONIUM BROMIDE 50 MG: 50 INJECTION, SOLUTION INTRAVENOUS at 14:28

## 2024-09-03 RX ADMIN — PHENYLEPHRINE HYDROCHLORIDE 100 MCG: 10 INJECTION INTRAVENOUS at 15:13

## 2024-09-03 RX ADMIN — FENTANYL CITRATE 50 MCG: 50 INJECTION, SOLUTION INTRAMUSCULAR; INTRAVENOUS at 16:03

## 2024-09-03 RX ADMIN — ONDANSETRON 4 MG: 2 INJECTION INTRAMUSCULAR; INTRAVENOUS at 14:53

## 2024-09-03 RX ADMIN — MIDAZOLAM 2 MG: 1 INJECTION INTRAMUSCULAR; INTRAVENOUS at 14:21

## 2024-09-03 RX ADMIN — HYDROMORPHONE HYDROCHLORIDE 0.4 MG: 0.2 INJECTION, SOLUTION INTRAMUSCULAR; INTRAVENOUS; SUBCUTANEOUS at 16:50

## 2024-09-03 RX ADMIN — DEXMEDETOMIDINE HYDROCHLORIDE 8 MCG: 100 INJECTION, SOLUTION INTRAVENOUS at 14:22

## 2024-09-03 RX ADMIN — PROPOFOL 250 MG: 10 INJECTION, EMULSION INTRAVENOUS at 14:28

## 2024-09-03 RX ADMIN — MIDAZOLAM 2 MG: 1 INJECTION INTRAMUSCULAR; INTRAVENOUS at 14:26

## 2024-09-03 RX ADMIN — HYDROMORPHONE HYDROCHLORIDE 0.4 MG: 0.2 INJECTION, SOLUTION INTRAMUSCULAR; INTRAVENOUS; SUBCUTANEOUS at 16:17

## 2024-09-03 ASSESSMENT — LIFESTYLE VARIABLES: TOBACCO_USE: 1

## 2024-09-03 ASSESSMENT — ACTIVITIES OF DAILY LIVING (ADL)
ADLS_ACUITY_SCORE: 38
ADLS_ACUITY_SCORE: 37
ADLS_ACUITY_SCORE: 35
ADLS_ACUITY_SCORE: 37
ADLS_ACUITY_SCORE: 37
ADLS_ACUITY_SCORE: 38
ADLS_ACUITY_SCORE: 38

## 2024-09-03 NOTE — OP NOTE
Otolaryngology Operative Note    Date of Operation:  9/3/2024        Pre-operative Diagnosis:  1. Right sided Facial Pain 2. Chronic Sinusitis (Right Frontal and Maxillary)  Post-operative Diagnosis:  same  Procedure(s):  1. Endoscopic sinus surgery with Balloon Dilation of RIGHT frontal and RIGHT maxillary sinuses    Surgeon:  Jesús Amezcua MD  Assistant(s):  None  Anesthesia:  General     Indications for Procedure:  The risks and the benefits of the procedure were discussed with the patient. A consent form was then signed and placed into the chart.    Procedure in Detail:  The patient was brought into the operating room and placed on the table in a supine position. Anesthesia intubated without any difficulties. A time out was performed with all pertinent personnel in the room. The bed was then rotated 90 degrees.  The image guidance system is registered and checked for accuracy.     The nasal septum is injected with 1.5 ml of 1% Lidocaine with Epinephrine at 1:1000,000.   Afrin soaked nasal pledgets were then placed bilaterally     After 5 minutes, the pledgets are removed.        The RIGHT middle turbinate is injected with 3 ml of 1% lidocaine with epinephrine at 1:100,000.      Attention was then turned to the RIGHT maxillary sinus.   It was dilated up to 12 2 CHANCE with good effect after location with a Fabi wire.   The sinus was than lavaged with a total of 60 ml sterile saline.     On theRIGHT side the frontal sinus was indentified using a FABI wire.   The outflow tract was then dilated sequentailly x3 (each dilation was to 12 CHANCE for 30 seconds).     Posi-sept packing was then cut in half placed into each middle meatus inflated with saline.    An OG tube was then used to empty the esophagus of secretions.     The patient tolerated the procedure well without incident.     Findings: 1. No active sinus disease 2. Polypoid hypertrophy of right interior and middle turbinates                              EBL:  2  mL    Complications:  none    Disposition: The patient was extubated in the operating room and was transferred to the PACU in stable condition.       Jesús Amezcua MD

## 2024-09-03 NOTE — ANESTHESIA CARE TRANSFER NOTE
Patient: Christiano Garcia    Procedure: Procedure(s):  FUNCTIONAL ENDOSCOPIC Right maxillary and Right Frontal SINUS SURGERY, WITH BALLOON SINUPLASTY.       Diagnosis: Chronic maxillary sinusitis [J32.0]  Diagnosis Additional Information: No value filed.    Anesthesia Type:   General     Note:    Oropharynx: oropharynx clear of all foreign objects and spontaneously breathing  Level of Consciousness: awake and drowsy  Oxygen Supplementation: face mask  Level of Supplemental Oxygen (L/min / FiO2): 6  Independent Airway: airway patency satisfactory and stable  Dentition: dentition unchanged  Vital Signs Stable: post-procedure vital signs reviewed and stable  Report to RN Given: handoff report given  Patient transferred to: PACU    Handoff Report: Identifed the Patient, Identified the Reponsible Provider, Reviewed the pertinent medical history, Discussed the surgical course, Reviewed Intra-OP anesthesia mangement and issues during anesthesia, Set expectations for post-procedure period and Allowed opportunity for questions and acknowledgement of understanding  Vitals:  Vitals Value Taken Time   /90 09/03/24 1533   Temp     Pulse 76 09/03/24 1537   Resp 15 09/03/24 1537   SpO2 98 % 09/03/24 1537   Vitals shown include unfiled device data.    Electronically Signed By: SO Mccord CRNA  September 3, 2024  3:38 PM

## 2024-09-03 NOTE — DISCHARGE INSTRUCTIONS
Start nasal saline (Jesús Med Bottle) irrigations the day of surgery (2x daily for the first week, then 1xdaily for the next 2 weeks)  Avoid nose blowing until your first post operative visit  Sleep with head elevated on several pillows for the first 3 nights to help with swelling  Follow up appointment as scheduled   Bring operative photos to your first post operative appointment for review

## 2024-09-03 NOTE — ANESTHESIA PREPROCEDURE EVALUATION
Anesthesia Pre-Procedure Evaluation    Patient: Christiano Garcia   MRN: [id number] : 1970        Procedure : Procedure(s):  FUNCTIONAL ENDOSCOPIC Right maxillary SINUS SURGERY, WITH BALLOON SINUPLASTY, WITH IMAGING GUIDANCE          Past Medical History:   Diagnosis Date    Anxiety and depression     Bipolar disease, chronic (H)     CAD (coronary artery disease)     s/p MI    Chest pain     Encounter for screening for endocrine disorder     Esophagitis     Feeding disorder associated with insult to gastrointestinal tract     GERD (gastroesophageal reflux disease)     h/o Acute intermittent porphyria     per patient, doubt raised by GI during 2013 admission    Headache     Hemoptysis     History of colonic polyps     Hypercholesteremia     Insomnia     Jaw mass     Kidney stones     Lingular pneumonia     Pain of maxillary sinus     PUD (peptic ulcer disease)     Stress at home     Tobacco use disorder, severe, dependence     Tooth pain       Past Surgical History:   Procedure Laterality Date    BRONCHOSCOPY (RIGID OR FLEXIBLE), DIAGNOSTIC N/A 2023    Procedure: BRONCHOALVEOLAR LAVAGE;  Surgeon: Rochelle Burks MD;  Location: [facility] OR    BRONCHOSCOPY FLEXIBLE AND RIGID N/A 2023    Procedure: BRONCHOSCOPY,  Airway Inspection;  Surgeon: Rochelle Burks MD;  Location: [facility] OR    CV CORONARY ANGIOGRAM N/A 2023    Procedure: Coronary Angiogram;  Surgeon: Heriberto Flowers MD;  Location:  [facility]    ESOPHAGOSCOPY, GASTROSCOPY, DUODENOSCOPY (EGD), COMBINED  2013    Procedure: COMBINED ESOPHAGOSCOPY, GASTROSCOPY, DUODENOSCOPY (EGD), BIOPSY SINGLE OR MULTIPLE;  EGD with cold biopsy for h pylori;  Surgeon: Jen Galo MD;  Location:  GI    ESOPHAGOSCOPY, GASTROSCOPY, DUODENOSCOPY (EGD), COMBINED  3/8/2013    Procedure: COMBINED ESOPHAGOSCOPY, GASTROSCOPY, DUODENOSCOPY (EGD);  Esophagoscopy,Gastroscopy,Duodenoscopy- Room 504;  Surgeon: Jimenez King DO;  Location:  GI  "   LAPAROTOMY EXPLORATORY        Allergies   Allergen Reactions    Cats       Social History     Tobacco Use    Smoking status: Former     Current packs/day: 0.00     Average packs/day: 0.5 packs/day for 5.0 years (2.5 ttl pk-yrs)     Types: Cigarettes     Quit date: 2024     Years since quittin.0    Smokeless tobacco: Never    Tobacco comments:     patient smokes \"to raise his blood pressure\" but very rarely when he is not hospitalized   Substance Use Topics    Alcohol use: No      Wt Readings from Last 1 Encounters:   24 103.5 kg (228 lb 3.2 oz)        Anesthesia Evaluation   Pt has had prior anesthetic.     No history of anesthetic complications       ROS/MED HX  ENT/Pulmonary:     (+)                tobacco use,      asthma                  Neurologic:  - neg neurologic ROS     Cardiovascular:     (+) Dyslipidemia - -  CAD -  - -                                 Previous cardiac testing   Echo: Date: 18 Results:  Interpretation Summary  Patient achieved 84% of MPHR.  There were no ST segment changes observed with stress.  Normal resting wall motion and no stress-induced wall motion abnormality.  This test indicates a low probability of severe occlusive coronary artery  disease. There is no comparison study available    Stress Test:  Date: Results:    ECG Reviewed:  Date: Results:    Cath:  Date: Results:      METS/Exercise Tolerance:     Hematologic:  - neg hematologic  ROS     Musculoskeletal:  - neg musculoskeletal ROS     GI/Hepatic:     (+) GERD, Asymptomatic on medication,                  Renal/Genitourinary:  - neg Renal ROS     Endo:  - neg endo ROS     Psychiatric/Substance Use:     (+) psychiatric history anxiety, depression and bipolar       Infectious Disease:  - neg infectious disease ROS     Malignancy:  - neg malignancy ROS     Other:  - neg other ROS          Physical Exam    Airway  airway exam normal      Mallampati: II   TM distance: > 3 FB   Neck ROM: full   Mouth " "opening: > 3 cm    Respiratory Devices and Support         Dental  no notable dental history     (+) Modest Abnormalities - crowns, retainers, 1 or 2 missing teeth      Cardiovascular   cardiovascular exam normal       Rhythm and rate: regular and normal     Pulmonary   pulmonary exam normal        breath sounds clear to auscultation           OUTSIDE LABS:  CBC:   Lab Results   Component Value Date    WBC 11.0 07/28/2023    WBC 10.2 07/13/2023    HGB 13.8 07/28/2023    HGB 13.3 07/13/2023    HCT 41.5 07/28/2023    HCT 40.4 07/13/2023     07/28/2023     07/13/2023     BMP:   Lab Results   Component Value Date     07/28/2023     07/13/2023    POTASSIUM 4.1 07/28/2023    POTASSIUM 4.2 07/13/2023    CHLORIDE 108 (H) 07/28/2023    CHLORIDE 107 07/13/2023    CO2 22 07/28/2023    CO2 19 (L) 07/13/2023    BUN 12.2 07/28/2023    BUN 12.2 07/13/2023    CR 0.93 07/28/2023    CR 0.82 07/13/2023    GLC 96 07/28/2023    GLC 84 07/13/2023     COAGS:   Lab Results   Component Value Date    PTT 28 06/07/2023    INR 0.98 07/28/2023    FIBR 322 06/06/2023     POC: No results found for: \"BGM\", \"HCG\", \"HCGS\"  HEPATIC:   Lab Results   Component Value Date    ALBUMIN 4.2 07/13/2023    PROTTOTAL 6.7 07/13/2023    ALT 25 07/13/2023    AST 33 07/13/2023    ALKPHOS 86 07/13/2023    BILITOTAL 0.2 07/13/2023     OTHER:   Lab Results   Component Value Date    LACT 1.8 08/14/2013    TEJAL 9.2 07/28/2023    PHOS 3.6 05/11/2013    MAG 2.5 (H) 05/11/2013    LIPASE 88 12/19/2013    AMYLASE 70 06/21/2010    CRP <5.0 06/21/2010    SED 13 06/07/2023       Anesthesia Plan    ASA Status:  3    NPO Status:  NPO Appropriate    Anesthesia Type: General.     - Airway: ETT   Induction: Intravenous, Propofol.   Maintenance: Balanced.        Consents    Anesthesia Plan(s) and associated risks, benefits, and realistic alternatives discussed. Questions answered and patient/representative(s) expressed understanding.     - Discussed:     - " Discussed with:  Patient            Postoperative Care    Pain management: IV analgesics, Oral pain medications, Multi-modal analgesia.   PONV prophylaxis: Ondansetron (or other 5HT-3), Dexamethasone or Solumedrol, Droperidol or Haldol     Comments:               Bryan Deng MD    I have reviewed the pertinent notes and labs in the chart from the past 30 days and (re)examined the patient.  Any updates or changes from those notes are reflected in this note.

## 2024-09-03 NOTE — ANESTHESIA PROCEDURE NOTES
Airway       Patient location during procedure: OR       Procedure Start/Stop Times: 9/3/2024 2:36 PM  Staff -        CRNA: Uzma Nicholas APRN CRNA       Performed By: CRNA  Consent for Airway        Urgency: elective  Indications and Patient Condition       Indications for airway management: liya-procedural       Induction type:intravenous       Mask difficulty assessment: 1 - vent by mask    Final Airway Details       Final airway type: endotracheal airway       Successful airway: Oral and MATTHEW  Endotracheal Airway Details        ETT size (mm): 8.0       Cuffed: yes       Cuff volume (mL): 5       Successful intubation technique: direct laryngoscopy       DL Blade Type: MAC 3       Grade View of Cords: 3 (suggest mac 4 or johnson)       Adjucts: stylet       Position: Left       Measured from: gums/teeth       Secured at (cm): 24       Bite block used: Soft    Post intubation assessment        Placement verified by: capnometry, equal breath sounds and chest rise        Number of attempts at approach: 1       Number of other approaches attempted: 0       Secured with: tape       Ease of procedure: easy       Dentition: Intact    Medication(s) Administered   Medication Administration Time: 9/3/2024 2:36 PM

## 2024-09-04 ENCOUNTER — TELEPHONE (OUTPATIENT)
Dept: OTOLARYNGOLOGY | Facility: CLINIC | Age: 54
End: 2024-09-04
Payer: COMMERCIAL

## 2024-09-04 NOTE — TELEPHONE ENCOUNTER
Left detailed message for pt. I did not see any documentation of a stent or splint being placed during surgery but will verify with Dr. Amezcua tomorrow. If he does have either, we will accommodate him in the appropriate time frame and fit him in if needed. Regarding constipation- I let him know that anesthesia and percocet both can slow down his bowels and that he should ensure he is having adequate oral hydration, eating fiber rich diet, ambulating, and can take an OTC like Miralax if needed.   I let him know that I would follow up with him tomorrow.  Eliana Thakkar RN on 9/4/2024 at 4:15 PM

## 2024-09-04 NOTE — ANESTHESIA POSTPROCEDURE EVALUATION
Patient: Christiano Garcia    Procedure: Procedure(s):  FUNCTIONAL ENDOSCOPIC Right maxillary and Right Frontal SINUS SURGERY, WITH BALLOON SINUPLASTY.       Anesthesia Type:  General    Note:  Disposition: Outpatient   Postop Pain Control: Uneventful            Sign Out: Well controlled pain   PONV: No   Neuro/Psych: Uneventful            Sign Out: Acceptable/Baseline neuro status   Airway/Respiratory: Uneventful            Sign Out: Acceptable/Baseline resp. status   CV/Hemodynamics: Uneventful            Sign Out: Acceptable CV status; No obvious hypovolemia; No obvious fluid overload   Other NRE: NONE   DID A NON-ROUTINE EVENT OCCUR? No           Last vitals:  Vitals Value Taken Time   /95 09/03/24 1700   Temp 36.1  C (96.9  F) 09/03/24 1700   Pulse 60 09/03/24 1719   Resp 13 09/03/24 1710   SpO2 97 % 09/03/24 1719   Vitals shown include unfiled device data.    Electronically Signed By: Hannah Randolph MD  September 3, 2024  8:32 PM

## 2024-09-04 NOTE — TELEPHONE ENCOUNTER
[facility] Call Center    Phone Message    May a detailed message be left on voicemail: yes     Reason for Call: Other: Pt would like a call to discuss his recent discharge. Would like to know when his stents should be removed. Also states he has not been able to pass any bowels and would like an rx for a stimulant/softener sent to pharmacy on file Please call. Thanks.     Action Taken: Other: ENT    Travel Screening: Not Applicable     Date of Service:

## 2024-09-05 NOTE — TELEPHONE ENCOUNTER
Verified with Dr. Amezcua that pt did not have a splint or stent placed during surgery. Notified pt that he should plan to keep his appointment next month for a follow up. He also reported that he had a BM today. He has no other concerns at this time and will call back if needed before his follow up.  Eliana Thakkar RN on 9/5/2024 at 1:01 PM

## 2024-09-06 ENCOUNTER — MYC MEDICAL ADVICE (OUTPATIENT)
Dept: OTOLARYNGOLOGY | Facility: CLINIC | Age: 54
End: 2024-09-06
Payer: COMMERCIAL

## 2024-09-06 DIAGNOSIS — M26.609 TMJ (TEMPOROMANDIBULAR JOINT SYNDROME): Primary | ICD-10-CM

## 2024-09-06 NOTE — TELEPHONE ENCOUNTER
"DOS: 9/3/24    Procedure: FESS    Pt reports he feels very plugged. Felt pain in eye down to cheek. Pulled out large blood clot from nose this morning.   Has some on and off bleeding throughout the day (3 times, thin bright red blood). Reports pain 6-7/10 sharp. Has not used Percocet yet. Has only been using Tylenol prn. Took 600mg Advil \"the other day.\" He felt feverish yesterday and today but took Tylenol and is feeling better. Tolerating a regular diet. Making urine and having Bms.  Recommend saline washes BID, elevate head, ice.   Encouraged to take Percocet 1-2 tabs every 6 hours prn.   Go to the ER or call 911 or have group home staff call 911 for shortness of breath, chest pain, difficulty breathing, excessive or worsening bleeding, other signs of infection.   Eliana Thakkar RN on 9/6/2024 at 4:43 PM    "

## 2024-09-10 ENCOUNTER — OFFICE VISIT (OUTPATIENT)
Dept: OTOLARYNGOLOGY | Facility: CLINIC | Age: 54
End: 2024-09-10
Payer: COMMERCIAL

## 2024-09-10 DIAGNOSIS — M54.2 CERVICALGIA: ICD-10-CM

## 2024-09-10 DIAGNOSIS — G89.18 ACUTE POST-OPERATIVE PAIN: ICD-10-CM

## 2024-09-10 DIAGNOSIS — M26.609 TMJ (TEMPOROMANDIBULAR JOINT SYNDROME): Primary | ICD-10-CM

## 2024-09-10 PROCEDURE — 99212 OFFICE O/P EST SF 10 MIN: CPT | Performed by: PHYSICIAN ASSISTANT

## 2024-09-10 ASSESSMENT — PATIENT HEALTH QUESTIONNAIRE - PHQ9: SUM OF ALL RESPONSES TO PHQ QUESTIONS 1-9: 17

## 2024-09-10 NOTE — LETTER
9/10/2024      Christiano Garcia  [address]  [place] [address]      Dear Colleague,    Thank you for referring your patient, Christiano Garcia, to the [facility]. Please see a copy of my visit note below.    Assessment & Plan  Benign exam today.  Only 1 week out from procedure so symptoms not entirely unexpected.  I spine referral for neck and TMJ. Follow up with oral surgery for next steps for OAC.  Continue as needed tylenol and NSAID.      Scope deferred due to patient anxiety/claustrophobia.      Problem List Items Addressed This Visit    None  Visit Diagnoses       TMJ (temporomandibular joint syndrome)    -  Primary    Acute post-operative pain        Cervicalgia                  20 minutes spent on the date of the encounter doing chart review, history and exam, documentation and further activities per the note  {     DULCE Lawson  [facility]    Subjective     HPI-    Patient had a right sided FESS  a week ago.  Few days after started developing recurrence of right-sided facial pain with some bloody discharge and clots.  Using nasal saline BID.      Gabapentin- is taking.      TMJ- amenable to referral. Neck aloo stiff,     Oral surgery- not scheduled yet.     Continues to f/up with psychiatric team with whom he meets most days-  getting ketamine infusions weekly.        Review of Systems   ENT as above      Objective    There were no vitals taken for this visit.    Physical Exam     NOSE:     Dorsum:   straight  Septum:  midline  Mucosa:  moist  Turbinates: clear  No crusting or clots seen on right with rhinoscope,      JAW- TTP to inferior aspect of both TMJs, no crepitus,  Neck  + occipital TTP b/l           Again, thank you for allowing me to participate in the care of your patient.        Sincerely,        DULCE Lawson

## 2024-09-10 NOTE — NURSING NOTE
Depression Screening Follow-up        9/10/2024     1:35 PM   PHQ   PHQ-9 Total Score 17   Q9: Thoughts of better off dead/self-harm past 2 weeks Nearly every day              No data to display                  Follow Up   Pt live in group home. Former [employer], has PTSD. Has a daughter currently in critical condition at [facility].   Takes Wellbutrin, Seroquel, Ketamine, Xanax, Ambien, for depression/anxiety. Pt will follow up with his current mental health team (outside of  system). Does not have active self harm plan.         Eliana Thakkar, RN

## 2024-09-10 NOTE — PATIENT INSTRUCTIONS
Temporomandibular Disorder: Care Instructions  Overview     Temporomandibular disorders (TMDs) are problems with the muscles and joints that connect your jaw to your skull. These problems cause pain when you talk, chew, swallow, or yawn. You may feel this pain on one or both sides.  TMDs are often caused by tight jaw muscles. The tightness can be caused by clenching or grinding your teeth.  Lowering stress may help relax your jaw and reduce your pain. Your doctor may suggest a dental splint. Splints can help protect the teeth from grinding and clenching.  You may be able to do some things at home to feel better. If that doesn't work for you, your doctor may prescribe medicine to help relax your muscles and control the pain.  Follow-up care is a key part of your treatment and safety. Be sure to make and go to all appointments, and call your doctor if you are having problems. It's also a good idea to know your test results and keep a list of the medicines you take.  How can you care for yourself at home?  Put an ice pack or a warm, moist cloth on your jaw for 15 minutes. Do this several times a day. Try switching back and forth between moist heat and cold.  Search for [facility] Youtube videos on TMJ exercises you can do at home.    Ask your doctor if you can take an over-the-counter pain medicine, such as acetaminophen (Tylenol), ibuprofen (Advil, Motrin), or naproxen (Aleve). Be safe with medicines. Read and follow all instructions on the label.  Choose softer foods, such as eggs, yogurt, soup, or pureed foods. Try to avoid hard foods. Cut food into small pieces.  If it doesn't cause pain, practice relaxing your jaw. Gently open and close your mouth. Move your jaw straight up and down. Do this for a few minutes every morning and evening. Watching yourself in a mirror can help.  Have good posture. Try to line up your ears, shoulders, and hips when sitting and standing.  Learn to manage your stress.  "Try:  Relaxation techniques. These may include taking slow, deep breaths, and mindful meditation. It may include progressive muscle relaxation, yoga, catracho chi, and qi gong.  Getting at least 30 minutes of exercise on most days to relieve stress. Try walking.  Try not to:  Hold a phone between your shoulder and your jaw.  Open your mouth all the way, like when you sing loudly or yawn.  Clench or grind your teeth, bite your lips, or chew your fingernails.  Clench things between your teeth, such as pens, pipes, or cigars.  When should you call for help?   Call your doctor now or seek immediate medical care if:    Your jaw is locked open or shut or it is hard to move your jaw.   Watch closely for changes in your health, and be sure to contact your doctor if:    Your jaw pain gets worse.     Your face is swollen.     You do not get better as expected.   Where can you learn more?  Go to https://www.OpenAgent.com.au.net/patiented  Enter P868 in the search box to learn more about \"Temporomandibular Disorder: Care Instructions.\"  Current as of: August 6, 2023               Content Version: 14.0    9604-0084 [employer].   Care instructions adapted under license by your healthcare professional. If you have questions about a medical condition or this instruction, always ask your healthcare professional. [employer] disclaims any warranty or liability for your use of this information.   "

## 2024-09-10 NOTE — PROGRESS NOTES
Assessment & Plan   Benign exam today.  Only 1 week out from procedure so symptoms not entirely unexpected.  I spine referral for neck and TMJ. Follow up with oral surgery for next steps for OAC.  Continue as needed tylenol and NSAID.      Scope deferred due to patient anxiety/claustrophobia.      Problem List Items Addressed This Visit    None  Visit Diagnoses       TMJ (temporomandibular joint syndrome)    -  Primary    Acute post-operative pain        Cervicalgia                  20 minutes spent on the date of the encounter doing chart review, history and exam, documentation and further activities per the note  {     DULCE Lawson  [facility]    Subjective     HPI-    Patient had a right sided FESS  a week ago.  Few days after started developing recurrence of right-sided facial pain with some bloody discharge and clots.  Using nasal saline BID.      Gabapentin- is taking.      TMJ- amenable to referral. Neck aloo stiff,     Oral surgery- not scheduled yet.     Continues to f/up with psychiatric team with whom he meets most days-  getting ketamine infusions weekly.        Review of Systems   ENT as above      Objective    There were no vitals taken for this visit.    Physical Exam     NOSE:     Dorsum:   straight  Septum:  midline  Mucosa:  moist  Turbinates: clear  No crusting or clots seen on right with rhinoscope,      JAW- TTP to inferior aspect of both TMJs, no crepitus,  Neck  + occipital TTP b/l

## 2024-09-25 ENCOUNTER — MEDICAL CORRESPONDENCE (OUTPATIENT)
Dept: HEALTH INFORMATION MANAGEMENT | Facility: CLINIC | Age: 54
End: 2024-09-25
Payer: COMMERCIAL

## 2024-09-30 NOTE — OR NURSING
"RN doing post-op phone calls, patient stated that he was very confused with follow-up appointment. \"I was told my follow-up appointment would be in 1-week by Dr. Amezcua but my discharge instructions states that my follow-up appointment is October 4th.\"     RN double checked AVS and this information is correct regarding post-op appointment of October 4, RN transferred call to Dr. Amezcua and helped patient get connected to Dr. Amezcua.  sent out message to Dr. Amezcua to call back patient.   " lumbar spine

## 2024-10-04 ENCOUNTER — TELEPHONE (OUTPATIENT)
Dept: OTOLARYNGOLOGY | Facility: CLINIC | Age: 54
End: 2024-10-04

## 2024-10-04 NOTE — TELEPHONE ENCOUNTER
[facility] Call Center    Phone Message    May a detailed message be left on voicemail: yes     Reason for Call: Other: Patient ride from [facility] did not show patient woud like to reschedule post op. Please reach out thank you      Action Taken: Other: ENT    Travel Screening: Not Applicable     Date of Service:

## 2024-11-11 ENCOUNTER — TRANSFERRED RECORDS (OUTPATIENT)
Dept: HEALTH INFORMATION MANAGEMENT | Facility: CLINIC | Age: 54
End: 2024-11-11

## 2025-01-01 ENCOUNTER — TRANSFERRED RECORDS (OUTPATIENT)
Dept: MULTI SPECIALTY CLINIC | Facility: CLINIC | Age: 55
End: 2025-01-01

## 2025-01-23 ENCOUNTER — TRANSCRIBE ORDERS (OUTPATIENT)
Dept: OTHER | Age: 55
End: 2025-01-23

## 2025-01-23 DIAGNOSIS — R04.2 HEMOPTYSIS: Primary | ICD-10-CM

## 2025-01-23 DIAGNOSIS — J47.1 BRONCHIECTASIS WITH ACUTE EXACERBATION (H): ICD-10-CM

## 2025-01-24 NOTE — Clinical Note
Future Appointments 2/26/2025  3:00 PM    [facility] PFT RM 2              [facility]              Beam 2/26/2025  4:00 PM    Singh King MD  [facility]              Beam

## 2025-01-27 ENCOUNTER — TELEPHONE (OUTPATIENT)
Dept: PULMONOLOGY | Facility: CLINIC | Age: 55
End: 2025-01-27
Payer: COMMERCIAL

## 2025-01-27 NOTE — PROGRESS NOTES
Patient being seen in bronchiectasis clinic at [facility] instead of referral to gen pulm dr. King.

## 2025-01-27 NOTE — TELEPHONE ENCOUNTER
Left message letting patient know that his appointment for PFT & to see Dr. King is cancelled. He has now been scheduled at the bronchiectasis clinic at the [facility]. The Referral should have originally been sent to Jason for this.

## 2025-01-28 NOTE — CONFIDENTIAL NOTE
RECORDS RECEIVED FROM: internal / ce    DATE RECEIVED: 2.20.25    NOTES STATUS DETAILS   OFFICE NOTE from referring provider internal    [place] NP      MEDICATION LIST internal     IMAGING  (NEED IMAGES AND REPORTS)     CT SCAN Ce  [facility]- 11.11.24    CHEST XRAY (CXR) Ce / internal  [facility]- 10.25.23, 9.6.23,     internal - 7.13.23, 6.6.23   TESTS     PULMONARY FUNCTION TESTING (PFT) internal  Scheduled 2.20.25

## 2025-02-20 ENCOUNTER — PRE VISIT (OUTPATIENT)
Dept: PULMONOLOGY | Facility: CLINIC | Age: 55
End: 2025-02-20

## 2025-02-20 DIAGNOSIS — J47.1 BRONCHIECTASIS WITH ACUTE EXACERBATION (H): Primary | ICD-10-CM

## 2025-02-20 NOTE — PROGRESS NOTES
PFTs deferred at this time per Dr. Franklin due to patient recent hemoptysis. Dr. Franklin will see patient and reassess if patient needs PFTs today or if PFTs need to be rescheduled.    Christopher Spencer, RRT

## 2025-02-24 ENCOUNTER — TRANSFERRED RECORDS (OUTPATIENT)
Dept: HEALTH INFORMATION MANAGEMENT | Facility: CLINIC | Age: 55
End: 2025-02-24
Payer: COMMERCIAL

## 2025-02-25 ENCOUNTER — MEDICAL CORRESPONDENCE (OUTPATIENT)
Dept: HEALTH INFORMATION MANAGEMENT | Facility: CLINIC | Age: 55
End: 2025-02-25
Payer: COMMERCIAL

## 2025-02-27 ENCOUNTER — TRANSCRIBE ORDERS (OUTPATIENT)
Dept: OTHER | Age: 55
End: 2025-02-27

## 2025-02-27 DIAGNOSIS — R04.2 HEMOPTYSIS: Primary | ICD-10-CM

## 2025-03-03 ENCOUNTER — PATIENT OUTREACH (OUTPATIENT)
Dept: ONCOLOGY | Facility: CLINIC | Age: 55
End: 2025-03-03
Payer: COMMERCIAL

## 2025-03-03 NOTE — PROGRESS NOTES
New Patient: Interventional Pulmonary (Lung nodule) Nurse Navigator Note     Referring provider:   Gisella Radford affiliated with [facility] [address] clinic at [place] .     Referred to (specialty): Interventional Pulmonary (Lung nodule)    Date Referral Received:   2/27/25     Evaluation for :  Hemoptysis     Clinical History (per Nurse review of records provided):    Actual faxed referral is not accessible.  Patient has been seen by pulmonary team with [facility] for New Bronchiectas and hemoptysis.  I reached out to Dr. Franklin, pulmonary, who does not feel patient needs to see IP at this time.  Plan is for him to follow up with pulmonary in 2 months.  No recent CT imaging.      I called in attempt to reach Christiano to address above.  There was no answer so I left message.    Vi Young, RN, BSN  Oncology New Patient Nurse Navigator   [facility]

## 2025-03-25 ENCOUNTER — ANCILLARY ORDERS (OUTPATIENT)
Dept: MRI IMAGING | Facility: HOSPITAL | Age: 55
End: 2025-03-25
Payer: COMMERCIAL

## 2025-03-25 DIAGNOSIS — M54.50 LOW BACK PAIN, UNSPECIFIED: ICD-10-CM

## 2025-03-25 DIAGNOSIS — M54.2 CERVICALGIA: Primary | ICD-10-CM

## 2025-04-02 RX ORDER — SODIUM CHLORIDE 0.65 %
1 AEROSOL, SPRAY (ML) NASAL DAILY PRN
COMMUNITY
Start: 2024-06-21

## 2025-04-02 RX ORDER — ONDANSETRON 4 MG/1
4 TABLET, FILM COATED ORAL EVERY 6 HOURS PRN
COMMUNITY
Start: 2025-01-16

## 2025-04-03 ENCOUNTER — ANESTHESIA EVENT (OUTPATIENT)
Dept: SURGERY | Facility: CLINIC | Age: 55
End: 2025-04-03
Payer: COMMERCIAL

## 2025-04-03 ENCOUNTER — HOSPITAL ENCOUNTER (OUTPATIENT)
Dept: MRI IMAGING | Facility: CLINIC | Age: 55
Discharge: HOME | End: 2025-04-03
Attending: PREVENTIVE MEDICINE
Payer: COMMERCIAL

## 2025-04-03 ENCOUNTER — HOSPITAL ENCOUNTER (OUTPATIENT)
Facility: CLINIC | Age: 55
Discharge: HOME | End: 2025-04-03
Attending: PEDIATRICS | Admitting: ANESTHESIOLOGY
Payer: COMMERCIAL

## 2025-04-03 ENCOUNTER — ANESTHESIA (OUTPATIENT)
Dept: SURGERY | Facility: CLINIC | Age: 55
End: 2025-04-03
Payer: COMMERCIAL

## 2025-04-03 VITALS
HEART RATE: 62 BPM | HEIGHT: 74 IN | WEIGHT: 229.3 LBS | SYSTOLIC BLOOD PRESSURE: 127 MMHG | TEMPERATURE: 97 F | BODY MASS INDEX: 29.43 KG/M2 | DIASTOLIC BLOOD PRESSURE: 97 MMHG | OXYGEN SATURATION: 98 % | RESPIRATION RATE: 11 BRPM

## 2025-04-03 DIAGNOSIS — M54.50 LOW BACK PAIN, UNSPECIFIED: ICD-10-CM

## 2025-04-03 DIAGNOSIS — M54.2 CERVICALGIA: ICD-10-CM

## 2025-04-03 PROCEDURE — 710N000009 HC RECOVERY PHASE 1, LEVEL 1, PER MIN

## 2025-04-03 PROCEDURE — 999N000141 HC STATISTIC PRE-PROCEDURE NURSING ASSESSMENT

## 2025-04-03 PROCEDURE — 258N000003 HC RX IP 258 OP 636: Performed by: ANESTHESIOLOGY

## 2025-04-03 PROCEDURE — 250N000025 HC SEVOFLURANE, PER MIN

## 2025-04-03 PROCEDURE — 72148 MRI LUMBAR SPINE W/O DYE: CPT

## 2025-04-03 PROCEDURE — 72141 MRI NECK SPINE W/O DYE: CPT

## 2025-04-03 PROCEDURE — 250N000011 HC RX IP 250 OP 636: Performed by: NURSE ANESTHETIST, CERTIFIED REGISTERED

## 2025-04-03 PROCEDURE — 370N000017 HC ANESTHESIA TECHNICAL FEE, PER MIN

## 2025-04-03 PROCEDURE — 710N000012 HC RECOVERY PHASE 2, PER MINUTE

## 2025-04-03 RX ORDER — HYDRALAZINE HYDROCHLORIDE 20 MG/ML
2.5-5 INJECTION INTRAMUSCULAR; INTRAVENOUS EVERY 10 MIN PRN
Status: DISCONTINUED | OUTPATIENT
Start: 2025-04-03 | End: 2025-04-03 | Stop reason: HOSPADM

## 2025-04-03 RX ORDER — FENTANYL CITRATE 50 UG/ML
INJECTION, SOLUTION INTRAMUSCULAR; INTRAVENOUS PRN
Status: DISCONTINUED | OUTPATIENT
Start: 2025-04-03 | End: 2025-04-03

## 2025-04-03 RX ORDER — NALOXONE HYDROCHLORIDE 0.4 MG/ML
0.1 INJECTION, SOLUTION INTRAMUSCULAR; INTRAVENOUS; SUBCUTANEOUS
Status: DISCONTINUED | OUTPATIENT
Start: 2025-04-03 | End: 2025-04-03 | Stop reason: HOSPADM

## 2025-04-03 RX ORDER — DEXAMETHASONE SODIUM PHOSPHATE 4 MG/ML
4 INJECTION, SOLUTION INTRA-ARTICULAR; INTRALESIONAL; INTRAMUSCULAR; INTRAVENOUS; SOFT TISSUE
Status: DISCONTINUED | OUTPATIENT
Start: 2025-04-03 | End: 2025-04-03 | Stop reason: HOSPADM

## 2025-04-03 RX ORDER — ONDANSETRON 2 MG/ML
4 INJECTION INTRAMUSCULAR; INTRAVENOUS EVERY 30 MIN PRN
Status: DISCONTINUED | OUTPATIENT
Start: 2025-04-03 | End: 2025-04-03 | Stop reason: HOSPADM

## 2025-04-03 RX ORDER — OXYCODONE HYDROCHLORIDE 5 MG/1
10 TABLET ORAL
Status: DISCONTINUED | OUTPATIENT
Start: 2025-04-03 | End: 2025-04-03 | Stop reason: HOSPADM

## 2025-04-03 RX ORDER — SODIUM CHLORIDE, SODIUM LACTATE, POTASSIUM CHLORIDE, CALCIUM CHLORIDE 600; 310; 30; 20 MG/100ML; MG/100ML; MG/100ML; MG/100ML
INJECTION, SOLUTION INTRAVENOUS CONTINUOUS
Status: DISCONTINUED | OUTPATIENT
Start: 2025-04-03 | End: 2025-04-03 | Stop reason: HOSPADM

## 2025-04-03 RX ORDER — FENTANYL CITRATE 50 UG/ML
50 INJECTION, SOLUTION INTRAMUSCULAR; INTRAVENOUS EVERY 5 MIN PRN
Status: DISCONTINUED | OUTPATIENT
Start: 2025-04-03 | End: 2025-04-03 | Stop reason: HOSPADM

## 2025-04-03 RX ORDER — FENTANYL CITRATE 50 UG/ML
25 INJECTION, SOLUTION INTRAMUSCULAR; INTRAVENOUS
Status: DISCONTINUED | OUTPATIENT
Start: 2025-04-03 | End: 2025-04-03 | Stop reason: HOSPADM

## 2025-04-03 RX ORDER — OXYCODONE HYDROCHLORIDE 5 MG/1
5 TABLET ORAL
Status: DISCONTINUED | OUTPATIENT
Start: 2025-04-03 | End: 2025-04-03 | Stop reason: HOSPADM

## 2025-04-03 RX ORDER — ONDANSETRON 4 MG/1
4 TABLET, ORALLY DISINTEGRATING ORAL EVERY 30 MIN PRN
Status: DISCONTINUED | OUTPATIENT
Start: 2025-04-03 | End: 2025-04-03 | Stop reason: HOSPADM

## 2025-04-03 RX ORDER — PROPOFOL 10 MG/ML
INJECTION, EMULSION INTRAVENOUS PRN
Status: DISCONTINUED | OUTPATIENT
Start: 2025-04-03 | End: 2025-04-03

## 2025-04-03 RX ORDER — ONDANSETRON 2 MG/ML
INJECTION INTRAMUSCULAR; INTRAVENOUS PRN
Status: DISCONTINUED | OUTPATIENT
Start: 2025-04-03 | End: 2025-04-03

## 2025-04-03 RX ORDER — MEPERIDINE HYDROCHLORIDE 25 MG/ML
12.5 INJECTION INTRAMUSCULAR; INTRAVENOUS; SUBCUTANEOUS EVERY 5 MIN PRN
Status: DISCONTINUED | OUTPATIENT
Start: 2025-04-03 | End: 2025-04-03 | Stop reason: HOSPADM

## 2025-04-03 RX ORDER — ALBUTEROL SULFATE 0.83 MG/ML
2.5 SOLUTION RESPIRATORY (INHALATION) EVERY 4 HOURS PRN
Status: DISCONTINUED | OUTPATIENT
Start: 2025-04-03 | End: 2025-04-03 | Stop reason: HOSPADM

## 2025-04-03 RX ORDER — FENTANYL CITRATE 50 UG/ML
25 INJECTION, SOLUTION INTRAMUSCULAR; INTRAVENOUS EVERY 5 MIN PRN
Status: DISCONTINUED | OUTPATIENT
Start: 2025-04-03 | End: 2025-04-03 | Stop reason: HOSPADM

## 2025-04-03 RX ORDER — DEXAMETHASONE SODIUM PHOSPHATE 4 MG/ML
INJECTION, SOLUTION INTRA-ARTICULAR; INTRALESIONAL; INTRAMUSCULAR; INTRAVENOUS; SOFT TISSUE PRN
Status: DISCONTINUED | OUTPATIENT
Start: 2025-04-03 | End: 2025-04-03

## 2025-04-03 RX ORDER — LABETALOL HYDROCHLORIDE 5 MG/ML
10 INJECTION, SOLUTION INTRAVENOUS
Status: DISCONTINUED | OUTPATIENT
Start: 2025-04-03 | End: 2025-04-03 | Stop reason: HOSPADM

## 2025-04-03 RX ORDER — LIDOCAINE 40 MG/G
CREAM TOPICAL
Status: DISCONTINUED | OUTPATIENT
Start: 2025-04-03 | End: 2025-04-03 | Stop reason: HOSPADM

## 2025-04-03 RX ADMIN — FENTANYL CITRATE 25 MCG: 50 INJECTION INTRAMUSCULAR; INTRAVENOUS at 07:30

## 2025-04-03 RX ADMIN — MIDAZOLAM 2 MG: 1 INJECTION INTRAMUSCULAR; INTRAVENOUS at 07:13

## 2025-04-03 RX ADMIN — PROPOFOL 50 MG: 10 INJECTION, EMULSION INTRAVENOUS at 07:19

## 2025-04-03 RX ADMIN — FENTANYL CITRATE 50 MCG: 50 INJECTION INTRAMUSCULAR; INTRAVENOUS at 07:19

## 2025-04-03 RX ADMIN — SODIUM CHLORIDE, SODIUM LACTATE, POTASSIUM CHLORIDE, AND CALCIUM CHLORIDE: .6; .31; .03; .02 INJECTION, SOLUTION INTRAVENOUS at 07:00

## 2025-04-03 RX ADMIN — DEXAMETHASONE SODIUM PHOSPHATE 8 MG: 4 INJECTION, SOLUTION INTRA-ARTICULAR; INTRALESIONAL; INTRAMUSCULAR; INTRAVENOUS; SOFT TISSUE at 07:19

## 2025-04-03 RX ADMIN — FENTANYL CITRATE 25 MCG: 50 INJECTION INTRAMUSCULAR; INTRAVENOUS at 07:45

## 2025-04-03 RX ADMIN — ONDANSETRON 4 MG: 2 INJECTION INTRAMUSCULAR; INTRAVENOUS at 07:19

## 2025-04-03 ASSESSMENT — ACTIVITIES OF DAILY LIVING (ADL)
ADLS_ACUITY_SCORE: 44

## 2025-04-03 ASSESSMENT — LIFESTYLE VARIABLES: TOBACCO_USE: 1

## 2025-04-03 NOTE — ANESTHESIA PROCEDURE NOTES
Airway       Patient location during procedure: OR (mri)  Staff -        CRNA: Niya Mckenna APRN CRNA       Performed By: CRNAIndications and Patient Condition       Indications for airway management: liya-procedural       Induction type:intravenous       Mask difficulty assessment: 1 - vent by mask    Final Airway Details       Final airway type: supraglottic airway    Supraglottic Airway Details        Type: LMA       Brand: I-Gel       LMA size: 5    Post intubation assessment        Placement verified by: capnometry, equal breath sounds and chest rise        Number of attempts at approach: 1       Number of other approaches attempted: 0       Secured with: commercial tube bean       Ease of procedure: easy       Dentition: Intact and Unchanged

## 2025-04-03 NOTE — DISCHARGE INSTRUCTIONS
Maximum acetaminophen (Tylenol) dose from all sources should not exceed 4 grams (4000 mg) per day.

## 2025-04-03 NOTE — ANESTHESIA CARE TRANSFER NOTE
Patient: Christiano Garcia    Procedure: Procedure(s):  MRI of Cervical Spine without contrast and MRI Lumbar Spine without contrast       Diagnosis: Cervicalgia [M54.2]  Low back pain [M54.50]  Diagnosis Additional Information: No value filed.    Anesthesia Type:   General     Note:    Oropharynx: oral airway in place  Level of Consciousness: awake and drowsy  Oxygen Supplementation: blow-by O2  Level of Supplemental Oxygen (L/min / FiO2): 8  Independent Airway: airway patency satisfactory and stable  Dentition: dentition unchanged  Vital Signs Stable: post-procedure vital signs reviewed and stable  Report to RN Given: handoff report given  Patient transferred to: PACU    Handoff Report: Identifed the Patient, Identified the Reponsible Provider, Reviewed the pertinent medical history, Discussed the surgical course, Reviewed Intra-OP anesthesia mangement and issues during anesthesia, Set expectations for post-procedure period and Allowed opportunity for questions and acknowledgement of understanding      Vitals:  Vitals Value Taken Time   /76 04/03/25 0825   Temp     Pulse 63 04/03/25 0827   Resp 9 04/03/25 0827   SpO2 100 % 04/03/25 0827   Vitals shown include unfiled device data.    Electronically Signed By: SO Damian CRNA  April 3, 2025  8:29 AM

## 2025-04-03 NOTE — ANESTHESIA POSTPROCEDURE EVALUATION
Patient: Christiano Garcia    Procedure: Procedure(s):  MRI of Cervical Spine without contrast and MRI Lumbar Spine without contrast       Anesthesia Type:  General    Note:  Disposition: Inpatient   Postop Pain Control: Uneventful            Sign Out: Well controlled pain   PONV: No   Neuro/Psych: Uneventful            Sign Out: Acceptable/Baseline neuro status   Airway/Respiratory: Uneventful            Sign Out: Acceptable/Baseline resp. status   CV/Hemodynamics: Uneventful            Sign Out: Acceptable CV status; No obvious hypovolemia; No obvious fluid overload   Other NRE: NONE   DID A NON-ROUTINE EVENT OCCUR? No           Last vitals:  Vitals Value Taken Time   /84 04/03/25 0830   Temp 96.9  F (36.1  C) 04/03/25 0820   Pulse 76 04/03/25 0830   Resp 15 04/03/25 0830   SpO2 97 % 04/03/25 0830       Electronically Signed By: Jorge Max MD  April 3, 2025  2:25 PM

## 2025-04-15 ENCOUNTER — TELEPHONE (OUTPATIENT)
Dept: PULMONOLOGY | Facility: CLINIC | Age: 55
End: 2025-04-15
Payer: COMMERCIAL

## 2025-04-15 NOTE — TELEPHONE ENCOUNTER
Patient reports hemoptysis episodes where he is coughing up approximately 1oz of blood. He has had 3 episodes this week and approximately 4-5 last week. He is requesting a bronchoscopy ordered by Dr. Franklin. Patient lives in a group home and the protocol is that any time he coughs blood, EMS is notified. He has explained that this is an ongoing symptom related to his lung condition and opts out of going to the emergency department. Writer will discuss with Dr. Franklin and follow up with patient regarding next steps. Patient verbalizes agreement to this plan.

## 2025-05-01 ENCOUNTER — OFFICE VISIT (OUTPATIENT)
Dept: PULMONOLOGY | Facility: CLINIC | Age: 55
End: 2025-05-01
Attending: INTERNAL MEDICINE
Payer: COMMERCIAL

## 2025-05-01 VITALS
HEIGHT: 74 IN | DIASTOLIC BLOOD PRESSURE: 78 MMHG | WEIGHT: 236.2 LBS | OXYGEN SATURATION: 96 % | SYSTOLIC BLOOD PRESSURE: 108 MMHG | HEART RATE: 100 BPM | BODY MASS INDEX: 30.31 KG/M2

## 2025-05-01 DIAGNOSIS — R04.2 HEMOPTYSIS: Primary | ICD-10-CM

## 2025-05-01 PROCEDURE — G0463 HOSPITAL OUTPT CLINIC VISIT: HCPCS | Performed by: INTERNAL MEDICINE

## 2025-05-01 ASSESSMENT — PAIN SCALES - GENERAL: PAINLEVEL_OUTOF10: NO PAIN (0)

## 2025-05-01 NOTE — PROGRESS NOTES
Reason for Visit  Christiano Garcia is a 55 year old year old male who is being seen for Return bronchiectas non CF    Assessment and plan:   Christiano Garcia is a 55-year-old with recurrent hemoptysis of unclear etiology.  He was referred to based on evaluation of his daughter who has bronchiectasis is a CFTR mutation carrier hands extensive genetic testing is suggestive of a connective tissue disorder although a specific diagnosis has not been made.  I did speak to the patient's daughters pulmonologist (Ben Gomez) at the patient's request and he confirmed that there is concern for connective tissue disorder but no specific diagnosis is available.  The patient is also had genetic testing and he reports similar abnormalities although the results have not been available for my review.  Does have previous chest CT with bilateral lower lobe partial  consolidation concerning for lipoid pneumonia, aspiration of fatty contents or infection in combination with enlarged intrafissural lymphadenopathy.  Patient has a history of intermittent hemoptysis which has recurred twice in the past 2 weeks.  Etiology of the patient's symptom complex is unclear.  Repeat chest CT and bronchoscopy with possible airway or parenchymal biopsies is recommended.  Unfortunately, the patient has severe PTSD and would require general anesthesia for CT scan or bronchoscopy.  We will work on scheduling CT followed by bronchoscopy by IP with CT results determining whether airway or transbronchial biopsies will be pursued.  I will review this plan with IP colleagues.  We will contact the patient's with arrangements for procedures under general anesthesia.    Follow-up to be determined based on CT and bronchoscopy findings.    Luis Franklin MD  Contact via [employer]    Note: Chart documentation done in part with Dragon Voice Recognition software. Although reviewed after completion, some word and grammatical errors may remain. Please consider this when  interpreting information in this chart.       Pulmonary HPI    The patient was seen and examined by Luis Franklin MD     The patient reports 2 episodes of hemoptysis the past couple of weeks.  The first episode was a small amount of liquid blood.  The second episode he describes as blood mixed with sputum, about 2 ounces, maroon, thick and sticky.  Otherwise no sputum production.  Apart from these 2 episodes of hemoptysis, no cough.  No sputum.  Breathing is comfortable at rest.  He reports running 10 miles per day at 14-17 minutes per mile.  He does note some intermittent chest tightness with his exercise.  He does report a history of MI and previous angioplasty but does not remember where it was performed.  No recent fever, chills.  He does note intermittent night sweats.  He attributes this to too many blankets.    Review of systems:  Appetite is good.  He practices intermittent fasting, only eating from 4-7 p.m. in effort to avoid weight gain.  No ear pain, sore throat, sinus  Tachycardia with exercise  No nausea, vomiting, diarrhea or abdominal pain.  1 episode of blood in the stool.  He reports colonoscopy a few months ago results not available.  Back pain with left leg and foot numbness, recent MRI under anesthesia with results as noted in the radiology report.  The remainder of complete review of systems is negative except as noted.  Present illness.      Current Outpatient Medications   Medication Sig Dispense Refill    acetaminophen (TYLENOL) 500 MG tablet Take 1,000 mg by mouth 3 times daily as needed for mild pain.      AFRIN ORIGINAL 0.05 % nasal spray Spray 1 spray in nostril 2 times daily.      albuterol (PROAIR HFA/PROVENTIL HFA/VENTOLIN HFA) 108 (90 Base) MCG/ACT inhaler Inhale 2 puffs into the lungs every 4 hours as needed.      ALPRAZolam (XANAX) 0.5 MG tablet Take 0.5 mg by mouth 4 times daily as needed for anxiety.      ANORO ELLIPTA 62.5-25 MCG/ACT oral inhaler       atorvastatin  (LIPITOR) 80 MG tablet Take 80 mg by mouth daily.      benzocaine (ANBESOL) 10 % gel Take by mouth 4 times daily as needed for mouth sores.      DEEP SEA NASAL SPRAY 0.65 % nasal spray Spray 1 spray in nostril daily as needed for congestion.      DULoxetine (CYMBALTA) 60 MG capsule Take 60 mg by mouth daily.      gabapentin (NEURONTIN) 300 MG capsule Take 600 mg by mouth 4 times daily.      ibuprofen (ADVIL/MOTRIN) 600 MG tablet Take 600 mg by mouth every 6 hours as needed for moderate pain.      KETAMINE HCL IV Inject into the vein once a week. Pt reports twice a week      LANsoprazole (PREVACID) 30 MG DR capsule Take 60 mg by mouth 2 times daily.      NICOTROL 10 MG inhaler       ondansetron (ZOFRAN) 4 MG tablet Take 4 mg by mouth every 6 hours as needed for vomiting or nausea.      zolpidem (AMBIEN) 10 MG tablet       buPROPion (WELLBUTRIN SR) 150 MG 12 hr tablet 150 mg 2 times daily. (Patient not taking: Reported on 5/1/2025)      lithium (ESKALITH CR/LITHOBID) 450 MG CR tablet  (Patient not taking: Reported on 5/1/2025)      nicotine (NICODERM CQ) 21 MG/24HR 24 hr patch  (Patient not taking: Reported on 5/1/2025)      oxyCODONE-acetaminophen (PERCOCET) 5-325 MG tablet Take 1-2 tablets by mouth every 6 hours as needed for pain or moderate to severe pain. (Patient not taking: Reported on 5/1/2025) 6 tablet 0    QUEtiapine (SEROQUEL) 100 MG tablet Take 100 mg by mouth at bedtime. 100mg TID PRN, 500mg at bedtime PRN (Patient not taking: Reported on 5/1/2025)       No current facility-administered medications for this visit.     Allergies   Allergen Reactions    Cats Itching     Past Medical History:   Diagnosis Date    Anxiety and depression     Bipolar disease, chronic (H)     CAD (coronary artery disease)     s/p MI    Chest pain     Encounter for screening for endocrine disorder     Esophagitis     Feeding disorder associated with insult to gastrointestinal tract     GERD (gastroesophageal reflux disease)      h/o Acute intermittent porphyria     per patient, doubt raised by GI during 5/2013 admission    Headache     Hemoptysis     History of colonic polyps     Hypercholesteremia     Insomnia     Jaw mass     Kidney stones     Lingular pneumonia     Pain of maxillary sinus     PONV (postoperative nausea and vomiting)     PUD (peptic ulcer disease)     Stress at home     Tobacco use disorder, severe, dependence     Tooth pain        Past Surgical History:   Procedure Laterality Date    ANESTHESIA OUT OF OR MRI N/A 4/3/2025    Procedure: MRI of Cervical Spine without contrast and MRI Lumbar Spine without contrast;  Surgeon: GENERIC ANESTHESIA PROVIDER;  Location:  OR    BRONCHOSCOPY (RIGID OR FLEXIBLE), DIAGNOSTIC N/A 1/26/2023    Procedure: BRONCHOALVEOLAR LAVAGE;  Surgeon: Rochelle Burks MD;  Location: [facility] OR    BRONCHOSCOPY FLEXIBLE AND RIGID N/A 1/26/2023    Procedure: BRONCHOSCOPY,  Airway Inspection;  Surgeon: Rochelle Burks MD;  Location:  OR    CV CORONARY ANGIOGRAM N/A 7/28/2023    Procedure: Coronary Angiogram;  Surgeon: Heriberto Flowers MD;  Location:  [facility]ARD[facility]ATH [facility]    ENDOSCOPIC BALLOON SINUPLASTY, OPTICAL TRACKING SYSTEM ENDOSCOPIC SINUS SURGERY, COMBINED Right 9/3/2024    Procedure: FUNCTIONAL ENDOSCOPIC Right maxillary and Right Frontal SINUS SURGERY, WITH BALLOON SINUPLASTY.;  Surgeon: Jesús Amezcua MD;  Location: [facility] OR    ESOPHAGOSCOPY, GASTROSCOPY, DUODENOSCOPY (EGD), COMBINED  2/21/2013    Procedure: COMBINED ESOPHAGOSCOPY, GASTROSCOPY, DUODENOSCOPY (EGD), BIOPSY SINGLE OR MULTIPLE;  EGD with cold biopsy for h pylori;  Surgeon: Jen Galo MD;  Location:  GI    ESOPHAGOSCOPY, GASTROSCOPY, DUODENOSCOPY (EGD), COMBINED  3/8/2013    Procedure: COMBINED ESOPHAGOSCOPY, GASTROSCOPY, DUODENOSCOPY (EGD);  Esophagoscopy,Gastroscopy,Duodenoscopy- Room 504;  Surgeon: Jimenez King DO;  Location:  GI    IR LUMBAR PUNCTURE  4/24/2012    IR LUMBAR PUNCTURE  3/22/2012  "   LAPAROTOMY EXPLORATORY         Social History     Socioeconomic History    Marital status: Single     Spouse name: Not on file    Number of children: Not on file    Years of education: Not on file    Highest education level: Not on file   Occupational History    Not on file   Tobacco Use    Smoking status: Former     Current packs/day: 0.00     Average packs/day: 2.0 packs/day for 40.6 years (81.2 ttl pk-yrs)     Types: Cigarettes     Start date:      Quit date: 2024     Years since quittin.7    Smokeless tobacco: Never   Substance and Sexual Activity    Alcohol use: No    Drug use: No    Sexual activity: Not Currently   Other Topics Concern    Parent/sibling w/ CABG, MI or angioplasty before 65F 55M? Not Asked   Social History Narrative    Christiano has lived his life in [place], served in the [employer] and with [employer].     Lives in assisted living center.   with 2 daughters.     Social Drivers of Health     Financial Resource Strain: Not on file   Food Insecurity: Not on file   Transportation Needs: Not on file   Physical Activity: Not on file   Stress: Not on file   Social Connections: Not on file   Interpersonal Safety: Low Risk  (4/3/2025)    Interpersonal Safety     Do you feel physically and emotionally safe where you currently live?: Yes     Within the past 12 months, have you been hit, slapped, kicked or otherwise physically hurt by someone?: No     Within the past 12 months, have you been humiliated or emotionally abused in other ways by your partner or ex-partner?: No   Housing Stability: Not on file           /78 (BP Location: Right arm, Patient Position: Sitting, Cuff Size: Adult Regular)   Pulse 100   Ht 1.874 m (6' 1.78\")   Wt 107.1 kg (236 lb 3.2 oz)   SpO2 96%   BMI 30.51 kg/m    Exam:   GENERAL APPEARANCE: Well developed, well nourished, alert, and in no apparent distress.  EYES: PERRL, EOMI  HENT: Nasal mucosa with edema and no hyperemia. No nasal " polyps.  EARS: Canals clear, TMs normal  MOUTH: Oral mucosa is moist, without any lesions, no tonsillar enlargement, no oropharyngeal exudate.  NECK: supple, no masses, no thyromegaly.  LYMPHATICS: No significant axillary, cervical, or supraclavicular nodes.  RESP: normal percussion, good air flow throughout.  Bibasilar egophony. No crackles. No rhonchi. No wheezes.  Egophony, bilateral bases.  CV: Normal S1, S2, regular rhythm, normal rate. No murmur.  No rub. No gallop. No LE edema.   ABDOMEN:  Bowel sounds normal, soft, nontender, no HSM or masses.   MS: extremities normal. No clubbing. No cyanosis.  SKIN: no rash on limited exam  NEURO: Mentation intact, speech normal, normal strength and tone, normal gait and stance  PSYCH: mentation appears normal. and affect normal/bright

## 2025-05-01 NOTE — PATIENT INSTRUCTIONS
We will schedule chest CT and bronchoscopy under general anaesthesia.  My team will call you in the next few datys with a scheduling plan.      [facility] Nurse line:  Yadira Quinteros [phone]     [facility] Fax Number:     [phone]        [facility] Respiratory Therapists:   Gina Ly              [phone]          Alem Mariscal  [phone]

## 2025-05-01 NOTE — NURSING NOTE
Chief Complaint   Patient presents with    Return bronchiectas non CF     Medications reviewed and vital signs taken.   Yuan Terrell, EMT

## 2025-05-01 NOTE — LETTER
5/1/2025      Christiano Garcia  [address] [place] [address]      Dear Colleague,    Thank you for referring your patient, Christiano Garcia, to the [facility] [place]. Please see a copy of my visit note below.    Reason for Visit  Christiano Garcia is a 55 year old year old male who is being seen for Return bronchiectas non CF    Assessment and plan:   Christiano Garcia is a 55-year-old with recurrent hemoptysis of unclear etiology.  He was referred to based on evaluation of his daughter who has bronchiectasis is a CFTR mutation carrier hands extensive genetic testing is suggestive of a connective tissue disorder although a specific diagnosis has not been made.  I did speak to the patient's daughters pulmonologist (Ben Gomez) at the patient's request and he confirmed that there is concern for connective tissue disorder but no specific diagnosis is available.  The patient is also had genetic testing and he reports similar abnormalities although the results have not been available for my review.  Does have previous chest CT with bilateral lower lobe partial  consolidation concerning for lipoid pneumonia, aspiration of fatty contents or infection in combination with enlarged intrafissural lymphadenopathy.  Patient has a history of intermittent hemoptysis which has recurred twice in the past 2 weeks.  Etiology of the patient's symptom complex is unclear.  Repeat chest CT and bronchoscopy with possible airway or parenchymal biopsies is recommended.  Unfortunately, the patient has severe PTSD and would require general anesthesia for CT scan or bronchoscopy.  We will work on scheduling CT followed by bronchoscopy by IP with CT results determining whether airway or transbronchial biopsies will be pursued.  I will review this plan with IP colleagues.  We will contact the patient's with arrangements for procedures under general anesthesia.    Follow-up to be determined based on CT and  bronchoscopy findings.    Luis Franklin MD  Contact via [employer]    Note: Chart documentation done in part with Dragon Voice Recognition software. Although reviewed after completion, some word and grammatical errors may remain. Please consider this when interpreting information in this chart.       Pulmonary HPI    The patient was seen and examined by Luis Franklin MD     The patient reports 2 episodes of hemoptysis the past couple of weeks.  The first episode was a small amount of liquid blood.  The second episode he describes as blood mixed with sputum, about 2 ounces, maroon, thick and sticky.  Otherwise no sputum production.  Apart from these 2 episodes of hemoptysis, no cough.  No sputum.  Breathing is comfortable at rest.  He reports running 10 miles per day at 14-17 minutes per mile.  He does note some intermittent chest tightness with his exercise.  He does report a history of MI and previous angioplasty but does not remember where it was performed.  No recent fever, chills.  He does note intermittent night sweats.  He attributes this to too many blankets.    Review of systems:  Appetite is good.  He practices intermittent fasting, only eating from 4-7 p.m. in effort to avoid weight gain.  No ear pain, sore throat, sinus  Tachycardia with exercise  No nausea, vomiting, diarrhea or abdominal pain.  1 episode of blood in the stool.  He reports colonoscopy a few months ago results not available.  Back pain with left leg and foot numbness, recent MRI under anesthesia with results as noted in the radiology report.  The remainder of complete review of systems is negative except as noted.  Present illness.      Current Outpatient Medications   Medication Sig Dispense Refill     acetaminophen (TYLENOL) 500 MG tablet Take 1,000 mg by mouth 3 times daily as needed for mild pain.       AFRIN ORIGINAL 0.05 % nasal spray Spray 1 spray in nostril 2 times daily.       albuterol (PROAIR HFA/PROVENTIL HFA/VENTOLIN  HFA) 108 (90 Base) MCG/ACT inhaler Inhale 2 puffs into the lungs every 4 hours as needed.       ALPRAZolam (XANAX) 0.5 MG tablet Take 0.5 mg by mouth 4 times daily as needed for anxiety.       ANORO ELLIPTA 62.5-25 MCG/ACT oral inhaler        atorvastatin (LIPITOR) 80 MG tablet Take 80 mg by mouth daily.       benzocaine (ANBESOL) 10 % gel Take by mouth 4 times daily as needed for mouth sores.       DEEP SEA NASAL SPRAY 0.65 % nasal spray Spray 1 spray in nostril daily as needed for congestion.       DULoxetine (CYMBALTA) 60 MG capsule Take 60 mg by mouth daily.       gabapentin (NEURONTIN) 300 MG capsule Take 600 mg by mouth 4 times daily.       ibuprofen (ADVIL/MOTRIN) 600 MG tablet Take 600 mg by mouth every 6 hours as needed for moderate pain.       KETAMINE HCL IV Inject into the vein once a week. Pt reports twice a week       LANsoprazole (PREVACID) 30 MG DR capsule Take 60 mg by mouth 2 times daily.       NICOTROL 10 MG inhaler        ondansetron (ZOFRAN) 4 MG tablet Take 4 mg by mouth every 6 hours as needed for vomiting or nausea.       zolpidem (AMBIEN) 10 MG tablet        buPROPion (WELLBUTRIN SR) 150 MG 12 hr tablet 150 mg 2 times daily. (Patient not taking: Reported on 5/1/2025)       lithium (ESKALITH CR/LITHOBID) 450 MG CR tablet  (Patient not taking: Reported on 5/1/2025)       nicotine (NICODERM CQ) 21 MG/24HR 24 hr patch  (Patient not taking: Reported on 5/1/2025)       oxyCODONE-acetaminophen (PERCOCET) 5-325 MG tablet Take 1-2 tablets by mouth every 6 hours as needed for pain or moderate to severe pain. (Patient not taking: Reported on 5/1/2025) 6 tablet 0     QUEtiapine (SEROQUEL) 100 MG tablet Take 100 mg by mouth at bedtime. 100mg TID PRN, 500mg at bedtime PRN (Patient not taking: Reported on 5/1/2025)       No current facility-administered medications for this visit.     Allergies   Allergen Reactions     Cats Itching     Past Medical History:   Diagnosis Date     Anxiety and depression       Bipolar disease, chronic (H)      CAD (coronary artery disease)     s/p MI     Chest pain      Encounter for screening for endocrine disorder      Esophagitis      Feeding disorder associated with insult to gastrointestinal tract      GERD (gastroesophageal reflux disease)      h/o Acute intermittent porphyria     per patient, doubt raised by GI during 5/2013 admission     Headache      Hemoptysis      History of colonic polyps      Hypercholesteremia      Insomnia      Jaw mass      Kidney stones      Lingular pneumonia      Pain of maxillary sinus      PONV (postoperative nausea and vomiting)      PUD (peptic ulcer disease)      Stress at home      Tobacco use disorder, severe, dependence      Tooth pain        Past Surgical History:   Procedure Laterality Date     ANESTHESIA OUT OF OR MRI N/A 4/3/2025    Procedure: MRI of Cervical Spine without contrast and MRI Lumbar Spine without contrast;  Surgeon: GENERIC ANESTHESIA PROVIDER;  Location:  OR     BRONCHOSCOPY (RIGID OR FLEXIBLE), DIAGNOSTIC N/A 1/26/2023    Procedure: BRONCHOALVEOLAR LAVAGE;  Surgeon: Rochelle Burks MD;  Location:  OR     BRONCHOSCOPY FLEXIBLE AND RIGID N/A 1/26/2023    Procedure: BRONCHOSCOPY,  Airway Inspection;  Surgeon: Rochelle Burks MD;  [facility]     CV CORONARY ANGIOGRAM N/A 7/28/2023    Procedure: Coronary Angiogram;  Surgeon: Heriberto Flowers MD;  Location:  HEART CARDIAC CATH LAB     ENDOSCOPIC BALLOON SINUPLASTY, OPTICAL TRACKING SYSTEM ENDOSCOPIC SINUS SURGERY, COMBINED Right 9/3/2024    Procedure: FUNCTIONAL ENDOSCOPIC Right maxillary and Right Frontal SINUS SURGERY, WITH BALLOON SINUPLASTY.;  Surgeon: Jesús Amezcua MD;  Location: [facility] OR     ESOPHAGOSCOPY, GASTROSCOPY, DUODENOSCOPY (EGD), COMBINED  2/21/2013    Procedure: COMBINED ESOPHAGOSCOPY, GASTROSCOPY, DUODENOSCOPY (EGD), BIOPSY SINGLE OR MULTIPLE;  EGD with cold biopsy for h pylori;  Surgeon: Jen Galo MD;  Location:  GI      ESOPHAGOSCOPY, GASTROSCOPY, DUODENOSCOPY (EGD), COMBINED  3/8/2013    Procedure: COMBINED ESOPHAGOSCOPY, GASTROSCOPY, DUODENOSCOPY (EGD);  Esophagoscopy,Gastroscopy,Duodenoscopy- Room 504;  Surgeon: Jimenez King DO;  Location: [facility] GI     IR LUMBAR PUNCTURE  2012     IR LUMBAR PUNCTURE  3/22/2012     LAPAROTOMY EXPLORATORY  1995       Social History     Socioeconomic History     Marital status: Single     Spouse name: Not on file     Number of children: Not on file     Years of education: Not on file     Highest education level: Not on file   Occupational History     Not on file   Tobacco Use     Smoking status: Former     Current packs/day: 0.00     Average packs/day: 2.0 packs/day for 40.6 years (81.2 ttl pk-yrs)     Types: Cigarettes     Start date:      Quit date: 2024     Years since quittin.7     Smokeless tobacco: Never   Substance and Sexual Activity     Alcohol use: No     Drug use: No     Sexual activity: Not Currently   Other Topics Concern     Parent/sibling w/ CABG, MI or angioplasty before 65F 55M? Not Asked   Social History Narrative    Christiano has lived his life in [place], served in the [employer] and with [employer].     Lives in assisted living center.   with 2 daughters.     Social Drivers of Health     Financial Resource Strain: Not on file   Food Insecurity: Not on file   Transportation Needs: Not on file   Physical Activity: Not on file   Stress: Not on file   Social Connections: Not on file   Interpersonal Safety: Low Risk  (4/3/2025)    Interpersonal Safety      Do you feel physically and emotionally safe where you currently live?: Yes      Within the past 12 months, have you been hit, slapped, kicked or otherwise physically hurt by someone?: No      Within the past 12 months, have you been humiliated or emotionally abused in other ways by your partner or ex-partner?: No   Housing Stability: Not on file           /78 (BP Location: Right arm, Patient  "Position: Sitting, Cuff Size: Adult Regular)   Pulse 100   Ht 1.874 m (6' 1.78\")   Wt 107.1 kg (236 lb 3.2 oz)   SpO2 96%   BMI 30.51 kg/m    Exam:   GENERAL APPEARANCE: Well developed, well nourished, alert, and in no apparent distress.  EYES: PERRL, EOMI  HENT: Nasal mucosa with edema and no hyperemia. No nasal polyps.  EARS: Canals clear, TMs normal  MOUTH: Oral mucosa is moist, without any lesions, no tonsillar enlargement, no oropharyngeal exudate.  NECK: supple, no masses, no thyromegaly.  LYMPHATICS: No significant axillary, cervical, or supraclavicular nodes.  RESP: normal percussion, good air flow throughout.  Bibasilar egophony. No crackles. No rhonchi. No wheezes.  Egophony, bilateral bases.  CV: Normal S1, S2, regular rhythm, normal rate. No murmur.  No rub. No gallop. No LE edema.   ABDOMEN:  Bowel sounds normal, soft, nontender, no HSM or masses.   MS: extremities normal. No clubbing. No cyanosis.  SKIN: no rash on limited exam  NEURO: Mentation intact, speech normal, normal strength and tone, normal gait and stance  PSYCH: mentation appears normal. and affect normal/bright                    Again, thank you for allowing me to participate in the care of your patient.        Sincerely,        Luis Franklin MD    Electronically signed"

## 2025-05-12 DIAGNOSIS — R04.2 HEMOPTYSIS: Primary | ICD-10-CM

## 2025-05-12 NOTE — PROGRESS NOTES
Requested by Dr Franklin, patient needs general anesthesia for a CT to evaluate hemoptysis. Plan for bronchoscopy immediately after with diagnostic/therapeutic procedures as indicated by CT findings      So we can arrange for bronchoscopy with possible BAL, endobronchial biopsy, transbronchial lung biopsy, endobronchial ultrasound transbronchial needle aspiration and tissue debulking or cautery     patient gets a CT angio prior to the bronchoscopy while under general.

## 2025-05-13 ENCOUNTER — TELEPHONE (OUTPATIENT)
Dept: PULMONOLOGY | Facility: CLINIC | Age: 55
End: 2025-05-13
Payer: COMMERCIAL

## 2025-05-13 ENCOUNTER — DOCUMENTATION ONLY (OUTPATIENT)
Dept: PULMONOLOGY | Facility: CLINIC | Age: 55
End: 2025-05-13
Payer: COMMERCIAL

## 2025-05-13 NOTE — TELEPHONE ENCOUNTER
Patient called to schedule IP procedure. Scheduled 05/19 with Dr. Terry. Virtual PAC visit scheduled 05/14. Packet information sent via [employer] per patient request.    Andrew East on 5/13/2025 at 9:10 AM

## 2025-05-13 NOTE — TELEPHONE ENCOUNTER
FUTURE VISIT INFORMATION      SURGERY INFORMATION:  Date: 2025   Location:  [facility] OR   Surgeon:  Amy Terry MD   Anesthesia Type:  General   Procedure: Bronchoscopy flexible POSSIBLE BRONCHOSCOPY, FLEXIBLE, WITH TRANSBRONCHIAL BIOPSY USING CRYOPROBE; POSSIBLE BRONCHOSCOPY, WITH BIOPSY OF 1 OR 2 LYMPH NODE STATIONS WITH ENDOBRONCHIAL ULTRASOUND GUIDANCE   Consult: 25    RECORDS REQUESTED FROM:       Primary Care Provider: PCP - [facility]    Pertinent Medical History: Hemoptysis; Bipolar disease, chronic (H); CAD; Chest pain; Encounter for screening for endocrine disorder; Feeding disorder associated with insult to gastrointestinal tract; GERD; Hemoptysis; Hypercholesteremia; Lingular pneumonia; PONV; PUD; Tobacco use disorder, severe, dependence      Most recent EKG+ Tracin24 - [facility]    Most recent ECHO: 18    Most recent Cardiac Stress Test: 18    Most recent Coronary Angiogram: 23    Most recent PFT's: 25    Action    Action Taken EKG req sent to [facility]

## 2025-05-13 NOTE — NURSING NOTE
Interventional Pulmonology: Pre-Flight Planning    Procedure date: May 19th, 2025    Scheduled procedure: BRONCHOSCOPY, WITH BIOPSY OF 3 OR MORE LYMPH NODE STATIONS WITH ENDOBRONCHIAL ULTRASOUND GUIDANCE POSSIBLE FLEXIBLE BRONCHOSCOPY WITH TRANSBRONCHIAL BIOPSY USING CRYOPROBE.    Location: [facility]    Anesthesia: General.    H&P plan: Will be completed on 5/14 by Mimi Lindsey PA-C.    Medication hold(s): No anticoagulation.    CT scheduled: CT Angio scheduled - needs to be done under general anesthesia d/t severe PTSD. Patient will need to be intubated/under GA then brought to CT > then to OR for procedure. Dr. Terry and Dr. Carey aware of plan/need for this.    Preoperative Instructions: Sent to patient via [employer] (confirmed read).    Pathology results follow up: Dr. Franklin

## 2025-05-14 ENCOUNTER — PRE VISIT (OUTPATIENT)
Dept: SURGERY | Facility: CLINIC | Age: 55
End: 2025-05-14

## 2025-05-14 ENCOUNTER — VIRTUAL VISIT (OUTPATIENT)
Dept: SURGERY | Facility: CLINIC | Age: 55
End: 2025-05-14
Payer: COMMERCIAL

## 2025-05-14 ENCOUNTER — ANESTHESIA EVENT (OUTPATIENT)
Dept: SURGERY | Facility: CLINIC | Age: 55
End: 2025-05-14
Payer: COMMERCIAL

## 2025-05-14 VITALS — BODY MASS INDEX: 30.21 KG/M2 | HEIGHT: 75 IN | WEIGHT: 243 LBS

## 2025-05-14 DIAGNOSIS — Z01.818 PRE-OP EVALUATION: Primary | ICD-10-CM

## 2025-05-14 ASSESSMENT — PAIN SCALES - GENERAL: PAINLEVEL_OUTOF10: MILD PAIN (3)

## 2025-05-14 ASSESSMENT — LIFESTYLE VARIABLES: TOBACCO_USE: 1

## 2025-05-14 ASSESSMENT — ENCOUNTER SYMPTOMS: SEIZURES: 0

## 2025-05-14 NOTE — PATIENT INSTRUCTIONS
Preparing for Your Surgery      Name:  Christiano Garcia   MRN:  [id number]   :  1970   Today's Date:  2025     The [place] [employer] [address] Construction Project                                Timeline 2025 -2025    This project will affect travel to the [facility] and [facility], as well as the [facility].      Please check the [facility] [address] project website for the most up to date information and give yourself additional time to reach your destination.        Arriving for surgery:  Surgery date:  25  Arrival time:  6:15 am  Surgery time: 8:15 am    Please come to:     Please come to:       [facility]    [address] [place]  [address]     The [facility] ([facility] [place] Patient/Visitor Ramp is at [address]. Patients and visitors who self-park will receive the reduced hospital parking rate. If the Patient /Visitor Ramp is full, please follow the signs to the [employer] car park located at the [facility] entrance.      [place] parking is available (24 hours/ 7 days a week)      Discounted parking pass options are available for patients and visitors. They can be purchased at the [employer] desk at the [facility] entrance.     -    Stop at the security desk and they will direct surgery patients to the Surgery Check in and Family Lounge. [phone]        - If you need directions, a wheelchair or an escort please stop at the Information/security desk in the lobby.     What can I eat or drink?  -  You may eat and drink normally up to 8 hours prior to arrival time. (Until 10:15 pm on 25)  -  You may have clear liquids until 2 hours prior to arrival time. (Until 4:15 am on 25)    Examples of clear liquids:  Water  Clear broth  Juices (apple, white grape, white cranberry  and cider) without pulp  Noncarbonated,  powder based beverages  (lemonade and Juan Carlos-Aid)  Sodas (Sprite, 7-Up, ginger ale and seltzer)  Coffee or tea (without milk or cream)  Gatorade    -  No Alcohol or cannabis products for at least 24 hours before surgery.     Which medicines can I take?    Hold Aspirin for 7 days before surgery.   Hold Multivitamins for 7 days before surgery.  Hold Supplements for 7 days before surgery.  Hold Ibuprofen (Advil, Motrin) for 1 day(s) before surgery--unless otherwise directed by surgeon.  Hold Naproxen (Aleve) for 4 days before surgery.    -  PLEASE TAKE these medications the day of surgery or per your usual routine:  Tylenol if needed  Albuterol inhaler if needed and bring this with you day of procedure  Xanax if needed  Anoro ellipta inhaler  Duloxetine (Cymbalta)  Gabapentin (Neurontin)  Lansoprazole (Prevacid)  Ondansetron (Zofran) if needed  Quetiapine (Seroquil) if needed  Ambien if needed the night before procedure    How do I prepare myself?  - Please take 2 showers (one the night prior to surgery and one the morning of surgery) using Scrubcare or Hibiclens soap.    Use this soap only from the neck to your toes. Avoid genital area      Leave the soap on your skin for one minute--then rinse thoroughly.      You may use your own shampoo and conditioner. No other hair products.   - Please remove all jewelry and body piercings.  - No lotions, deodorants or fragrance.  - No makeup or fingernail polish.   - Bring your ID and insurance card.    -For patients being admitted to the [facility]  Family members are to take the patient belongings with them and place them in the lockers provided in the Family Lounge.  Please limit the items you bring to 1 bag as the lockers are small.      -If you use a CPAP machine, please bring the CPAP machine, tubing, and mask to hospital.    -If you have a Deep Brain Stimulator, Spinal Cord Stimulator, or any Neuro Stimulator device---you must bring the remote control to the  hospital.      ALL PATIENTS GOING HOME THE SAME DAY OF SURGERY ARE REQUIRED TO HAVE A RESPONSIBLE ADULT TO DRIVE AND BE IN ATTENDANCE WITH THEM FOR 24 HOURS FOLLOWING SURGERY.    Covid testing policy as of 12/06/2022  Your surgeon will notify and schedule you for a COVID test if one is needed before surgery--please direct any questions or COVID symptoms to your surgeon      Questions or Concerns:    - For any questions regarding the day of surgery or your hospital stay, please contact the Pre Admission Nursing Office at [phone].       - If you have health changes between today and your surgery, please call your surgeon.       - For questions after surgery, please call your surgeons office.           Current Visitor Guidelines    2 adult visitors for adult patients in the pre op area    If additional visitors come (beyond a patient care attendant or a group home caregiver), the additional visitors will be asked to wait in the main lobby of the hospital    Visiting hours: 8 a.m. to 8:30 p.m.    Patients confirmed or suspected to have symptoms of COVID 19 or flu:     No visitors allowed for adult patients.   Children (under age 18) can have 1 named visitor.     People who are sick or showing symptoms of COVID 19 or flu:    Are not allowed to visit patients--we can only make exceptions in special situations.       Please follow these guidelines for your visit:          Please maintain social distance          Masking is optional--however at times you may be asked to wear a mask for the safety of yourself and others     Clean your hands with alcohol hand . Do this when you arrive at and leave the building and patient room,    And again after you touch your mask or anything in the room.     Go directly to and from the room you are visiting.     Stay in the patient s room during your visit. Limit going to other places in the hospital as much as possible     Leave bags and jackets at home or in the car.      For everyone s health, please don t come and go during your visit. That includes for smoking   during your visit.

## 2025-05-14 NOTE — H&P
Pre-Operative H & P     CC:  Preoperative exam to assess for increased cardiopulmonary risk while undergoing surgery and anesthesia.    Date of Encounter: 5/14/2025  Primary Care Physician:  [facility]     Reason for visit:   Encounter Diagnosis   Name Primary?    Pre-op evaluation Yes       HPI  Christiano Garcia is a 55 year old male who presents for pre-operative H & P in preparation for  Procedure Information       Case: [id number] Date/Time: 05/19/25 0815    Procedures:       Bronchoscopy flexible (Bronchus)      POSSIBLE BRONCHOSCOPY, FLEXIBLE, WITH TRANSBRONCHIAL BIOPSY USING CRYOPROBE (Bronchus)      POSSIBLE BRONCHOSCOPY, WITH BIOPSY OF 1 OR 2 LYMPH NODE STATIONS WITH ENDOBRONCHIAL ULTRASOUND GUIDANCE (Bronchus)    Anesthesia type: General    Diagnosis: Hemoptysis [R04.2]    Pre-op diagnosis: Hemoptysis [R04.2]    Location:  OR [address] [place] [address] OR    Providers: Amy Terry MD            Patient is being evaluated for comorbid conditions of CAD, dyslipidemia, GERD, tobacco use, PONV, bipolar disease, anxiety, depression    Mr. Garcia was recently evaluated by Dr. Franklin for recurrent hemoptysis of unclear etiology. Imaging revealed bilateral lower lobe partial consolidation concerning for lipoid pneumonia, aspiration of fatty contents or infection in combination with enlarged intrafissural lymphadenopathy. Recommendation was made for repeat CT and bronchoscopy with possible airway or parenchymal biopsies. Due to severe PTSD, patient requires general anesthesia for CT scans.     History is obtained from the patient and chart review    Hx of abnormal bleeding or anti-platelet use: denies      Past Medical History  Past Medical History:   Diagnosis Date    Anxiety and depression     Bipolar disease, chronic (H)     CAD (coronary artery disease)     s/p MI    Chest pain     Encounter for screening for endocrine disorder     Esophagitis     Feeding disorder associated with insult to gastrointestinal  tract     GERD (gastroesophageal reflux disease)     h/o Acute intermittent porphyria     per patient, doubt raised by GI during 5/2013 admission    Headache     Hemoptysis     History of colonic polyps     Hypercholesteremia     Insomnia     Jaw mass     Kidney stones     Lingular pneumonia     Pain of maxillary sinus     PONV (postoperative nausea and vomiting)     PUD (peptic ulcer disease)     Stress at home     Tobacco use disorder, severe, dependence     Tooth pain        Past Surgical History  Past Surgical History:   Procedure Laterality Date    ANESTHESIA OUT OF OR MRI N/A 4/3/2025    Procedure: MRI of Cervical Spine without contrast and MRI Lumbar Spine without contrast;  Surgeon: GENERIC ANESTHESIA PROVIDER;  Location:  OR    BRONCHOSCOPY (RIGID OR FLEXIBLE), DIAGNOSTIC N/A 1/26/2023    Procedure: BRONCHOALVEOLAR LAVAGE;  Surgeon: Rochelle Burks MD;  Location: [facility] OR    BRONCHOSCOPY FLEXIBLE AND RIGID N/A 1/26/2023    Procedure: BRONCHOSCOPY,  Airway Inspection;  Surgeon: Rochelle Burks MD;  Location: [facility] OR    CV CORONARY ANGIOGRAM N/A 7/28/2023    Procedure: Coronary Angiogram;  Surgeon: Heriberto Flowers MD;  Location:  HE[facility] CARDIAC CATH LAB    ENDOSCOPIC BALLOON SINUPLASTY, OPTICAL TRACKING SYSTEM ENDOSCOPIC SINUS SURGERY, COMBINED Right 9/3/2024    Procedure: FUNCTIONAL ENDOSCOPIC Right maxillary and Right Frontal SINUS SURGERY, WITH BALLOON SINUPLASTY.;  Surgeon: Jesús Amezcua MD;  Location: [facility] OR    ESOPHAGOSCOPY, GASTROSCOPY, DUODENOSCOPY (EGD), COMBINED  2/21/2013    Procedure: COMBINED ESOPHAGOSCOPY, GASTROSCOPY, DUODENOSCOPY (EGD), BIOPSY SINGLE OR MULTIPLE;  EGD with cold biopsy for h pylori;  Surgeon: Jen Galo MD;  Location:  GI    ESOPHAGOSCOPY, GASTROSCOPY, DUODENOSCOPY (EGD), COMBINED  3/8/2013    Procedure: COMBINED ESOPHAGOSCOPY, GASTROSCOPY, DUODENOSCOPY (EGD);  Esophagoscopy,Gastroscopy,Duodenoscopy- Room 504;  Surgeon: Jimenez King DO;   Location:  GI    IR LUMBAR PUNCTURE  4/24/2012    IR LUMBAR PUNCTURE  3/22/2012    LAPAROTOMY EXPLORATORY  1995       Prior to Admission Medications  Current Outpatient Medications   Medication Sig Dispense Refill    acetaminophen (TYLENOL) 500 MG tablet Take 1,000 mg by mouth 3 times daily as needed for mild pain.      albuterol (PROAIR HFA/PROVENTIL HFA/VENTOLIN HFA) 108 (90 Base) MCG/ACT inhaler Inhale 2 puffs into the lungs every 4 hours as needed.      ALPRAZolam (XANAX) 0.5 MG tablet Take 0.5 mg by mouth 4 times daily as needed for anxiety.      DULoxetine (CYMBALTA) 60 MG capsule Take 60 mg by mouth every morning.      gabapentin (NEURONTIN) 300 MG capsule Take 600 mg by mouth 4 times daily.      ibuprofen (ADVIL/MOTRIN) 600 MG tablet Take 600 mg by mouth every 6 hours as needed for moderate pain.      KETAMINE HCL IV Inject into the vein once a week.      LANsoprazole (PREVACID) 30 MG DR capsule Take 60 mg by mouth 2 times daily.      ondansetron (ZOFRAN) 4 MG tablet Take 4 mg by mouth every 6 hours as needed for vomiting or nausea.      zolpidem (AMBIEN) 10 MG tablet       ANORO ELLIPTA 62.5-25 MCG/ACT oral inhaler       buPROPion (WELLBUTRIN SR) 150 MG 12 hr tablet 150 mg 2 times daily. (Patient not taking: Reported on 5/1/2025)      lithium (ESKALITH CR/LITHOBID) 450 MG CR tablet  (Patient not taking: Reported on 5/1/2025)      QUEtiapine (SEROQUEL) 100 MG tablet Take 100 mg by mouth at bedtime. 100mg TID PRN, 500mg at bedtime PRN (Patient not taking: Reported on 5/1/2025)         Allergies  Allergies   Allergen Reactions    Cats Itching       Social History  Social History     Socioeconomic History    Marital status: Single     Spouse name: Not on file    Number of children: Not on file    Years of education: Not on file    Highest education level: Not on file   Occupational History    Not on file   Tobacco Use    Smoking status: Former     Current packs/day: 0.00     Average packs/day: 2.0 packs/day  for 40.6 years (81.2 ttl pk-yrs)     Types: Cigarettes     Start date:      Quit date: 2024     Years since quittin.7    Smokeless tobacco: Never    Tobacco comments:     Quit early January   Substance and Sexual Activity    Alcohol use: No    Drug use: No    Sexual activity: Not Currently   Other Topics Concern    Parent/sibling w/ CABG, MI or angioplasty before 65F 55M? Not Asked   Social History Narrative    Christiano has lived his life in [place], served in the [employer] and with [employer].     Lives in assisted living center.   with 2 daughters.     Social Drivers of Health     Financial Resource Strain: Not on file   Food Insecurity: Not on file   Transportation Needs: Not on file   Physical Activity: Not on file   Stress: Not on file   Social Connections: Not on file   Interpersonal Safety: Low Risk  (4/3/2025)    Interpersonal Safety     Do you feel physically and emotionally safe where you currently live?: Yes     Within the past 12 months, have you been hit, slapped, kicked or otherwise physically hurt by someone?: No     Within the past 12 months, have you been humiliated or emotionally abused in other ways by your partner or ex-partner?: No   Housing Stability: Not on file       Family History  Family History   Adopted: Yes   Problem Relation Age of Onset    Other - See Comments Daughter         Poorly defined lung disease    No Known Problems Daughter        Review of Systems  The complete review of systems is negative other than noted in the HPI or here.   Anesthesia Evaluation   Pt has had prior anesthetic.     History of anesthetic complications  - PONV.      ROS/MED HX  ENT/Pulmonary: Comment: Hemoptysis     (+)                tobacco use, Past use,                       Neurologic:  - neg neurologic ROS  (-) no seizures and no CVA   Cardiovascular:     (+) Dyslipidemia - -   -  - -                                 Previous cardiac testing   Echo: Date: Results:    Stress  Test:  Date: Results:    ECG Reviewed:  Date: 11/2024 Results:  Normal sinus rhythm   Left axis deviation   Abnormal ECG     Cath:  Date: 7/2023 Results:     1st Diag lesion is 20% stenosed.  Angiogram without significant CAD.   (-) taking anticoagulants/antiplatelets   METS/Exercise Tolerance: >4 METS Comment: 10 miles walk/run most day. Denies CP, some AMAYA with running long distances    Hematologic:  - neg hematologic  ROS  (-) history of blood clots and history of blood transfusion   Musculoskeletal:  - neg musculoskeletal ROS     GI/Hepatic:     (+) GERD, Asymptomatic on medication,                  Renal/Genitourinary: Comment: H/o stones      Endo:  - neg endo ROS  (-) chronic steroid usage   Psychiatric/Substance Use:     (+) psychiatric history bipolar, anxiety and depression       Infectious Disease:  - neg infectious disease ROS     Malignancy:       Other:            Virtual visit -  No vitals were obtained    Physical Exam  Constitutional: Awake, alert, cooperative, no apparent distress, and appears stated age.  HENT: Normocephalic  Respiratory: non labored breathing   Neurologic: Awake, alert, oriented to name, place and time.   Neuropsychiatric: Calm, cooperative. Normal affect.      Prior Labs/Diagnostic Studies   All labs and imaging personally reviewed     EKG/ stress test - if available please see in ROS above   No results found.       No data to display                  The patient's records and results personally reviewed by this provider.     Outside records reviewed from: Care Everywhere      Assessment    Christiano A Zoch is a 55 year old male seen as a PAC referral for risk assessment and optimization for anesthesia.    Plan/Recommendations  Pt will be optimized for the proposed procedure.  See below for details on the assessment, risk, and preoperative recommendations    NEUROLOGY  - No history of TIA, CVA or seizure  -Post Op delirium risk factors:  No risk identified    ENT  - No current airway  "concerns.  Will need to be reassessed day of surgery.  Mallampati: Unable to assess  TM: Unable to assess    CARDIAC  -cardiac cath 7/2023 showed 1st Diag lesion is 20% stenosed, no significant CAD  -denies cardiac symptoms   - METS (Metabolic Equivalents)  Patient performs 4 or more METS exercise without symptoms             Total Score: 0      RCRI-Very low risk: Class 1 0.4% complication rate             Total Score: 0        PULMONARY  -hemoptysis with the above procedure planned   ENEDELIA Low Risk             Total Score: 2    ENEDELIA: Over 50 ys old    ENEDELIA: Male      - Denies asthma or inhaler use  - Tobacco History    History   Smoking Status    Former    Types: Cigarettes   Smokeless Tobacco    Never       GI  - GERD  Controlled on medications: Proton Pump Inhibitor  PONV High Risk  Total Score: 3           1 AN PONV: Patient is not a current smoker    1 AN PONV: Patient has history of PONV    1 AN PONV: Intended Post Op Opioids        /RENAL  - Baseline Creatinine WNL    ENDOCRINE    - BMI: Estimated body mass index is 30.37 kg/m  as calculated from the following:    Height as of this encounter: 1.905 m (6' 3\").    Weight as of this encounter: 110.2 kg (243 lb).  Obesity (BMI >30)  - No history of Diabetes Mellitus    HEME  VTE Low Risk 0.5%             Total Score: 2    VTE: Male      - No history of abnormal bleeding or antiplatelet use.    MSK  Patient is NOT Frail             Total Score: 0      -PM&R referral not indicated     PSYCH  Anxiety, depression, bipolar disorder. Using xanax prn, wellbutrin, cymbalta, ketamine twice weekly, lithium, seroquel    Different anesthesia methods/types have been discussed with the patient, but they are aware that the final plan will be decided by the assigned anesthesia provider on the date of service.    The patient is optimized for their procedure. AVS with information on surgery time/arrival time, meds and NPO status given by nursing staff. No further diagnostic testing " indicated.    Please refer to the physical examination documented by the anesthesiologist in the anesthesia record on the day of surgery.    Video-Visit Details    Type of service:  Video Visit    Provider received verbal consent for a Video Visit from the patient? Yes     Originating Location (pt. Location): Home    Distant Location (provider location):  Off-site  Mode of Communication:  Video Conference via Sidecar  On the day of service:     Prep time: 8 minutes  Visit time: 16 minutes  Documentation time: 5 minutes  ------------------------------------------  Total time: 29 minutes      Mimi Lindsey PA-C  Preoperative Assessment Center  [facility]  Clinic and Surgery Center  Phone: [phone]  Fax: [phone]

## 2025-05-14 NOTE — PROGRESS NOTES
Christiano is a 55 year old who is being evaluated via a billable video visit.    How would you like to obtain your AVS? MyChart  If the video visit is dropped, the invitation should be resent by: Text to cell phone: [phone]      Subjective   Christiano is a 55 year old, presenting for the following health issues:  Pre-Op Exam      HPI            Physical Exam

## 2025-05-18 ASSESSMENT — LIFESTYLE VARIABLES: TOBACCO_USE: 1

## 2025-05-18 NOTE — ANESTHESIA PREPROCEDURE EVALUATION
Anesthesia Pre-Procedure Evaluation    Patient: Christiano Garcia   MRN: [id number] : 1970          Procedure : Procedure(s):  Bronchoscopy flexible  POSSIBLE BRONCHOSCOPY, FLEXIBLE, WITH TRANSBRONCHIAL BIOPSY USING CRYOPROBE  POSSIBLE BRONCHOSCOPY, WITH BIOPSY OF 1 OR 2 LYMPH NODE STATIONS WITH ENDOBRONCHIAL ULTRASOUND GUIDANCE         Past Medical History:   Diagnosis Date    Anxiety and depression     Bipolar disease, chronic (H)     CAD (coronary artery disease)     s/p MI    Chest pain     Encounter for screening for endocrine disorder     Esophagitis     Feeding disorder associated with insult to gastrointestinal tract     GERD (gastroesophageal reflux disease)     h/o Acute intermittent porphyria     per patient, doubt raised by GI during 2013 admission    Headache     Hemoptysis     History of colonic polyps     Hypercholesteremia     Insomnia     Jaw mass     Kidney stones     Lingular pneumonia     Pain of maxillary sinus     PONV (postoperative nausea and vomiting)     PUD (peptic ulcer disease)     Stress at home     Tobacco use disorder, severe, dependence     Tooth pain       Past Surgical History:   Procedure Laterality Date    ANESTHESIA OUT OF OR MRI N/A 4/3/2025    Procedure: MRI of Cervical Spine without contrast and MRI Lumbar Spine without contrast;  Surgeon: GENERIC ANESTHESIA PROVIDER;  Location: [facility] OR    BRONCHOSCOPY (RIGID OR FLEXIBLE), DIAGNOSTIC N/A 2023    Procedure: BRONCHOALVEOLAR LAVAGE;  Surgeon: Rochelle Burks MD;  Location: [facility] OR    BRONCHOSCOPY FLEXIBLE AND RIGID N/A 2023    Procedure: BRONCHOSCOPY,  Airway Inspection;  Surgeon: Rochelle Burks MD;  Location: [facility] OR    CV CORONARY ANGIOGRAM N/A 2023    Procedure: Coronary Angiogram;  Surgeon: Heriberto Flowers MD;  Location:  [facility]    ENDOSCOPIC BALLOON SINUPLASTY, OPTICAL TRACKING SYSTEM ENDOSCOPIC SINUS SURGERY, COMBINED Right 9/3/2024    Procedure: FUNCTIONAL ENDOSCOPIC Right maxillary and  Right Frontal SINUS SURGERY, WITH BALLOON SINUPLASTY.;  Surgeon: Jesús Amezcua MD;  [place] [facility] [place]    ESOPHAGOSCOPY, GASTROSCOPY, DUODENOSCOPY (EGD), COMBINED  2013    Procedure: COMBINED ESOPHAGOSCOPY, GASTROSCOPY, DUODENOSCOPY (EGD), BIOPSY SINGLE OR MULTIPLE;  EGD with cold biopsy for h pylori;  Surgeon: Jen Galo MD;  Location:  GI    ESOPHAGOSCOPY, GASTROSCOPY, DUODENOSCOPY (EGD), COMBINED  3/8/2013    Procedure: COMBINED ESOPHAGOSCOPY, GASTROSCOPY, DUODENOSCOPY (EGD);  Esophagoscopy,Gastroscopy,Duodenoscopy- Room 504;  Surgeon: Jimenez King DO;  Location:  GI    IR LUMBAR PUNCTURE  2012    IR LUMBAR PUNCTURE  3/22/2012    LAPAROTOMY EXPLORATORY        Allergies   Allergen Reactions    Cats Itching      Social History     Tobacco Use    Smoking status: Former     Current packs/day: 0.00     Average packs/day: 2.0 packs/day for 40.6 years (81.2 ttl pk-yrs)     Types: Cigarettes     Start date:      Quit date: 2024     Years since quittin.7    Smokeless tobacco: Never    Tobacco comments:     Quit early January   Substance Use Topics    Alcohol use: No      Wt Readings from Last 1 Encounters:   25 110.2 kg (243 lb)        Anesthesia Evaluation   Pt has had prior anesthetic. Type: General.    No history of anesthetic complications       ROS/MED HX  ENT/Pulmonary: Comment: Hemoptysis    Jaw mass s/p excision and graft  TMJ stiffness, undergoing rehab    (+)                tobacco use, Past use,                       Neurologic:  - neg neurologic ROS     Cardiovascular: Comment: [facility] :  20% D1    (+)  - -  CAD -  - -                                      METS/Exercise Tolerance: >4 METS    Hematologic:       Musculoskeletal:       GI/Hepatic:     (+) GERD,                   Renal/Genitourinary:  - neg Renal ROS     Endo:       Psychiatric/Substance Use:     (+) psychiatric history (PTSD from time in armed forces; does not tolerate tight spaces)  "anxiety       Infectious Disease:       Malignancy:       Other:              Physical Exam  Airway  Mallampati: III  TM distance: >3 FB  Neck ROM: full  Mouth opening: >= 4 cm    Cardiovascular   Rhythm: regular  Rate: normal rate   Comments: [facility] 2023:  20% D1  Dental Comments: Multiple missing teeth in upper right quadrant.    No reported loose or chipped teeth      Pulmonary Breath sounds clear to auscultation        Neurological   Other Findings       OUTSIDE LABS:  CBC:   Lab Results   Component Value Date    WBC 11.0 07/28/2023    WBC 10.2 07/13/2023    HGB 13.8 07/28/2023    HGB 13.3 07/13/2023    HCT 41.5 07/28/2023    HCT 40.4 07/13/2023     07/28/2023     07/13/2023     BMP:   Lab Results   Component Value Date     07/28/2023     07/13/2023    POTASSIUM 4.1 07/28/2023    POTASSIUM 4.2 07/13/2023    CHLORIDE 108 (H) 07/28/2023    CHLORIDE 107 07/13/2023    CO2 22 07/28/2023    CO2 19 (L) 07/13/2023    BUN 12.2 07/28/2023    BUN 12.2 07/13/2023    CR 0.93 07/28/2023    CR 0.82 07/13/2023    GLC 96 07/28/2023    GLC 84 07/13/2023     COAGS:   Lab Results   Component Value Date    PTT 28 06/07/2023    INR 0.98 07/28/2023    FIBR 322 06/06/2023     POC: No results found for: \"BGM\", \"HCG\", \"HCGS\"  HEPATIC:   Lab Results   Component Value Date    ALBUMIN 4.2 07/13/2023    PROTTOTAL 6.7 07/13/2023    ALT 25 07/13/2023    AST 33 07/13/2023    ALKPHOS 86 07/13/2023    BILITOTAL 0.2 07/13/2023     OTHER:   Lab Results   Component Value Date    LACT 1.8 08/14/2013    TEJAL 9.2 07/28/2023    PHOS 3.6 05/11/2013    MAG 2.5 (H) 05/11/2013    LIPASE 88 12/19/2013    AMYLASE 70 06/21/2010    CRP <5.0 06/21/2010    SED 13 06/07/2023       Anesthesia Plan    ASA Status:  2      NPO Status: NPO Appropriate   Anesthesia Type: General.  Airway: oral.   Techniques and Equipment:       - Monitoring Plan: standard ASA monitoring     Consents    Anesthesia Plan(s) and associated risks, benefits, and " "realistic alternatives discussed. Questions answered and patient/representative(s) expressed understanding.     - Discussed: CRNA     - Discussed with:  Patient          Blood Consent:      - Discussed with: not discussed.     Postoperative Care         Comments:    Other Comments: I did discuss that due to not having information of previous chest CT, unknown if there is any airway compromise with any etiology for hemoptysis that would result in complications with intubation and ventilation. However, based on symptoms (able to tolerate strenuous exercise, able to lay flat), the risk of airway complications should be low. He wishes to proceed given this discussion    Dental documentation is based on patient self-reporting for any loose or chipped teeth. Any obvious visual dental abnormalities seen in the airway exam is also documented.    Risks of anesthesia was discussed with the patient, that include risk of sore throat and hoarse voice that should be temporary on the order of days, risk of oral mucosa injury (eg. lip and tongue) , and a very rare risk of dental injury requiring repair.    Additional risks of anesthesia was discussed with the patient, including heart attack, stroke, blood clots, respiratory issues, and cardiac arrest.    Patient was given opportunity to ask questions and express concerns, which were then all addressed.                 Hayden Swann MD    I have reviewed the pertinent notes and labs in the chart from the past 30 days and (re)examined the patient.  Any updates or changes from those notes are reflected in this note.    Clinically Significant Risk Factors Present on Admission                             # Obesity: Estimated body mass index is 30.37 kg/m  as calculated from the following:    Height as of 5/14/25: 1.905 m (6' 3\").    Weight as of 5/14/25: 110.2 kg (243 lb).                    "

## 2025-05-19 ENCOUNTER — APPOINTMENT (OUTPATIENT)
Dept: GENERAL RADIOLOGY | Facility: CLINIC | Age: 55
End: 2025-05-19
Attending: INTERNAL MEDICINE
Payer: COMMERCIAL

## 2025-05-19 ENCOUNTER — APPOINTMENT (OUTPATIENT)
Dept: GENERAL RADIOLOGY | Facility: CLINIC | Age: 55
End: 2025-05-19
Attending: STUDENT IN AN ORGANIZED HEALTH CARE EDUCATION/TRAINING PROGRAM
Payer: COMMERCIAL

## 2025-05-19 ENCOUNTER — HOSPITAL ENCOUNTER (OUTPATIENT)
Dept: CT IMAGING | Facility: CLINIC | Age: 55
Discharge: HOME | End: 2025-05-19
Attending: INTERNAL MEDICINE | Admitting: INTERNAL MEDICINE
Payer: COMMERCIAL

## 2025-05-19 ENCOUNTER — ANESTHESIA (OUTPATIENT)
Dept: SURGERY | Facility: CLINIC | Age: 55
End: 2025-05-19
Payer: COMMERCIAL

## 2025-05-19 ENCOUNTER — APPOINTMENT (OUTPATIENT)
Dept: CT IMAGING | Facility: CLINIC | Age: 55
End: 2025-05-19
Attending: STUDENT IN AN ORGANIZED HEALTH CARE EDUCATION/TRAINING PROGRAM
Payer: COMMERCIAL

## 2025-05-19 ENCOUNTER — HOSPITAL ENCOUNTER (OUTPATIENT)
Facility: CLINIC | Age: 55
Discharge: HOME | End: 2025-05-19
Attending: INTERNAL MEDICINE | Admitting: INTERNAL MEDICINE
Payer: COMMERCIAL

## 2025-05-19 VITALS
HEIGHT: 75 IN | BODY MASS INDEX: 28.37 KG/M2 | WEIGHT: 228.18 LBS | SYSTOLIC BLOOD PRESSURE: 116 MMHG | TEMPERATURE: 97.5 F | RESPIRATION RATE: 16 BRPM | HEART RATE: 57 BPM | OXYGEN SATURATION: 96 % | DIASTOLIC BLOOD PRESSURE: 91 MMHG

## 2025-05-19 DIAGNOSIS — R04.2 HEMOPTYSIS: ICD-10-CM

## 2025-05-19 LAB
APPEARANCE FLD: CLEAR
CD19 CELLS NFR BRONCH: 0 %
CD3 CELLS NFR BRONCH: 96 %
CD3+CD4+ CELLS NFR BRONCH: 51 %
CD3+CD4+ CELLS/CD3+CD8+ CLL BRONCH: 1.02 %
CD3+CD8+ CELLS NFR BRONCH: 50 %
CD3-CD16+CD56+ CELLS NFR SPEC: 2 %
COLOR FLD: COLORLESS
LYMPHOCYTE SUBSET BRONCHIAL EXTERNAL COMMENT: NORMAL
LYMPHOCYTES NFR FLD MANUAL: 3 %
MONOS+MACROS NFR FLD MANUAL: 0 %
NEUTS BAND NFR FLD MANUAL: 7 %
OTHER CELLS FLD MANUAL: 91 %
SPECIMEN SOURCE FLD: NORMAL
WBC # FLD AUTO: 7 /UL

## 2025-05-19 PROCEDURE — 87102 FUNGUS ISOLATION CULTURE: CPT | Performed by: INTERNAL MEDICINE

## 2025-05-19 PROCEDURE — 89050 BODY FLUID CELL COUNT: CPT | Performed by: INTERNAL MEDICINE

## 2025-05-19 PROCEDURE — 87206 SMEAR FLUORESCENT/ACID STAI: CPT | Performed by: INTERNAL MEDICINE

## 2025-05-19 PROCEDURE — 250N000013 HC RX MED GY IP 250 OP 250 PS 637: Performed by: ANESTHESIOLOGY

## 2025-05-19 PROCEDURE — 272N000001 HC OR GENERAL SUPPLY STERILE: Performed by: INTERNAL MEDICINE

## 2025-05-19 PROCEDURE — 71275 CT ANGIOGRAPHY CHEST: CPT | Mod: 26 | Performed by: RADIOLOGY

## 2025-05-19 PROCEDURE — 31624 DX BRONCHOSCOPE/LAVAGE: CPT | Mod: GC | Performed by: INTERNAL MEDICINE

## 2025-05-19 PROCEDURE — 86356 MONONUCLEAR CELL ANTIGEN: CPT | Performed by: INTERNAL MEDICINE

## 2025-05-19 PROCEDURE — 87070 CULTURE OTHR SPECIMN AEROBIC: CPT | Performed by: INTERNAL MEDICINE

## 2025-05-19 PROCEDURE — 88312 SPECIAL STAINS GROUP 1: CPT | Mod: TC | Performed by: INTERNAL MEDICINE

## 2025-05-19 PROCEDURE — 258N000003 HC RX IP 258 OP 636: Performed by: ANESTHESIOLOGY

## 2025-05-19 PROCEDURE — 258N000003 HC RX IP 258 OP 636: Performed by: NURSE ANESTHETIST, CERTIFIED REGISTERED

## 2025-05-19 PROCEDURE — 999N000065 XR CHEST PORT 1 VIEW

## 2025-05-19 PROCEDURE — 71275 CT ANGIOGRAPHY CHEST: CPT

## 2025-05-19 PROCEDURE — 250N000011 HC RX IP 250 OP 636: Performed by: INTERNAL MEDICINE

## 2025-05-19 PROCEDURE — 71250 CT THORAX DX C-: CPT | Mod: 26 | Performed by: RADIOLOGY

## 2025-05-19 PROCEDURE — 999N000180 XR SURGERY CARM FLUORO LESS THAN 5 MIN

## 2025-05-19 PROCEDURE — 250N000011 HC RX IP 250 OP 636: Mod: JZ | Performed by: NURSE ANESTHETIST, CERTIFIED REGISTERED

## 2025-05-19 PROCEDURE — 250N000009 HC RX 250

## 2025-05-19 PROCEDURE — 88313 SPECIAL STAINS GROUP 2: CPT | Mod: 26 | Performed by: PATHOLOGY

## 2025-05-19 PROCEDURE — 31628 BRONCHOSCOPY/LUNG BX EACH: CPT | Mod: GC | Performed by: INTERNAL MEDICINE

## 2025-05-19 PROCEDURE — 88108 CYTOPATH CONCENTRATE TECH: CPT | Mod: 26 | Performed by: PATHOLOGY

## 2025-05-19 PROCEDURE — 88108 CYTOPATH CONCENTRATE TECH: CPT | Mod: TC | Performed by: INTERNAL MEDICINE

## 2025-05-19 PROCEDURE — 258N000003 HC RX IP 258 OP 636: Performed by: INTERNAL MEDICINE

## 2025-05-19 PROCEDURE — 250N000011 HC RX IP 250 OP 636: Performed by: NURSE ANESTHETIST, CERTIFIED REGISTERED

## 2025-05-19 PROCEDURE — 250N000011 HC RX IP 250 OP 636: Mod: JZ

## 2025-05-19 PROCEDURE — 710N000010 HC RECOVERY PHASE 1, LEVEL 2, PER MIN: Performed by: INTERNAL MEDICINE

## 2025-05-19 PROCEDURE — 71250 CT THORAX DX C-: CPT | Mod: XU

## 2025-05-19 PROCEDURE — 250N000009 HC RX 250: Performed by: NURSE ANESTHETIST, CERTIFIED REGISTERED

## 2025-05-19 PROCEDURE — 710N000012 HC RECOVERY PHASE 2, PER MINUTE: Performed by: INTERNAL MEDICINE

## 2025-05-19 PROCEDURE — 999N000141 HC STATISTIC PRE-PROCEDURE NURSING ASSESSMENT: Performed by: INTERNAL MEDICINE

## 2025-05-19 PROCEDURE — 31625 BRONCHOSCOPY W/BIOPSY(S): CPT | Mod: GC | Performed by: INTERNAL MEDICINE

## 2025-05-19 PROCEDURE — 370N000017 HC ANESTHESIA TECHNICAL FEE, PER MIN: Performed by: INTERNAL MEDICINE

## 2025-05-19 PROCEDURE — 360N000083 HC SURGERY LEVEL 3 W/ FLUORO, PER MIN: Performed by: INTERNAL MEDICINE

## 2025-05-19 PROCEDURE — 71045 X-RAY EXAM CHEST 1 VIEW: CPT | Mod: 26 | Performed by: RADIOLOGY

## 2025-05-19 RX ORDER — DEXAMETHASONE SODIUM PHOSPHATE 4 MG/ML
4 INJECTION, SOLUTION INTRA-ARTICULAR; INTRALESIONAL; INTRAMUSCULAR; INTRAVENOUS; SOFT TISSUE
Status: DISCONTINUED | OUTPATIENT
Start: 2025-05-19 | End: 2025-05-19 | Stop reason: HOSPADM

## 2025-05-19 RX ORDER — LIDOCAINE HYDROCHLORIDE 20 MG/ML
INJECTION, SOLUTION INFILTRATION; PERINEURAL PRN
Status: DISCONTINUED | OUTPATIENT
Start: 2025-05-19 | End: 2025-05-19

## 2025-05-19 RX ORDER — ONDANSETRON 2 MG/ML
4 INJECTION INTRAMUSCULAR; INTRAVENOUS EVERY 30 MIN PRN
Status: DISCONTINUED | OUTPATIENT
Start: 2025-05-19 | End: 2025-05-19 | Stop reason: HOSPADM

## 2025-05-19 RX ORDER — FENTANYL CITRATE 50 UG/ML
INJECTION, SOLUTION INTRAMUSCULAR; INTRAVENOUS PRN
Status: DISCONTINUED | OUTPATIENT
Start: 2025-05-19 | End: 2025-05-19

## 2025-05-19 RX ORDER — NALOXONE HYDROCHLORIDE 0.4 MG/ML
0.1 INJECTION, SOLUTION INTRAMUSCULAR; INTRAVENOUS; SUBCUTANEOUS
Status: DISCONTINUED | OUTPATIENT
Start: 2025-05-19 | End: 2025-05-19 | Stop reason: HOSPADM

## 2025-05-19 RX ORDER — ACETAMINOPHEN 325 MG/1
975 TABLET ORAL ONCE
Status: COMPLETED | OUTPATIENT
Start: 2025-05-19 | End: 2025-05-19

## 2025-05-19 RX ORDER — FENTANYL CITRATE 50 UG/ML
25 INJECTION, SOLUTION INTRAMUSCULAR; INTRAVENOUS
Status: DISCONTINUED | OUTPATIENT
Start: 2025-05-19 | End: 2025-05-19 | Stop reason: HOSPADM

## 2025-05-19 RX ORDER — SODIUM CHLORIDE, SODIUM LACTATE, POTASSIUM CHLORIDE, CALCIUM CHLORIDE 600; 310; 30; 20 MG/100ML; MG/100ML; MG/100ML; MG/100ML
INJECTION, SOLUTION INTRAVENOUS CONTINUOUS
Status: DISCONTINUED | OUTPATIENT
Start: 2025-05-19 | End: 2025-05-19 | Stop reason: HOSPADM

## 2025-05-19 RX ORDER — HYDROMORPHONE HCL IN WATER/PF 6 MG/30 ML
0.2 PATIENT CONTROLLED ANALGESIA SYRINGE INTRAVENOUS EVERY 5 MIN PRN
Status: DISCONTINUED | OUTPATIENT
Start: 2025-05-19 | End: 2025-05-19 | Stop reason: HOSPADM

## 2025-05-19 RX ORDER — HYDRALAZINE HYDROCHLORIDE 20 MG/ML
2.5-5 INJECTION INTRAMUSCULAR; INTRAVENOUS EVERY 10 MIN PRN
Status: DISCONTINUED | OUTPATIENT
Start: 2025-05-19 | End: 2025-05-19 | Stop reason: HOSPADM

## 2025-05-19 RX ORDER — IOPAMIDOL 755 MG/ML
72 INJECTION, SOLUTION INTRAVASCULAR ONCE
Status: COMPLETED | OUTPATIENT
Start: 2025-05-19 | End: 2025-05-19

## 2025-05-19 RX ORDER — FENTANYL CITRATE 50 UG/ML
50 INJECTION, SOLUTION INTRAMUSCULAR; INTRAVENOUS EVERY 5 MIN PRN
Status: DISCONTINUED | OUTPATIENT
Start: 2025-05-19 | End: 2025-05-19 | Stop reason: HOSPADM

## 2025-05-19 RX ORDER — ONDANSETRON 4 MG/1
4 TABLET, ORALLY DISINTEGRATING ORAL EVERY 30 MIN PRN
Status: DISCONTINUED | OUTPATIENT
Start: 2025-05-19 | End: 2025-05-19 | Stop reason: HOSPADM

## 2025-05-19 RX ORDER — LABETALOL HYDROCHLORIDE 5 MG/ML
10 INJECTION, SOLUTION INTRAVENOUS
Status: DISCONTINUED | OUTPATIENT
Start: 2025-05-19 | End: 2025-05-19 | Stop reason: HOSPADM

## 2025-05-19 RX ORDER — LIDOCAINE 40 MG/G
CREAM TOPICAL
Status: DISCONTINUED | OUTPATIENT
Start: 2025-05-19 | End: 2025-05-19 | Stop reason: HOSPADM

## 2025-05-19 RX ORDER — ONDANSETRON 2 MG/ML
INJECTION INTRAMUSCULAR; INTRAVENOUS PRN
Status: DISCONTINUED | OUTPATIENT
Start: 2025-05-19 | End: 2025-05-19

## 2025-05-19 RX ORDER — PROPOFOL 10 MG/ML
INJECTION, EMULSION INTRAVENOUS CONTINUOUS PRN
Status: DISCONTINUED | OUTPATIENT
Start: 2025-05-19 | End: 2025-05-19

## 2025-05-19 RX ORDER — FENTANYL CITRATE 50 UG/ML
25 INJECTION, SOLUTION INTRAMUSCULAR; INTRAVENOUS EVERY 5 MIN PRN
Status: DISCONTINUED | OUTPATIENT
Start: 2025-05-19 | End: 2025-05-19 | Stop reason: HOSPADM

## 2025-05-19 RX ORDER — PROPOFOL 10 MG/ML
INJECTION, EMULSION INTRAVENOUS PRN
Status: DISCONTINUED | OUTPATIENT
Start: 2025-05-19 | End: 2025-05-19

## 2025-05-19 RX ORDER — HYDROMORPHONE HCL IN WATER/PF 6 MG/30 ML
0.4 PATIENT CONTROLLED ANALGESIA SYRINGE INTRAVENOUS EVERY 5 MIN PRN
Status: DISCONTINUED | OUTPATIENT
Start: 2025-05-19 | End: 2025-05-19 | Stop reason: HOSPADM

## 2025-05-19 RX ORDER — OXYCODONE HYDROCHLORIDE 5 MG/1
5 TABLET ORAL
Status: DISCONTINUED | OUTPATIENT
Start: 2025-05-19 | End: 2025-05-19 | Stop reason: HOSPADM

## 2025-05-19 RX ORDER — MEPERIDINE HYDROCHLORIDE 25 MG/ML
12.5 INJECTION INTRAMUSCULAR; INTRAVENOUS; SUBCUTANEOUS EVERY 5 MIN PRN
Status: DISCONTINUED | OUTPATIENT
Start: 2025-05-19 | End: 2025-05-19 | Stop reason: HOSPADM

## 2025-05-19 RX ADMIN — PROPOFOL 200 MG: 10 INJECTION, EMULSION INTRAVENOUS at 08:19

## 2025-05-19 RX ADMIN — ACETAMINOPHEN 975 MG: 325 TABLET ORAL at 06:50

## 2025-05-19 RX ADMIN — SODIUM CHLORIDE, SODIUM LACTATE, POTASSIUM CHLORIDE, AND CALCIUM CHLORIDE: .6; .31; .03; .02 INJECTION, SOLUTION INTRAVENOUS at 08:03

## 2025-05-19 RX ADMIN — ONDANSETRON 4 MG: 2 INJECTION INTRAMUSCULAR; INTRAVENOUS at 10:00

## 2025-05-19 RX ADMIN — PROPOFOL 150 MCG/KG/MIN: 10 INJECTION, EMULSION INTRAVENOUS at 08:19

## 2025-05-19 RX ADMIN — Medication 300 MG: at 10:00

## 2025-05-19 RX ADMIN — Medication 10 MG: at 09:49

## 2025-05-19 RX ADMIN — Medication 50 MG: at 08:20

## 2025-05-19 RX ADMIN — FENTANYL CITRATE 50 MCG: 50 INJECTION INTRAMUSCULAR; INTRAVENOUS at 08:18

## 2025-05-19 RX ADMIN — DEXMEDETOMIDINE HYDROCHLORIDE 12 MCG: 100 INJECTION, SOLUTION INTRAVENOUS at 09:53

## 2025-05-19 RX ADMIN — SODIUM CHLORIDE, SODIUM LACTATE, POTASSIUM CHLORIDE, AND CALCIUM CHLORIDE: .6; .31; .03; .02 INJECTION, SOLUTION INTRAVENOUS at 10:08

## 2025-05-19 RX ADMIN — LIDOCAINE HYDROCHLORIDE 100 MG: 20 INJECTION, SOLUTION INFILTRATION; PERINEURAL at 08:18

## 2025-05-19 RX ADMIN — PHENYLEPHRINE HYDROCHLORIDE 200 MCG: 10 INJECTION INTRAVENOUS at 09:37

## 2025-05-19 RX ADMIN — PROPOFOL 40 MG: 10 INJECTION, EMULSION INTRAVENOUS at 08:42

## 2025-05-19 RX ADMIN — IOPAMIDOL 72 ML: 755 INJECTION, SOLUTION INTRAVENOUS at 08:42

## 2025-05-19 RX ADMIN — Medication 30 MG: at 08:35

## 2025-05-19 RX ADMIN — PROPOFOL 150 MCG/KG/MIN: 10 INJECTION, EMULSION INTRAVENOUS at 09:14

## 2025-05-19 RX ADMIN — PHENYLEPHRINE HYDROCHLORIDE 200 MCG: 10 INJECTION INTRAVENOUS at 09:49

## 2025-05-19 RX ADMIN — MIDAZOLAM 2 MG: 1 INJECTION INTRAMUSCULAR; INTRAVENOUS at 08:01

## 2025-05-19 ASSESSMENT — ACTIVITIES OF DAILY LIVING (ADL)
ADLS_ACUITY_SCORE: 44

## 2025-05-19 NOTE — ANESTHESIA PROCEDURE NOTES
Airway       Patient location during procedure: OR       Procedure Start/Stop Times: 5/19/2025 8:19 AM and 5/19/2025 8:23 AM  Staff -        CRNA: Darvin Bill APRN CRNA       Performed By: resident and with CRNAs       Procedure performed by resident/fellow/CRNA in presence of a teaching physician.    Consent for Airway        Urgency: elective  Indications and Patient Condition       Indications for airway management: liya-procedural       Induction type:intravenous       Mask difficulty assessment: 2 - vent by mask + OA or adjuvant +/- NMBA    Final Airway Details       Final airway type: endotracheal airway       Successful airway: ETT - single  Endotracheal Airway Details        ETT size (mm): 8.5       Cuffed: yes       Successful intubation technique: video laryngoscopy       VL Blade Size: De Guzman 4       Grade View of Cords: 1       Adjucts: stylet       Position: Right       Measured from: gums/teeth       Secured at (cm): 24       Bite block used: None    Post intubation assessment        Placement verified by: capnometry, equal breath sounds and chest rise        Number of attempts at approach: 1       Number of other approaches attempted: 1       Secured with: tape       Ease of procedure: easy       Dentition: Intact and Unchanged    Medication(s) Administered   Medication Administration Time: 5/19/2025 8:19 AM    Additional Comments       Grade II with Cochran 2, decided to use Glidescope due to patient history of oral surgery, VSS throughout

## 2025-05-19 NOTE — DISCHARGE INSTRUCTIONS
Post Bronchoscopy Patient Instructions:    May 19, 2025  Christiano A Zoch    Your procedure completed (bronchoscopy with lung biopsy and lung lavage) without any immediate complications.  You may cough up scant amount of blood for the next 12-24 hours. If you have excessive cough with blood, chest pain, shortness of breath, please report to the closest emergency room.    You may experience low grade (less than 100.5 F) fever next 24 hours, if so, you can take Tylenol. If the fever persists more than 24 hours, please contact to our office or your primary care provider.    Our office will call you with the results of samples taken during the procedure. Please note that you may get a result notification through  My Chart  before us calling you, as the Laboratories are instructed to release the results as soon as they are available to the patients and providers at the same time. Please allow your us 24-48 hours call you to discuss the results.    You may resume your diet as it was prior to procedure.    You may resume your medications after the procedure unless you are instructed to do differently.     Please follow instructions from the nursing staff upon discharge in terms of activity. In general, you should avoid any attention or motor skill requiring activities (e.g., driving or operating any motorized vehicle) for 24 hours as you might be still under the effect of sedation medications. Please make sure an adult to accompany you next 24 hours.     Should you have any question, please do not hesitate to call our office Felicita Gagnon [phone].     Please take a few moments to evaluate the care you received by me on this link: [email or web address]  Interventional Pulmonary Fellow      Contacting your Doctor -   To contact a doctor, call Dr Terry at [phone] at the Pulmonary Medicine Clinic at 8 am till 5 pm or [phone] and ask for the resident on call for Pulmonology  (answered 24 hours a day)   Emergency Department:  [facility]: [phone]  [facility]: [phone] if you are in need of immediate or emergent help

## 2025-05-19 NOTE — ANESTHESIA POSTPROCEDURE EVALUATION
Patient: Christiano Garcia    Procedure: Procedure(s):  Bronchoscopy flexible  BRONCHOSCOPY, FLEXIBLE, WITH TRANSBRONCHIAL BIOPSY, BAL, Endobronchial Biopsy       Anesthesia Type:  General    Note:  Disposition: Outpatient   Postop Pain Control: Uneventful            Sign Out: Well controlled pain   PONV:    Neuro/Psych: Uneventful            Sign Out: Acceptable/Baseline neuro status   Airway/Respiratory: Uneventful            Sign Out: Acceptable/Baseline resp. status   CV/Hemodynamics: Uneventful            Sign Out: Acceptable CV status; No obvious hypovolemia; No obvious fluid overload   Other NRE: NONE   DID A NON-ROUTINE EVENT OCCUR? No           Last vitals:  Vitals Value Taken Time   /82 05/19/25 11:00   Temp 36.4  C (97.6  F) 05/19/25 10:15   Pulse 57 05/19/25 11:06   Resp 14 05/19/25 11:06   SpO2 96 % 05/19/25 11:06   Vitals shown include unfiled device data.    Electronically Signed By: Kimberly Chaudhry MD  May 19, 2025  11:07 AM

## 2025-05-19 NOTE — ANESTHESIA CARE TRANSFER NOTE
Patient: Christiano Garcia    Procedure: Procedure(s):  Bronchoscopy flexible  BRONCHOSCOPY, FLEXIBLE, WITH TRANSBRONCHIAL BIOPSY, BAL, Endobronchial Biopsy       Diagnosis: Hemoptysis [R04.2]  Diagnosis Additional Information: No value filed.    Anesthesia Type:   General     Note:    Oropharynx: oropharynx clear of all foreign objects and spontaneously breathing  Level of Consciousness: drowsy  Oxygen Supplementation: face mask  Level of Supplemental Oxygen (L/min / FiO2): 8  Independent Airway: airway patency satisfactory and stable  Dentition: dentition unchanged  Vital Signs Stable: post-procedure vital signs reviewed and stable  Report to RN Given: handoff report given  Patient transferred to: PACU    Handoff Report: Identifed the Patient, Identified the Reponsible Provider, Reviewed the pertinent medical history, Discussed the surgical course, Reviewed Intra-OP anesthesia mangement and issues during anesthesia, Set expectations for post-procedure period and Allowed opportunity for questions and acknowledgement of understanding      Vitals:  Vitals Value Taken Time   /83 05/19/25 10:12   Temp 36    Pulse 67 05/19/25 10:14   Resp 15 05/19/25 10:14   SpO2 97 % 05/19/25 10:14   Vitals shown include unfiled device data.    Electronically Signed By: SO Page CRNA  May 19, 2025  10:14 AM

## 2025-05-19 NOTE — OR NURSING
Confirmed with CT, pulm fellow and Dr Swann regarding plan for general during CT at 0740. Pt prepped and ready.

## 2025-05-19 NOTE — PROCEDURES
INTERVENTIONAL PULMONOLOGY       Procedure(s):    A flexible bronchoscopy  Airway exam  BAL  Endobronchial biopsies (1 sites)  Fluoroscopic guidance   Transbronchial forcep biopsies (1 sites)    Indication:  hemoptysis investigation    Attending of Record:  Amy Terry MD     Interventional Pulmonary Fellow   Chris Carey    Trainees Present:   None     Medications:    General Anesthesia - See anesthesia flowsheet for details    Sedation Time:   Per Anesthesia Care Provider    Time Out:  Performed    The patient's medical record has been reviewed.  The indication for the procedure was reviewed.  The necessary history and physical examination was performed and reviewed.  The risks, benefits and alternatives of the procedure were discussed with the the patient in detail and he had the opportunity to ask questions.  I discussed in particular the potential complications including risks of minor or life-threatening bleeding and/or infection, respiratory failure, vocal cord trauma / paralysis, pneumothorax, and discomfort. Sedation risks were also discussed including abnormal heart rhythms, low blood pressure, and respiratory failure. All questions were answered to the best of my ability.  Verbal and written informed consent was obtained.  The proposed procedure and the patient's identification were verified prior to the procedure by the physician and the nurse.    The patient was assessed for the adequacy for the procedure and to receive medications.   Mental Status:  Alert and oriented x 3  Airway examination:  Class I (Complete visualization of soft palate)  Pulmonary:  Non labored respirations  CV:  Regular rate  ASA Grade:  (II)  Mild systemic disease    After clinical evaluation and reviewing the indication, risks, alternatives and benefits of the procedure the patient was deemed to be in satisfactory condition to undergo the procedure.      Immediately before administration of medications the patient was  re-assessed for adequacy to receive sedatives including the heart rate, respiratory rate, mental status, oxygen saturation, blood pressure and adequacy of pulmonary ventilation. These same parameters were continuously monitored throughout the procedure.    A Tuberculosis risk assessment was performed:  The patient has no known RISK of Tuberculosis    The procedure was performed in a negative airflow room: The patient could not be moved to a negative airflow room because of needed OR for the procedure    Maneuvers / Procedure:      Airway Examination: A complete airway examination was performed from the distal trachea to the subsegmental level in each lobe of both lungs.  Pertinent findings include possible diffuse increase capillaries, no endobronchial lesions.         BAL: The bronchoscope was wedged into the RLL medial basal segment. A total of 120cc of saline was instilled and a total of 355 ml was aspirated. This was sent for analysis.     Endobronchial biopsies: One Site - The bronchoscope was inserted.  Topical epinephrine was administered.  The Forceps were introduced and endobronchial biopsies were obtained from Main Kristen.  A total of 6 biopsies were obtained.    Transbronchial biopsies: One Site - The bronchoscope was inserted into the RLL Medial Segment.  Epinephrine was not administered.  The biopsy forceps were then advanced with fluoroscopic guidance.  A total of 10 biopsies were obtained.     Any disposable equipment was visually inspected and deemed to be intact immediately post procedure.      Relevant Pictures  MC view      Assessment and Recommendations:   Successful completion of flexible bronchoscopy with RLL transbronchial biopsy, main kristen endobronchial biopsy, and BAL from the RLL.   Airways exam: possible diffuse increase capillaries, no endobronchial lesions.       Ok to discharge once medically stable.   Follow up with Dr. Franklin.           Chris Carey  Interventional pulmonary  fellow  Pager: [phone]

## 2025-05-20 LAB
ACID FAST STAIN (ARUP): NORMAL
ACID FAST STAIN (ARUP): NORMAL

## 2025-05-21 LAB
ACID FAST STAIN (ARUP): NORMAL
ACID FAST STAIN (ARUP): NORMAL
BACTERIA BRONCH: NORMAL
PATH REPORT.COMMENTS IMP SPEC: NORMAL
PATH REPORT.FINAL DX SPEC: NORMAL
PATH REPORT.FINAL DX SPEC: NORMAL
PATH REPORT.GROSS SPEC: NORMAL
PATH REPORT.GROSS SPEC: NORMAL
PATH REPORT.MICROSCOPIC SPEC OTHER STN: NORMAL
PATH REPORT.MICROSCOPIC SPEC OTHER STN: NORMAL
PATH REPORT.RELEVANT HX SPEC: NORMAL
PATH REPORT.RELEVANT HX SPEC: NORMAL
PHOTO IMAGE: NORMAL

## 2025-05-22 ENCOUNTER — DOCUMENTATION ONLY (OUTPATIENT)
Dept: PULMONOLOGY | Facility: CLINIC | Age: 55
End: 2025-05-22
Payer: COMMERCIAL

## 2025-05-22 LAB
BACTERIA BRONCH: NORMAL
BACTERIA TISS BX CULT: NORMAL

## 2025-05-22 NOTE — NURSING NOTE
Path results forwarded to Dr. Franklin (referring provider).    Cytology, non-gynecologic: [id number]  Order: [id number]   Collected 5/19/2025  9:29 AM       Status: Final result    Test Result Released: Yes (seen)    0 Result Notes      Component    Final Diagnosis   A. LUNG, LOWER LOBE, RIGHT, RIGHT  LL BAL ( EVALUATE FOR LIPOID PNEUMONIA), BRONCHIAL ALVEOLAR LAVAGE:  Interpretation -               - No morphologic evidence of malignancy              - Lipid laden macrophage index is <20     Adequacy: Satisfactory for evaluation         Surgical Pathology Exam: [id number]  Order: [id number]   Collected 5/19/2025  9:46 AM       Status: Final result    Test Result Released: Yes (seen)    0 Result Notes       Component  Resulting Agency   Case Report   Surgical Pathology Report                         Case: [id number]                                   Authorizing Provider:  Amy Terry MD    Collected:           05/19/2025 09:46 AM           Ordering Location:     [facility] OR                 Received:            05/19/2025 10:35 AM           Pathologist:           Elizabeth Arizmendi MD                                                                           Specimens:   A) - Lung, Lower Lobe, Right, Right LL Transbronchial Biopsy                                        B) - Lung, Lower Lobe, Right, Main Ariadna Endobronchial Biopsy                            Final Diagnosis   A. LUNG, RIGHT LOWER LOBE, TRANSBRONCHIAL BIOPSY:  - Minute fragments of lung tissue with focal chronic inflammation and intra-alveolar macrophages   - No evidence of granulomas and malignancy      B. LUNG, MAIN ARIADNA, ENDOBRONCHIAL BIOPSY:   - Fragments of bronchial wall with focal chronic inflammation  - No evidence of granulomas and malignancy

## 2025-05-29 LAB
BACTERIA BRONCH: NORMAL
BACTERIA TISS BX CULT: NORMAL

## 2025-06-05 LAB
BACTERIA BRONCH: NORMAL
BACTERIA TISS BX CULT: NORMAL

## 2025-06-12 LAB
BACTERIA BRONCH: NORMAL
BACTERIA TISS BX CULT: NORMAL

## 2025-06-16 LAB
BACTERIA BRONCH: NO GROWTH
BACTERIA TISS BX CULT: NO GROWTH

## 2025-06-19 ENCOUNTER — HOSPITAL ENCOUNTER (OUTPATIENT)
Facility: CLINIC | Age: 55
End: 2025-06-19
Payer: COMMERCIAL

## 2025-06-21 ENCOUNTER — HEALTH MAINTENANCE LETTER (OUTPATIENT)
Age: 55
End: 2025-06-21

## 2025-06-25 ENCOUNTER — VIRTUAL VISIT (OUTPATIENT)
Dept: SURGERY | Facility: CLINIC | Age: 55
End: 2025-06-25
Payer: COMMERCIAL

## 2025-06-25 ENCOUNTER — TRANSFERRED RECORDS (OUTPATIENT)
Dept: HEALTH INFORMATION MANAGEMENT | Facility: CLINIC | Age: 55
End: 2025-06-25

## 2025-06-25 DIAGNOSIS — M51.26 HERNIATED NUCLEUS PULPOSUS, LUMBAR: ICD-10-CM

## 2025-06-25 DIAGNOSIS — Z01.818 PREOP EXAMINATION: ICD-10-CM

## 2025-06-25 DIAGNOSIS — Z53.9 ERRONEOUS ENCOUNTER--DISREGARD: Primary | ICD-10-CM

## 2025-06-25 ASSESSMENT — LIFESTYLE VARIABLES: TOBACCO_USE: 1

## 2025-06-25 ASSESSMENT — ENCOUNTER SYMPTOMS: SEIZURES: 0

## 2025-06-25 NOTE — H&P
"  ERRONEOUS ENCOUNTER     This visit was not completed. The patient was very belligerent and verbally abusive with rooming staff and with this writer. He states that his \"surgery\" is on 6/30 at [facility] (this visit is for a H&P for a MRI scan scheduled on 7/1 at [facility]). A scheduled surgery at [facility] on 6/30 is not identified in Epic.    After 3+ minutes of attempted conversation with patient to clarify the situation, this writer informed patient that she was going to hang up and notify a manager regarding this issue. The patient continued to yell without interruption and this writer ultimately hung up. Staff message was sent to PAC manager to inform.        MARICRUZ Garcia is a 55 year old male who presents for pre-operative H & P in preparation for  Procedure Information       Case: [id number] Date/Time: 07/01/25 1040    Procedure: MRI OF LUMBAR SPINE WITHOUT CONTRAST (Spine)    Anesthesia type: General    Diagnosis: Herniated nucleus pulposus, lumbar [M51.26]    Pre-op diagnosis: Herniated nucleus pulposus, lumbar [M51.26]    Location: [facility] OR MRI /  OR    Providers: GENERIC ANESTHESIA PROVIDER            Patient is being evaluated for comorbid conditions of CAD, HLD, GERD, tobacco use, PONV, bipolar disease, anxiety, depression, insomnia, PTSD.    Mr. Garcia underwent L5-S1 laminotomy and left L5-S1 discectomy on 5/21/25 at [facility]. He now presents for the above procedure due to ongoing pain. Due to severe PTSD, patient requires general anesthesia for MRI scans.       Anesthesia Evaluation   Pt has had prior anesthetic.     History of anesthetic complications  - PONV.      ROS/MED HX  ENT/Pulmonary:     (+)                tobacco use, Past use,             recent URI,  Started Augmentin 6/16 for sinusitis:        Neurologic:  - neg neurologic ROS  (-) no seizures and no CVA   Cardiovascular:     (+) Dyslipidemia - -   -  - -                                 Previous cardiac testing   Echo: Date: " Results:    Stress Test:  Date: Results:    ECG Reviewed:  Date: 11/2024 Results:  Normal sinus rhythm   Left axis deviation   Abnormal ECG     Cath:  Date: 7/2023 Results:     1st Diag lesion is 20% stenosed.  Angiogram without significant CAD.   (-) taking anticoagulants/antiplatelets   METS/Exercise Tolerance: >4 METS Comment: 10 miles walk/run most day. Denies CP, some AMAYA with running long distances    Hematologic:  - neg hematologic  ROS  (-) history of blood clots and history of blood transfusion   Musculoskeletal:  - neg musculoskeletal ROS     GI/Hepatic:     (+) GERD, Asymptomatic on medication,                  Renal/Genitourinary: Comment: H/o stones      Endo:  - neg endo ROS  (-) chronic steroid usage   Psychiatric/Substance Use:     (+) psychiatric history bipolar, anxiety and depression       Infectious Disease:  - neg infectious disease ROS     Malignancy:       Other:

## 2025-06-27 ENCOUNTER — ANESTHESIA EVENT (OUTPATIENT)
Dept: MRI IMAGING | Facility: HOSPITAL | Age: 55
End: 2025-06-27
Payer: COMMERCIAL

## 2025-06-27 RX ORDER — ATORVASTATIN CALCIUM 80 MG/1
80 TABLET, FILM COATED ORAL DAILY
COMMUNITY

## 2025-06-27 RX ORDER — CALCIUM CARBONATE 500 MG/1
1 TABLET, CHEWABLE ORAL EVERY 6 HOURS PRN
COMMUNITY

## 2025-06-30 ENCOUNTER — HOSPITAL ENCOUNTER (OUTPATIENT)
Dept: MRI IMAGING | Facility: HOSPITAL | Age: 55
Discharge: HOME | End: 2025-06-30
Attending: SPECIALIST
Payer: COMMERCIAL

## 2025-06-30 ENCOUNTER — ANESTHESIA (OUTPATIENT)
Dept: MRI IMAGING | Facility: HOSPITAL | Age: 55
End: 2025-06-30
Payer: COMMERCIAL

## 2025-06-30 VITALS
WEIGHT: 228 LBS | DIASTOLIC BLOOD PRESSURE: 99 MMHG | SYSTOLIC BLOOD PRESSURE: 132 MMHG | RESPIRATION RATE: 18 BRPM | HEIGHT: 75 IN | BODY MASS INDEX: 28.35 KG/M2 | HEART RATE: 69 BPM | OXYGEN SATURATION: 99 % | TEMPERATURE: 98.2 F

## 2025-06-30 DIAGNOSIS — M51.26 HNP (HERNIATED NUCLEUS PULPOSUS), LUMBAR: ICD-10-CM

## 2025-06-30 PROCEDURE — A9585 GADOBUTROL INJECTION: HCPCS | Performed by: ANESTHESIOLOGY

## 2025-06-30 PROCEDURE — 258N000003 HC RX IP 258 OP 636: Performed by: ANESTHESIOLOGY

## 2025-06-30 PROCEDURE — 250N000011 HC RX IP 250 OP 636: Performed by: NURSE ANESTHETIST, CERTIFIED REGISTERED

## 2025-06-30 PROCEDURE — 72158 MRI LUMBAR SPINE W/O & W/DYE: CPT

## 2025-06-30 PROCEDURE — 250N000009 HC RX 250: Performed by: ANESTHESIOLOGY

## 2025-06-30 PROCEDURE — 250N000011 HC RX IP 250 OP 636: Performed by: ANESTHESIOLOGY

## 2025-06-30 PROCEDURE — 255N000002 HC RX 255 OP 636: Performed by: ANESTHESIOLOGY

## 2025-06-30 RX ORDER — ONDANSETRON 2 MG/ML
4 INJECTION INTRAMUSCULAR; INTRAVENOUS EVERY 30 MIN PRN
Status: DISCONTINUED | OUTPATIENT
Start: 2025-06-30 | End: 2025-07-01 | Stop reason: HOSPADM

## 2025-06-30 RX ORDER — GADOBUTROL 604.72 MG/ML
10 INJECTION INTRAVENOUS ONCE
Status: COMPLETED | OUTPATIENT
Start: 2025-06-30 | End: 2025-06-30

## 2025-06-30 RX ORDER — FENTANYL CITRATE 0.05 MG/ML
INJECTION, SOLUTION INTRAMUSCULAR; INTRAVENOUS PRN
Status: DISCONTINUED | OUTPATIENT
Start: 2025-06-30 | End: 2025-06-30

## 2025-06-30 RX ORDER — FENTANYL CITRATE 50 UG/ML
25 INJECTION, SOLUTION INTRAMUSCULAR; INTRAVENOUS EVERY 5 MIN PRN
Status: DISCONTINUED | OUTPATIENT
Start: 2025-06-30 | End: 2025-07-01 | Stop reason: HOSPADM

## 2025-06-30 RX ORDER — ONDANSETRON 4 MG/1
4 TABLET, ORALLY DISINTEGRATING ORAL EVERY 30 MIN PRN
Status: DISCONTINUED | OUTPATIENT
Start: 2025-06-30 | End: 2025-07-01 | Stop reason: HOSPADM

## 2025-06-30 RX ORDER — GLYCOPYRROLATE 0.2 MG/ML
INJECTION, SOLUTION INTRAMUSCULAR; INTRAVENOUS PRN
Status: DISCONTINUED | OUTPATIENT
Start: 2025-06-30 | End: 2025-06-30

## 2025-06-30 RX ORDER — LIDOCAINE 40 MG/G
CREAM TOPICAL
Status: DISCONTINUED | OUTPATIENT
Start: 2025-06-30 | End: 2025-07-01 | Stop reason: HOSPADM

## 2025-06-30 RX ORDER — OXYCODONE HYDROCHLORIDE 5 MG/1
5 TABLET ORAL
Refills: 0 | OUTPATIENT
Start: 2025-06-30

## 2025-06-30 RX ORDER — EPHEDRINE SULFATE 50 MG/ML
INJECTION, SOLUTION INTRAMUSCULAR; INTRAVENOUS; SUBCUTANEOUS PRN
Status: DISCONTINUED | OUTPATIENT
Start: 2025-06-30 | End: 2025-06-30

## 2025-06-30 RX ORDER — FENTANYL CITRATE 50 UG/ML
50 INJECTION, SOLUTION INTRAMUSCULAR; INTRAVENOUS EVERY 5 MIN PRN
Status: DISCONTINUED | OUTPATIENT
Start: 2025-06-30 | End: 2025-07-01 | Stop reason: HOSPADM

## 2025-06-30 RX ORDER — DEXAMETHASONE SODIUM PHOSPHATE 10 MG/ML
4 INJECTION, SOLUTION INTRAMUSCULAR; INTRAVENOUS
Status: DISCONTINUED | OUTPATIENT
Start: 2025-06-30 | End: 2025-07-01 | Stop reason: HOSPADM

## 2025-06-30 RX ORDER — OXYCODONE HYDROCHLORIDE 5 MG/1
10 TABLET ORAL
Refills: 0 | OUTPATIENT
Start: 2025-06-30

## 2025-06-30 RX ORDER — DEXAMETHASONE SODIUM PHOSPHATE 10 MG/ML
4 INJECTION, SOLUTION INTRAMUSCULAR; INTRAVENOUS
OUTPATIENT
Start: 2025-06-30

## 2025-06-30 RX ORDER — KETAMINE HYDROCHLORIDE 10 MG/ML
INJECTION INTRAMUSCULAR; INTRAVENOUS PRN
Status: DISCONTINUED | OUTPATIENT
Start: 2025-06-30 | End: 2025-06-30

## 2025-06-30 RX ORDER — PROPOFOL 10 MG/ML
INJECTION, EMULSION INTRAVENOUS PRN
Status: DISCONTINUED | OUTPATIENT
Start: 2025-06-30 | End: 2025-06-30

## 2025-06-30 RX ORDER — ONDANSETRON 2 MG/ML
4 INJECTION INTRAMUSCULAR; INTRAVENOUS EVERY 30 MIN PRN
OUTPATIENT
Start: 2025-06-30

## 2025-06-30 RX ORDER — SODIUM CHLORIDE, SODIUM LACTATE, POTASSIUM CHLORIDE, CALCIUM CHLORIDE 600; 310; 30; 20 MG/100ML; MG/100ML; MG/100ML; MG/100ML
INJECTION, SOLUTION INTRAVENOUS CONTINUOUS
Status: DISCONTINUED | OUTPATIENT
Start: 2025-06-30 | End: 2025-07-01 | Stop reason: HOSPADM

## 2025-06-30 RX ORDER — NALOXONE HYDROCHLORIDE 0.4 MG/ML
0.1 INJECTION, SOLUTION INTRAMUSCULAR; INTRAVENOUS; SUBCUTANEOUS
OUTPATIENT
Start: 2025-06-30

## 2025-06-30 RX ORDER — ONDANSETRON 4 MG/1
4 TABLET, ORALLY DISINTEGRATING ORAL EVERY 30 MIN PRN
OUTPATIENT
Start: 2025-06-30

## 2025-06-30 RX ORDER — NALOXONE HYDROCHLORIDE 0.4 MG/ML
0.1 INJECTION, SOLUTION INTRAMUSCULAR; INTRAVENOUS; SUBCUTANEOUS
Status: DISCONTINUED | OUTPATIENT
Start: 2025-06-30 | End: 2025-07-01 | Stop reason: HOSPADM

## 2025-06-30 RX ADMIN — PHENYLEPHRINE HYDROCHLORIDE 100 MCG: 10 INJECTION INTRAVENOUS at 08:47

## 2025-06-30 RX ADMIN — PHENYLEPHRINE HYDROCHLORIDE 100 MCG: 10 INJECTION INTRAVENOUS at 08:14

## 2025-06-30 RX ADMIN — SODIUM CHLORIDE, SODIUM LACTATE, POTASSIUM CHLORIDE, AND CALCIUM CHLORIDE: .6; .31; .03; .02 INJECTION, SOLUTION INTRAVENOUS at 07:45

## 2025-06-30 RX ADMIN — FENTANYL CITRATE 100 MCG: 0.05 INJECTION, SOLUTION INTRAMUSCULAR; INTRAVENOUS at 07:57

## 2025-06-30 RX ADMIN — PHENYLEPHRINE HYDROCHLORIDE 100 MCG: 10 INJECTION INTRAVENOUS at 08:07

## 2025-06-30 RX ADMIN — LIDOCAINE HYDROCHLORIDE 50 MG: 10 INJECTION, SOLUTION EPIDURAL; INFILTRATION; INTRACAUDAL; PERINEURAL at 07:58

## 2025-06-30 RX ADMIN — KETAMINE HYDROCHLORIDE 50 MG: 10 INJECTION INTRAMUSCULAR; INTRAVENOUS at 07:59

## 2025-06-30 RX ADMIN — PHENYLEPHRINE HYDROCHLORIDE 200 MCG: 10 INJECTION INTRAVENOUS at 08:11

## 2025-06-30 RX ADMIN — ONDANSETRON 4 MG: 2 INJECTION INTRAMUSCULAR; INTRAVENOUS at 09:10

## 2025-06-30 RX ADMIN — PROPOFOL 200 MG: 10 INJECTION, EMULSION INTRAVENOUS at 07:58

## 2025-06-30 RX ADMIN — SODIUM CHLORIDE 8 MCG: 9 INJECTION, SOLUTION INTRAVENOUS at 09:10

## 2025-06-30 RX ADMIN — Medication 5 MG: at 08:15

## 2025-06-30 RX ADMIN — GLYCOPYRROLATE 0.2 MG: 0.2 INJECTION, SOLUTION INTRAMUSCULAR; INTRAVENOUS at 08:10

## 2025-06-30 RX ADMIN — GADOBUTROL 10 ML: 604.72 INJECTION INTRAVENOUS at 08:54

## 2025-06-30 RX ADMIN — MIDAZOLAM HYDROCHLORIDE 2 MG: 1 INJECTION, SOLUTION INTRAMUSCULAR; INTRAVENOUS at 07:42

## 2025-06-30 RX ADMIN — Medication 5 MG: at 08:43

## 2025-06-30 NOTE — ANESTHESIA PROCEDURE NOTES
Airway       Patient location during procedure: OR       Procedure Start/Stop Times: 6/30/2025 8:01 AM  Staff -        CRNA: Yanet Haynes APRN CRNA       Performed By: CRNAIndications and Patient Condition       Indications for airway management: liya-procedural       Induction type:intravenous       Mask difficulty assessment: 0 - not attempted    Final Airway Details       Final airway type: supraglottic airway    Supraglottic Airway Details        Type: LMA       Brand: I-Gel       LMA size: 5    Post intubation assessment        Placement verified by: capnometry and equal breath sounds        Number of attempts at approach: 1       Secured with: tape       Ease of procedure: easy       Dentition: Intact    Medication(s) Administered   Medication Administration Time: 6/30/2025 8:01 AM

## 2025-06-30 NOTE — ANESTHESIA CARE TRANSFER NOTE
Patient: Christiano Garcia    Procedure: * No procedures listed *  MR LUMBAR SPINE W/O CONTRAST    Diagnosis: * No pre-op diagnosis entered *  Diagnosis Additional Information: No value filed.    Anesthesia Type:   General     Note:    Oropharynx: oropharynx clear of all foreign objects  Level of Consciousness: drowsy  Oxygen Supplementation: face mask  Level of Supplemental Oxygen (L/min / FiO2): 8  Independent Airway: airway patency satisfactory and stable  Dentition: dentition unchanged  Vital Signs Stable: post-procedure vital signs reviewed and stable  Report to RN Given: handoff report given  Patient transferred to: PACU    Handoff Report: Identifed the Patient, Identified the Reponsible Provider, Reviewed the pertinent medical history, Discussed the surgical course, Reviewed Intra-OP anesthesia mangement and issues during anesthesia, Set expectations for post-procedure period and Allowed opportunity for questions and acknowledgement of understanding      Vitals:  Vitals Value Taken Time   /71 06/30/25 09:30   Temp 98.0    Pulse 74 06/30/25 09:41   Resp 12 06/30/25 09:41   SpO2 97 % 06/30/25 09:41   Vitals shown include unfiled device data.    Electronically Signed By: SO Jc CRNA  June 30, 2025  9:42 AM

## 2025-06-30 NOTE — ANESTHESIA POSTPROCEDURE EVALUATION
Patient: Christiano Garcia    Procedure: * No procedures listed *  MR LUMBAR SPINE W/O CONTRAST    Anesthesia Type:  General    Note:  Disposition: Outpatient   Postop Pain Control: Uneventful            Sign Out: Well controlled pain   PONV: No   Neuro/Psych: Uneventful            Sign Out: Acceptable/Baseline neuro status   Airway/Respiratory: Uneventful            Sign Out: Acceptable/Baseline resp. status   CV/Hemodynamics: Uneventful            Sign Out: Acceptable CV status; No obvious hypovolemia; No obvious fluid overload   Other NRE: NONE   DID A NON-ROUTINE EVENT OCCUR? No           Last vitals:  Vitals:    06/30/25 0945 06/30/25 1000 06/30/25 1015   BP: (!) 146/95 (!) 141/97 (!) 132/99   Pulse: 69 62 69   Resp: 18     Temp: 36.8  C (98.2  F)     SpO2: 98% 98% 99%       Electronically Signed By: Austen Evans MD  June 30, 2025  10:35 AM

## 2025-06-30 NOTE — ANESTHESIA PREPROCEDURE EVALUATION
Anesthesia Pre-Procedure Evaluation    Patient: Christiano Garcia   MRN: [id number] : 1970          Procedure : * No procedures listed *  MR LUMBAR SPINE W/O CONTRAST      Past Medical History:   Diagnosis Date    Adenomatous colon polyp     Anxiety and depression     Bipolar disease, chronic (H)     CAD (coronary artery disease)     s/p MI    Chest pain     Encounter for screening for endocrine disorder     Esophagitis     Feeding disorder associated with insult to gastrointestinal tract     GERD (gastroesophageal reflux disease)     h/o Acute intermittent porphyria     per patient, doubt raised by GI during 2013 admission    Headache     Hemoptysis     History of colonic polyps     Hypercholesteremia     Insomnia     Jaw mass     Kidney stones     Lingular pneumonia     Lumbar back pain with radiculopathy affecting left lower extremity     Pain of maxillary sinus     PONV (postoperative nausea and vomiting)     PUD (peptic ulcer disease)     Stress at home     Tobacco use disorder, severe, dependence     Tooth pain       Past Surgical History:   Procedure Laterality Date    ANESTHESIA OUT OF OR MRI N/A 2025    Procedure: MRI of Cervical Spine without contrast and MRI Lumbar Spine without contrast;  Surgeon: GENERIC ANESTHESIA PROVIDER;  Location: [facility] OR    BACK SURGERY      BRONCHOSCOPY (RIGID OR FLEXIBLE), DIAGNOSTIC N/A 2023    Procedure: BRONCHOALVEOLAR LAVAGE;  Surgeon: Rochelle Burks MD;  Location: [facility] OR    BRONCHOSCOPY FLEXIBLE N/A 2025    Procedure: Bronchoscopy flexible;  Surgeon: Amy Terry MD;  Location: [facility] OR    BRONCHOSCOPY FLEXIBLE AND RIGID N/A 2023    Procedure: BRONCHOSCOPY,  Airway Inspection;  Surgeon: Rochelle Burks MD;  Location: [facility] OR    BRONCHOSCOPY FLEXIBLE,L CRYOBIOPSY N/A 2025    Procedure: BRONCHOSCOPY, FLEXIBLE, WITH TRANSBRONCHIAL BIOPSY, Bronchoalveolar lavage, Endobronchial Biopsy;  Surgeon: Amy Terry MD;  Location: [facility] OR    CV  CORONARY ANGIOGRAM N/A 2023    Procedure: Coronary Angiogram;  Surgeon: Heriberto Flowers MD;  Location:  [facility]    ENDOSCOPIC BALLOON SINUPLASTY, OPTICAL TRACKING SYSTEM ENDOSCOPIC SINUS SURGERY, COMBINED Right 2024    Procedure: FUNCTIONAL ENDOSCOPIC Right maxillary and Right Frontal SINUS SURGERY, WITH BALLOON SINUPLASTY.;  Surgeon: Jesús Amezcua MD;  Location: [facility] OR    ESOPHAGOSCOPY, GASTROSCOPY, DUODENOSCOPY (EGD), COMBINED  2013    Procedure: COMBINED ESOPHAGOSCOPY, GASTROSCOPY, DUODENOSCOPY (EGD), BIOPSY SINGLE OR MULTIPLE;  EGD with cold biopsy for h pylori;  Surgeon: Jen Galo MD;  Location:  GI    ESOPHAGOSCOPY, GASTROSCOPY, DUODENOSCOPY (EGD), COMBINED  2013    Procedure: COMBINED ESOPHAGOSCOPY, GASTROSCOPY, DUODENOSCOPY (EGD);  Esophagoscopy,Gastroscopy,Duodenoscopy- Room 504;  Surgeon: Jimenez King DO;  Location:  GI    IR LUMBAR PUNCTURE  2012    IR LUMBAR PUNCTURE  2012    LAPAROTOMY EXPLORATORY  1995      Allergies   Allergen Reactions    Cats Itching      Social History     Tobacco Use    Smoking status: Former     Current packs/day: 0.00     Average packs/day: 2.0 packs/day for 40.6 years (81.2 ttl pk-yrs)     Types: Cigarettes     Start date:      Quit date: 2024     Years since quittin.9    Smokeless tobacco: Never    Tobacco comments:     Quit early January   Substance Use Topics    Alcohol use: No      Wt Readings from Last 1 Encounters:   25 103.4 kg (228 lb)        Anesthesia Evaluation            ROS/MED HX  ENT/Pulmonary:       Neurologic:       Cardiovascular:     (+)  - -  CAD -  - -             fainting (syncope).                         METS/Exercise Tolerance:     Hematologic:       Musculoskeletal:   (+)  arthritis,             GI/Hepatic:     (+) GERD,                   Renal/Genitourinary:     (+) renal disease,             Endo:       Psychiatric/Substance Use:      "  Infectious Disease:       Malignancy:       Other:      (+)  , H/O Chronic Pain,           Physical Exam  Airway  Mallampati: III  TM distance: >3 FB  Neck ROM: full  Mouth opening: >= 4 cm    Cardiovascular   Rhythm: regular  Rate: normal rate     Dental Comments: Multiple missing teeth in upper right quadrant.    No reported loose or chipped teeth      Pulmonary Breath sounds clear to auscultation        Neurological   Other Findings       OUTSIDE LABS:  CBC:   Lab Results   Component Value Date    WBC 11.0 07/28/2023    WBC 10.2 07/13/2023    HGB 13.8 07/28/2023    HGB 13.3 07/13/2023    HCT 41.5 07/28/2023    HCT 40.4 07/13/2023     07/28/2023     07/13/2023     BMP:   Lab Results   Component Value Date     07/28/2023     07/13/2023    POTASSIUM 4.1 07/28/2023    POTASSIUM 4.2 07/13/2023    CHLORIDE 108 (H) 07/28/2023    CHLORIDE 107 07/13/2023    CO2 22 07/28/2023    CO2 19 (L) 07/13/2023    BUN 12.2 07/28/2023    BUN 12.2 07/13/2023    CR 0.93 07/28/2023    CR 0.82 07/13/2023    GLC 96 07/28/2023    GLC 84 07/13/2023     COAGS:   Lab Results   Component Value Date    PTT 28 06/07/2023    INR 0.98 07/28/2023    FIBR 322 06/06/2023     POC: No results found for: \"BGM\", \"HCG\", \"HCGS\"  HEPATIC:   Lab Results   Component Value Date    ALBUMIN 4.2 07/13/2023    PROTTOTAL 6.7 07/13/2023    ALT 25 07/13/2023    AST 33 07/13/2023    ALKPHOS 86 07/13/2023    BILITOTAL 0.2 07/13/2023     OTHER:   Lab Results   Component Value Date    LACT 1.8 08/14/2013    TEJAL 9.2 07/28/2023    PHOS 3.6 05/11/2013    MAG 2.5 (H) 05/11/2013    LIPASE 88 12/19/2013    AMYLASE 70 06/21/2010    CRP <5.0 06/21/2010    SED 13 06/07/2023       Anesthesia Plan    ASA Status:  3      NPO Status: NPO Appropriate   Anesthesia Type: General.  Airway: supraglottic airway.  Induction: intravenous.  Maintenance: Inhalation.   Techniques and Equipment:     - Airway:  Planned airway equipment includes supraglottic airway.   " "  - Monitoring Plan: standard ASA monitoring     Consents    Anesthesia Plan(s) and associated risks, benefits, and realistic alternatives discussed. Questions answered and patient/representative(s) expressed understanding.     - Discussed: CRNA     - Discussed with:  Patient               Postoperative Care    Pain management: non-narcotic analgesics, plan for postoperative opioid use.     Comments:    Other Comments: Risks and benefits of LMA discussed including but not limited to need for GERD/bronchospasm, failure and need for conversion to endotracheal intubation               Austen Evans MD    I have reviewed the pertinent notes and labs in the chart from the past 30 days and (re)examined the patient.  Any updates or changes from those notes are reflected in this note.    Clinically Significant Risk Factors Present on Admission                             # Overweight: Estimated body mass index is 28.5 kg/m  as calculated from the following:    Height as of this encounter: 1.905 m (6' 3\").    Weight as of this encounter: 103.4 kg (228 lb).                    "

## 2025-06-30 NOTE — ANESTHESIA POSTPROCEDURE EVALUATION
Patient: Christiano Garcia    Procedure: * No procedures listed *  MR LUMBAR SPINE W/O CONTRAST    Anesthesia Type:  General    Note:  Anesthesia Post Evaluation    Last vitals:  Vitals:    06/30/25 0945 06/30/25 1000 06/30/25 1015   BP: (!) 146/95 (!) 141/97 (!) 132/99   Pulse: 69 62 69   Resp: 18     Temp: 36.8  C (98.2  F)     SpO2: 98% 98% 99%       Electronically Signed By: Austen Evans MD  June 30, 2025  3:56 PM

## 2025-06-30 NOTE — PROGRESS NOTES
Instructions given to patient and Kaveh, both verbalized inderstanding. Discharged to home with [facility]ave[facility] worker.

## 2025-07-14 LAB
ACID FAST STAIN (ARUP): NORMAL

## 2025-07-15 LAB
ACID FAST STAIN (ARUP): NORMAL

## (undated) DEVICE — SPLINT INTRANASAL POSISEPX GEL SPONGE 9210584

## (undated) DEVICE — SYR 30ML SLIP TIP W/O NDL 302833

## (undated) DEVICE — GLOVE SURG PI ULTRA TOUCH M SZ 7-1/2 LF

## (undated) DEVICE — TRACKER ENT OTS INSTRUMENT FUSION 9733533

## (undated) DEVICE — PREP DYNA-HEX 4% CHG SCRUB 4OZ BOTTLE MDS098710

## (undated) DEVICE — ANTIFOG SOLUTION W/FOAM PAD 31142527

## (undated) DEVICE — PITCHER STERILE 1000ML  SSK9004A

## (undated) DEVICE — WAND COBLATION TURBINATOR RDC ARIS 72290113

## (undated) DEVICE — ENDO VALVE SUCTION ULTRASOUND BRONCH MAJ-1414

## (undated) DEVICE — ENDO VALVE BX EVIS MAJ-210

## (undated) DEVICE — TUBING SUCTION 10'X3/16" N510

## (undated) DEVICE — SOL NACL 0.9% IRRIG 1000ML BOTTLE 2F7124

## (undated) DEVICE — SLEEVE TR BAND RADIAL COMPRESSION DEVICE 29CM XX-RF06L

## (undated) DEVICE — NDL 27GA 1 1/2" 1188827112

## (undated) DEVICE — ENDO ADPT BRONCH SWIVEL Y A1002

## (undated) DEVICE — SPECIMEN TRAP MUCOUS 40ML LUKI C30200A

## (undated) DEVICE — NDL BLUNT 18GA 1" W/O FILTER 305181

## (undated) DEVICE — ENDO FORCEP ALLIGATOR JAW BIOPSY 2MMX100CM FB-211D

## (undated) DEVICE — LINEN TOWEL PACK X5 5464

## (undated) DEVICE — DRAPE SLEEVE 599

## (undated) DEVICE — PREP POVIDONE-IODINE 10% SOLUTION 4OZ BOTTLE MDS093944

## (undated) DEVICE — SYR 03ML LL W/O NDL 309657

## (undated) DEVICE — SPONGE NEURO 1/2X3 WECK 200104

## (undated) DEVICE — KIT HAND CONTROL ACIST 016795

## (undated) DEVICE — PACK HEART LEFT CUSTOM

## (undated) DEVICE — ENDO VALVE SUCTION BRONCH EVIS MAJ-209

## (undated) DEVICE — SYR 10ML SLIP TIP W/O NDL 303134

## (undated) DEVICE — MANIFOLD KIT ANGIO AUTOMATED 014613

## (undated) DEVICE — SYR 50ML LL W/O NDL 309653

## (undated) DEVICE — TUBING PRESSURE 30"

## (undated) DEVICE — SINUS BALL INFLATION DEVICE BID30

## (undated) DEVICE — TRACKER PATIENT NON-INVASIVE AXIEM 9734887

## (undated) DEVICE — KIT ENDO FIRST STEP DISINFECTANT 200ML W/POUCH EP-4

## (undated) DEVICE — SHTH INTRO 0.021IN ID 6FR DIA

## (undated) DEVICE — NDS RELIEVA TRACT 16MM X 40MM RT1640A

## (undated) DEVICE — SINUS CATH BALLOON RELIEVA SPINPLUS FRONTAL RSP0616MFS

## (undated) DEVICE — TUBING SUCTION MEDI-VAC 1/4"X20' N620A

## (undated) DEVICE — SOL WATER IRRIG 1000ML BOTTLE 2F7114

## (undated) DEVICE — Device

## (undated) DEVICE — DRAPE U SPLIT 74X120" 29440

## (undated) DEVICE — KIT INFLATOR BALLOON 18INFKIT

## (undated) DEVICE — CUSTOM PACK MINOR SBA5BMNHEA

## (undated) DEVICE — SOL NACL 0.9% INJ 500ML BAG 2B1323Q

## (undated) DEVICE — LUBRICANT INST KIT ENDO-LUBE 220-90

## (undated) DEVICE — TRAY PREP DRY SKIN SCRUB 067

## (undated) DEVICE — CATH JACKY 5FR 3.5 CURVE 40-5023

## (undated) DEVICE — ESU GROUND PAD ADULT REM W/15' CORD E7507DB

## (undated) DEVICE — TRACKER PATIENT NON-INVASIVE AXIEM 9734887XOM

## (undated) DEVICE — LABEL MEDICATION SYSTEM 3303-P

## (undated) RX ORDER — EPINEPHRINE 1 MG/ML
INJECTION, SOLUTION INTRAMUSCULAR; SUBCUTANEOUS
Status: DISPENSED
Start: 2024-09-03

## (undated) RX ORDER — ONDANSETRON 2 MG/ML
INJECTION INTRAMUSCULAR; INTRAVENOUS
Status: DISPENSED
Start: 2025-04-03

## (undated) RX ORDER — FENTANYL CITRATE 50 UG/ML
INJECTION, SOLUTION INTRAMUSCULAR; INTRAVENOUS
Status: DISPENSED
Start: 2023-07-28

## (undated) RX ORDER — ACETAMINOPHEN 325 MG/1
TABLET ORAL
Status: DISPENSED
Start: 2025-05-19

## (undated) RX ORDER — FENTANYL CITRATE 50 UG/ML
INJECTION, SOLUTION INTRAMUSCULAR; INTRAVENOUS
Status: DISPENSED
Start: 2023-01-26

## (undated) RX ORDER — FENTANYL CITRATE 50 UG/ML
INJECTION, SOLUTION INTRAMUSCULAR; INTRAVENOUS
Status: DISPENSED
Start: 2025-04-03

## (undated) RX ORDER — NICARDIPINE HCL-0.9% SOD CHLOR 1 MG/10 ML
SYRINGE (ML) INTRAVENOUS
Status: DISPENSED
Start: 2023-07-28

## (undated) RX ORDER — LIDOCAINE HYDROCHLORIDE 10 MG/ML
INJECTION, SOLUTION EPIDURAL; INFILTRATION; INTRACAUDAL; PERINEURAL
Status: DISPENSED
Start: 2025-04-03

## (undated) RX ORDER — PROPOFOL 10 MG/ML
INJECTION, EMULSION INTRAVENOUS
Status: DISPENSED
Start: 2023-01-26

## (undated) RX ORDER — PROPOFOL 10 MG/ML
INJECTION, EMULSION INTRAVENOUS
Status: DISPENSED
Start: 2025-05-19

## (undated) RX ORDER — HEPARIN SODIUM 1000 [USP'U]/ML
INJECTION, SOLUTION INTRAVENOUS; SUBCUTANEOUS
Status: DISPENSED
Start: 2023-07-28

## (undated) RX ORDER — ACETAMINOPHEN 325 MG/1
TABLET ORAL
Status: DISPENSED
Start: 2023-01-26

## (undated) RX ORDER — LIDOCAINE HYDROCHLORIDE 10 MG/ML
INJECTION, SOLUTION EPIDURAL; INFILTRATION; INTRACAUDAL; PERINEURAL
Status: DISPENSED
Start: 2025-06-30

## (undated) RX ORDER — FENTANYL CITRATE-0.9 % NACL/PF 10 MCG/ML
PLASTIC BAG, INJECTION (ML) INTRAVENOUS
Status: DISPENSED
Start: 2025-05-19

## (undated) RX ORDER — DEXAMETHASONE SODIUM PHOSPHATE 4 MG/ML
INJECTION, SOLUTION INTRA-ARTICULAR; INTRALESIONAL; INTRAMUSCULAR; INTRAVENOUS; SOFT TISSUE
Status: DISPENSED
Start: 2025-04-03

## (undated) RX ORDER — SODIUM CHLORIDE, SODIUM LACTATE, POTASSIUM CHLORIDE, CALCIUM CHLORIDE 600; 310; 30; 20 MG/100ML; MG/100ML; MG/100ML; MG/100ML
INJECTION, SOLUTION INTRAVENOUS
Status: DISPENSED
Start: 2025-05-19

## (undated) RX ORDER — FENTANYL CITRATE-0.9 % NACL/PF 10 MCG/ML
PLASTIC BAG, INJECTION (ML) INTRAVENOUS
Status: DISPENSED
Start: 2025-04-03

## (undated) RX ORDER — SODIUM CHLORIDE 9 MG/ML
INJECTION, SOLUTION INTRAVENOUS
Status: DISPENSED
Start: 2023-07-28

## (undated) RX ORDER — FENTANYL CITRATE 50 UG/ML
INJECTION, SOLUTION INTRAMUSCULAR; INTRAVENOUS
Status: DISPENSED
Start: 2025-06-30

## (undated) RX ORDER — FENTANYL CITRATE 50 UG/ML
INJECTION, SOLUTION INTRAMUSCULAR; INTRAVENOUS
Status: DISPENSED
Start: 2025-05-19

## (undated) RX ORDER — PROPOFOL 10 MG/ML
INJECTION, EMULSION INTRAVENOUS
Status: DISPENSED
Start: 2025-06-30

## (undated) RX ORDER — DIPHENHYDRAMINE HYDROCHLORIDE 50 MG/ML
INJECTION INTRAMUSCULAR; INTRAVENOUS
Status: DISPENSED
Start: 2023-07-28

## (undated) RX ORDER — PROPOFOL 10 MG/ML
INJECTION, EMULSION INTRAVENOUS
Status: DISPENSED
Start: 2025-04-03

## (undated) RX ORDER — EPHEDRINE SULFATE 50 MG/ML
INJECTION, SOLUTION INTRAMUSCULAR; INTRAVENOUS; SUBCUTANEOUS
Status: DISPENSED
Start: 2025-06-30

## (undated) RX ORDER — LIDOCAINE HYDROCHLORIDE 10 MG/ML
INJECTION, SOLUTION EPIDURAL; INFILTRATION; INTRACAUDAL; PERINEURAL
Status: DISPENSED
Start: 2023-07-28

## (undated) RX ORDER — FENTANYL CITRATE 50 UG/ML
INJECTION, SOLUTION INTRAMUSCULAR; INTRAVENOUS
Status: DISPENSED
Start: 2024-09-03

## (undated) RX ORDER — OXYMETAZOLINE HYDROCHLORIDE 0.05 G/100ML
SPRAY NASAL
Status: DISPENSED
Start: 2024-09-03

## (undated) RX ORDER — LIDOCAINE HYDROCHLORIDE AND EPINEPHRINE 10; 10 MG/ML; UG/ML
INJECTION, SOLUTION INFILTRATION; PERINEURAL
Status: DISPENSED
Start: 2024-09-03

## (undated) RX ORDER — KETAMINE HYDROCHLORIDE 10 MG/ML
INJECTION INTRAMUSCULAR; INTRAVENOUS
Status: DISPENSED
Start: 2025-06-30

## (undated) RX ORDER — DEXAMETHASONE SODIUM PHOSPHATE 4 MG/ML
INJECTION, SOLUTION INTRA-ARTICULAR; INTRALESIONAL; INTRAMUSCULAR; INTRAVENOUS; SOFT TISSUE
Status: DISPENSED
Start: 2025-05-19

## (undated) RX ORDER — ONDANSETRON 2 MG/ML
INJECTION INTRAMUSCULAR; INTRAVENOUS
Status: DISPENSED
Start: 2025-05-19

## (undated) RX ORDER — NITROGLYCERIN 5 MG/ML
VIAL (ML) INTRAVENOUS
Status: DISPENSED
Start: 2023-07-28

## (undated) RX ORDER — FENTANYL CITRATE 50 UG/ML
INJECTION, SOLUTION INTRAMUSCULAR; INTRAVENOUS
Status: DISPENSED
Start: 2024-07-31